# Patient Record
Sex: FEMALE | Race: WHITE
[De-identification: names, ages, dates, MRNs, and addresses within clinical notes are randomized per-mention and may not be internally consistent; named-entity substitution may affect disease eponyms.]

---

## 2019-11-28 ENCOUNTER — HOSPITAL ENCOUNTER (EMERGENCY)
Dept: HOSPITAL 97 - ER | Age: 69
Discharge: ANOTHER HOSPITAL | End: 2019-11-28
Payer: COMMERCIAL

## 2019-11-28 DIAGNOSIS — R47.01: ICD-10-CM

## 2019-11-28 DIAGNOSIS — Z79.82: ICD-10-CM

## 2019-11-28 DIAGNOSIS — R29.702: ICD-10-CM

## 2019-11-28 DIAGNOSIS — E11.9: ICD-10-CM

## 2019-11-28 DIAGNOSIS — I10: ICD-10-CM

## 2019-11-28 DIAGNOSIS — Z88.5: ICD-10-CM

## 2019-11-28 DIAGNOSIS — I63.9: Primary | ICD-10-CM

## 2019-11-28 LAB
ALBUMIN SERPL BCP-MCNC: 3.1 G/DL (ref 3.4–5)
ALP SERPL-CCNC: 118 U/L (ref 45–117)
ALT SERPL W P-5'-P-CCNC: 23 U/L (ref 12–78)
AST SERPL W P-5'-P-CCNC: 14 U/L (ref 15–37)
BUN BLD-MCNC: 11 MG/DL (ref 7–18)
GLUCOSE SERPLBLD-MCNC: 356 MG/DL (ref 74–106)
HCT VFR BLD CALC: 46.9 % (ref 36–45)
INR BLD: 1.05
LYMPHOCYTES # SPEC AUTO: 1.7 K/UL (ref 0.7–4.9)
MAGNESIUM SERPL-MCNC: 2.1 MG/DL (ref 1.8–2.4)
NT-PROBNP SERPL-MCNC: 701 PG/ML (ref ?–125)
PMV BLD: 11.4 FL (ref 7.6–11.3)
POTASSIUM SERPL-SCNC: 4 MMOL/L (ref 3.5–5.1)
RBC # BLD: 5.12 M/UL (ref 3.86–4.86)
TROPONIN (EMERG DEPT USE ONLY): < 0.02 NG/ML (ref 0–0.04)

## 2019-11-28 PROCEDURE — 84484 ASSAY OF TROPONIN QUANT: CPT

## 2019-11-28 PROCEDURE — 85730 THROMBOPLASTIN TIME PARTIAL: CPT

## 2019-11-28 PROCEDURE — 71045 X-RAY EXAM CHEST 1 VIEW: CPT

## 2019-11-28 PROCEDURE — 96372 THER/PROPH/DIAG INJ SC/IM: CPT

## 2019-11-28 PROCEDURE — 70496 CT ANGIOGRAPHY HEAD: CPT

## 2019-11-28 PROCEDURE — 70450 CT HEAD/BRAIN W/O DYE: CPT

## 2019-11-28 PROCEDURE — 83735 ASSAY OF MAGNESIUM: CPT

## 2019-11-28 PROCEDURE — 85610 PROTHROMBIN TIME: CPT

## 2019-11-28 PROCEDURE — 96366 THER/PROPH/DIAG IV INF ADDON: CPT

## 2019-11-28 PROCEDURE — 80076 HEPATIC FUNCTION PANEL: CPT

## 2019-11-28 PROCEDURE — 83880 ASSAY OF NATRIURETIC PEPTIDE: CPT

## 2019-11-28 PROCEDURE — 36415 COLL VENOUS BLD VENIPUNCTURE: CPT

## 2019-11-28 PROCEDURE — 96365 THER/PROPH/DIAG IV INF INIT: CPT

## 2019-11-28 PROCEDURE — 93005 ELECTROCARDIOGRAM TRACING: CPT

## 2019-11-28 PROCEDURE — 99285 EMERGENCY DEPT VISIT HI MDM: CPT

## 2019-11-28 PROCEDURE — 85025 COMPLETE CBC W/AUTO DIFF WBC: CPT

## 2019-11-28 PROCEDURE — 82947 ASSAY GLUCOSE BLOOD QUANT: CPT

## 2019-11-28 PROCEDURE — 96375 TX/PRO/DX INJ NEW DRUG ADDON: CPT

## 2019-11-28 PROCEDURE — 80048 BASIC METABOLIC PNL TOTAL CA: CPT

## 2019-11-28 NOTE — EDPHYS
Physician Documentation                                                                           

 [facility]                                                                 

Name: Sue Desai                                                                             

Age: 69 yrs                                                                                       

Sex: Female                                                                                       

: 1950                                                                                   

MRN: [id number]                                                                                   

Arrival Date: 2019                                                                          

Time: 18:40                                                                                       

Account#: [id number]                                                                            

Bed 24                                                                                            

Private MD:                                                                                       

ED Physician Pepe Guzmán                                                                      

HPI:                                                                                              

                                                                                             

18:55 This 69 yrs old  Female presents to ER via Ambulatory with complaints of       laquita 

      Trouble Talking.                                                                            

18:55 The patient presents to the emergency department with a speech or higher order brain    laquita 

      function problem, aphasia. Onset: The symptoms/episode began/occurred 4 day(s) ago.         

      Context: occurred at an unknown location. Associated signs and symptoms: The patient        

      has no apparent associated signs or symptoms. Severity of symptoms: At their worst the      

      symptoms were moderate in the emergency department the symptoms are unchanged.              

      Patient's baseline: Neuro: alert and fully oriented. The patient has not experienced        

      similar symptoms in the past.                                                               

                                                                                                  

Historical:                                                                                       

- Allergies:                                                                                      

18:45 Codeine;                                                                                tw2 

- Home Meds:                                                                                      

18:45 [place] Thyroid 60 mg Oral tab daily [Active];                                           tw2 

18:58 furosemide 40 mg Oral tab 1 tab 2 times per day [Active]; clonidine HCl 0.2 mg Oral tab tw2 

      1 tab 3 times per day [Active]; Prevacid 15 mg Oral cpDR 1 cap once daily [Active];         

      aspirin 81 mg Oral chew 1 tab once daily [Active]; glimepiride 4 mg Oral tab 1 tab once     

      daily [Active]; gabapentin 300 mg oral cap 1 cap 3 times per day [Active]; lisinopril       

      40 mg Oral tab 1 tab once daily [Active]; atenolol 100 mg Oral tab 1 tab once daily         

      [Active]; potassium chloride 10 mEq Oral cpER 1 cap once daily [Active]; diltiazem HCl      

      120 mg Oral CDER 1 cap once daily [Active];                                                 

- PMHx:                                                                                           

18:45 Back pain; Diabetes - NIDDM; Hypertension;                                              tw2 

                                                                                                  

- Immunization history:: Adult Immunizations.                                                     

- Social history:: Smoking status: .                                                              

- Ebola Screening: : Patient denies travel to an Ebola-affected area in the 21 days               

  before illness onset.                                                                           

- Family history:: not pertinent.                                                                 

                                                                                                  

                                                                                                  

ROS:                                                                                              

18:55 Constitutional: Negative for fever, chills, and weight loss, Eyes: Negative for injury, laquita 

      pain, redness, and discharge, ENT: Negative for injury, pain, and discharge, Neck:          

      Negative for injury, pain, and swelling, Cardiovascular: Negative for chest pain,           

      palpitations, and edema, Respiratory: Negative for shortness of breath, cough,              

      wheezing, and pleuritic chest pain, Abdomen/GI: Negative for abdominal pain, nausea,        

      vomiting, diarrhea, and constipation, Back: Negative for injury and pain, : Negative      

      for injury, bleeding, discharge, and swelling, MS/Extremity: Negative for injury and        

      deformity, Skin: Negative for injury, rash, and discoloration, Psych: Negative for          

      depression, anxiety, suicide ideation, homicidal ideation, and hallucinations,              

      Allergy/Immunology: Negative for hives, rash, and allergies, Endocrine: Negative for        

      neck swelling, polydipsia, polyuria, polyphagia, and marked weight changes,                 

      Hematologic/Lymphatic: Negative for swollen nodes, abnormal bleeding, and unusual           

      bruising.                                                                                   

18:55 Neuro: Positive for speech changes.                                                         

                                                                                                  

Exam:                                                                                             

18:55 Constitutional:  This is a well developed, well nourished patient who is awake, alert,  laquita 

      and in no acute distress. Head/Face:  Normocephalic, atraumatic. Eyes:  Pupils equal        

      round and reactive to light, extra-ocular motions intact.  Lids and lashes normal.          

      Conjunctiva and sclera are non-icteric and not injected.  Cornea within normal limits.      

      Periorbital areas with no swelling, redness, or edema. ENT:  Nares patent. No nasal         

      discharge, no septal abnormalities noted.  Tympanic membranes are normal and external       

      auditory canals are clear.  Oropharynx with no redness, swelling, or masses, exudates,      

      or evidence of obstruction, uvula midline.  Mucous membranes moist. Neck:  Trachea          

      midline, no thyromegaly or masses palpated, and no cervical lymphadenopathy.  Supple,       

      full range of motion without nuchal rigidity, or vertebral point tenderness.  No            

      Meningismus. Chest/axilla:  Normal chest wall appearance and motion.  Nontender with no     

      deformity.  No lesions are appreciated. Cardiovascular:  Regular rate and rhythm with a     

      normal S1 and S2.  No gallops, murmurs, or rubs.  Normal PMI, no JVD.  No pulse             

      deficits. Respiratory:  Lungs have equal breath sounds bilaterally, clear to                

      auscultation and percussion.  No rales, rhonchi or wheezes noted.  No increased work of     

      breathing, no retractions or nasal flaring. Abdomen/GI:  Soft, non-tender, with normal      

      bowel sounds.  No distension or tympany.  No guarding or rebound.  No evidence of           

      tenderness throughout. Back:  No spinal tenderness.  No costovertebral tenderness.          

      Full range of motion. Female :  Normal external genitalia. Skin:  Warm, dry with          

      normal turgor.  Normal color with no rashes, no lesions, and no evidence of cellulitis.     

      MS/ Extremity:  Pulses equal, no cyanosis.  Neurovascular intact.  Full, normal range       

      of motion. Psych:  Awake, alert, with orientation to person, place and time.  Behavior,     

      mood, and affect are within normal limits.                                                  

18:55 Neuro: Orientation: is normal, appropriate for stated age, no acute changes, Mentation:     

      is normal, appropriate for stated age, no acute changes, Memory: is normal, appropriate     

      for stated age, no acute changes, Cranial nerves: grossly normal, is grossly normal         

      based on the patient's age, no acute changes, Cerebellar function: is grossly normal,       

      is grossly normal based on the patient's age, Motor: is normal, is grossly normal based     

      on the patient's age, no acute changes, moves all fours, Sensation: is normal, Gait:        

      not applicable seizure activity, is not displayed by the patient.                           

                                                                                                  

Vital Signs:                                                                                      

18:43  / 83; Pulse 68; Resp 17; Temp 98.1(O); Pulse Ox 100% on R/A; Weight 77.11 kg;    tw2 

      Pain 0/10;                                                                                  

19:30  / 81; Pulse 68; Resp 18; Pulse Ox 100% on R/A;                                   mg2 

20:47  / 61; Pulse 67; Resp 18; Pulse Ox 100% on R/A;                                   mg2 

21:46  / 65; Pulse 63; Resp 18; Temp 98(O); Pulse Ox 99% on R/A; Pain 0/10;             mg2 

                                                                                                  

NIH Stroke Scale Scores:                                                                          

19:07 NIHSS Score: 2                                                                          laquita 

19:15 NIHSS Score: 3                                                                          mg2 

19:43 NIHSS Score: 3                                                                          laquita 

                                                                                                  

MDM:                                                                                              

18:53 Patient medically screened.                                                             laquita 

18:58 Data reviewed: vital signs, nurses notes, lab test result(s), EKG, radiologic studies,  laquita 

      CT scan, strok symptoms begn , worse today.                                           

19:44 ED course: neuro md at [facility], no tpa,window greater than 2 days.                          [facility] 

                                                                                                  

                                                                                             

18:52 Order name: Basic Metabolic Panel                                                       [facility] 

                                                                                             

18:52 Order name: CBC with Diff                                                               [facility] 

                                                                                             

18:52 Order name: LFT's                                                                       [facility] 

                                                                                             

18:52 Order name: Magnesium                                                                   [facility] 

                                                                                             

18:52 Order name: NT PRO-BNP                                                                  [facility] 

                                                                                             

18:52 Order name: PT-INR                                                                      [facility] 

                                                                                             

18:52 Order name: Troponin (emerg Dept Use Only)                                              [facility] 

                                                                                             

18:52 Order name: Ptt, Activated                                                              [facility] 

                                                                                             

19:12 Order name: CBC with Automated Diff; Complete Time: 19:16                               [facility]

                                                                                             

19:16 Order name: Glucose, Ancillary Testing; Complete Time: 19:16                            [facility]

                                                                                             

19:22 Order name: Protime (+INR); Complete Time: 19:23                                        [facility]

                                                                                             

19:22 Order name: PTT, Activated Partial Thromb; Complete Time: 19:23                         [facility]

                                                                                             

19:30 Order name: Basic Metabolic Panel; Complete Time: 19:34                                 [facility]

                                                                                             

19:30 Order name: Liver (Hepatic) Function; Complete Time: 19:34                              [facility]

                                                                                             

18:52 Order name: XRAY Chest (1 view)                                                         [facility] 

                                                                                             

18:52 Order name: CT Stroke Brain w/o Contrast                                                [facility] 

                                                                                             

19:19 Order name: CT; Complete Time: 19:21                                                    [facility]

                                                                                             

19:30 Order name: Troponin (Emerg Dept Use Only); Complete Time: 19:34                        [facility]

                                                                                             

19:30 Order name: NT PRO-BNP; Complete Time: 19:34                                            [facility]

                                                                                             

19:30 Order name: Magnesium; Complete Time: 19:34                                             [facility]

                                                                                             

19:36 Order name: CT Head Angio                                                               [facility] 

                                                                                             

19:43 Order name: RAD; Complete Time: 19:57                                                   [facility]

                                                                                             

20:06 Order name: Urine Culture                                                               [facility] 

                                                                                             

20:42 Order name: CT                                                                          [facility]

                                                                                             

21:01 Order name: Glucose, Ancillary Testing                                                  [facility]

                                                                                             

18:52 Order name: EKG; Complete Time: 18:54                                                   [facility] 

                                                                                             

18:52 Order name: Cardiac monitoring; Complete Time: 19:05                                    [facility] 

                                                                                             

18:52 Order name: EKG - Nurse/Tech; Complete Time: 19:04                                      [facility] 

                                                                                             

18:52 Order name: IV Saline Lock; Complete Time: 19:05                                        [facility] 

                                                                                             

18:52 Order name: Labs collected and sent; Complete Time: 19:05                               [facility] 

                                                                                             

18:52 Order name: O2 Per Protocol; Complete Time: 19:05                                       [facility] 

                                                                                             

18:52 Order name: O2 Sat Monitoring; Complete Time: 19:05                                     [facility] 

                                                                                             

18:52 Order name: Accucheck; Complete Time: 19:04                                             [facility] 

                                                                                             

18:52 Order name: NPO; Complete Time: 19:04                                                   [facility] 

                                                                                             

18:52 Order name: Stroke Swallow Screen; Complete Time: 19:15                                 [facility] 

                                                                                                  

Administered Medications:                                                                         

19:14 Drug: NS 0.9% 1000 ml Route: IV; Rate: 1 bolus; Site: right antecubital;                tr5 

21:11 Follow up: Response: No adverse reaction; IV Status: Completed infusion; IV Intake:     mg2 

      1000ml                                                                                      

19:15 Drug: foLIC Acid 1 mg Route: IVPB; Site: right antecubital;                             tr5 

21:12 Follow up: Response: No adverse reaction; IV Status: Completed infusion                 mg2 

19:27 Drug: Aspirin Chewable Tablet 162 mg Route: PO;                                         tr5 

21:11 Follow up: Response: No adverse reaction                                                mg2 

19:27 Drug: PlaVIX 75 mg Route: PO;                                                           tr5 

21:11 Follow up: Response: No adverse reaction                                                mg2 

19:39 Drug: Insulin Regular Human 6 units {Co-Signature: mg2 (Juan Meraz RN).} Route:    tr5 

      Sub-Q; Site: right upper arm;                                                               

21:09 Follow up: Response: No adverse reaction; Blood sugar is lowered                        mg2 

19:40 Drug: Insulin Regular Human 5 units {Co-Signature: mg2 (Juan Meraz RN).} Route:    tr5 

      IVP; Site: right antecubital;                                                               

21:10 Follow up: Response: No adverse reaction; Blood sugar is lowered                        mg2 

21:10 Drug: NS 0.9% 1000 ml Route: IV; Rate: 125 ml/hr; Site: right forearm;                  mg2 

21:45 Drug: Rocephin 1 grams Route: IV; Rate: per protocol; Site: right forearm;              mg2 

                                                                                                  

                                                                                                  

Disposition:                                                                                      

19 19:01 Transfer ordered to [facility]. Diagnosis are Cerebral         

  infarction, Aphasia following cerebral infarction, Type 2 diabetes mellitus,                    

  Essential (primary) hypertension.                                                               

- Reason for transfer: Higher level of care.                                                      

- Accepting physician is to [facility], stroke team.                                                     

- Condition is Stable.                                                                            

- Problem is new.                                                                                 

- Symptoms have improved.                                                                         

                                                                                                  

                                                                                                  

                                                                                                  

                                                                                                  

NIH Stroke Scale - NIH Stroke Score                                                               

Date: 2019                                                                                  

Time: 19:07                                                                                       

Total Score = 2                                                                                   

  1a. Level of Consciousness (LOC) - 0(Alert)                                                     

  1b. Level of Consciousness (LOC) (Year \T\ Age) - 0(Both)                                       

  1c. LOC Commands (Open \T\ Closes Eyes/) - 0(Both)                                          

   2. Best Gaze (Lateral Gaze Paresis) - 0(Normal)                                                

   3. Visual Field Loss - 0(No visual loss)                                                       

   4. Facial Palsy - 0(Normal)                                                                    

  5a. Left Arm: Motor (10-second hold) - 0(No drift)                                              

  5b. Right Arm: Motor (10-second hold) - 0(No drift)                                             

  6a. Left Leg: Motor (5-second hold - always test supine) - 0(No drift)                          

  6b. Right Leg: Motor (5-second hold - always test supine) - 0(No drift)                         

   7. Limb Ataxia (finger/nose \T\ heel/shin - test with eyes open) - 0(Absent)                   

   8. Sensory Loss (pinprick arms/legs/face) - 0(Normal)                                          

   9. Best Language: Aphasia (description/naming/reading) - 1(Mild to moderate                    

      aphasia)                                                                                    

  10. Dysarthria (speech clarity - read or repeat words) - 1(Mild to Moderate)                    

  11. Extinction and Inattention (visual/tactile/auditory/spatial/personal) - 0(No                

      abnormality)                                                                                

Initials: [facility]                                                                                     

                                                                                                  

                                                                                                  

NIH Stroke Scale - NIH Stroke Score                                                               

Date: 2019                                                                                  

Time: 19:15                                                                                       

Total Score = 3                                                                                   

  1a. Level of Consciousness (LOC) - 0(Alert)                                                     

  1b. Level of Consciousness (LOC) (Year \T\ Age) - 0(Both)                                       

  1c. LOC Commands (Open \T\ Closes Eyes/) - 0(Both)                                          

   2. Best Gaze (Lateral Gaze Paresis) - 0(Normal)                                                

   3. Visual Field Loss - 0(No visual loss)                                                       

   4. Facial Palsy - 1(Minor Paralysis)                                                           

  5a. Left Arm: Motor (10-second hold) - 0(No drift)                                              

  5b. Right Arm: Motor (10-second hold) - 0(No drift)                                             

  6a. Left Leg: Motor (5-second hold - always test supine) - 0(No drift)                          

  6b. Right Leg: Motor (5-second hold - always test supine) - 0(No drift)                         

   7. Limb Ataxia (finger/nose \T\ heel/shin - test with eyes open) - 0(Absent)                   

   8. Sensory Loss (pinprick arms/legs/face) - 0(Normal)                                          

   9. Best Language: Aphasia (description/naming/reading) - 1(Mild to moderate                    

      aphasia)                                                                                    

  10. Dysarthria (speech clarity - read or repeat words) - 1(Mild to Moderate)                    

  11. Extinction and Inattention (visual/tactile/auditory/spatial/personal) - 0(No                

      abnormality)                                                                                

Initials: mg2                                                                                     

                                                                                                  

                                                                                                  

NIH Stroke Scale - NIH Stroke Score                                                               

Date: 2019                                                                                  

Time: 19:43                                                                                       

Total Score = 3                                                                                   

  1a. Level of Consciousness (LOC) - 0(Alert)                                                     

  1b. Level of Consciousness (LOC) (Year \T\ Age) - 0(Both)                                       

  1c. LOC Commands (Open \T\ Closes Eyes/) - 0(Both)                                          

   2. Best Gaze (Lateral Gaze Paresis) - 0(Normal)                                                

   3. Visual Field Loss - 0(No visual loss)                                                       

   4. Facial Palsy - 1(Minor Paralysis)                                                           

  5a. Left Arm: Motor (10-second hold) - 0(No drift)                                              

  5b. Right Arm: Motor (10-second hold) - 0(No drift)                                             

  6a. Left Leg: Motor (5-second hold - always test supine) - 0(No drift)                          

  6b. Right Leg: Motor (5-second hold - always test supine) - 0(No drift)                         

   7. Limb Ataxia (finger/nose \T\ heel/shin - test with eyes open) - 0(Absent)                   

   8. Sensory Loss (pinprick arms/legs/face) - 0(Normal)                                          

   9. Best Language: Aphasia (description/naming/reading) - 1(Mild to moderate                    

      aphasia)                                                                                    

  10. Dysarthria (speech clarity - read or repeat words) - 1(Mild to Moderate)                    

  11. Extinction and Inattention (visual/tactile/auditory/spatial/personal) - 0(No                

      abnormality)                                                                                

Initials: [facility]                                                                                     

                                                                                                  

Signatures:                                                                                       

Dispatcher [employer]                           EDPepe Nobles MD MD cha Wise, Tara RN                          RN   tw2                                                  

Juan Meraz RN RN   mg2                                                  

Ilir Gonzalez RN                   RN   tr5                                                  

Juan Meraz RN                           mg2                                                  

                                                                                                  

Corrections: (The following items were deleted from the chart)                                    

19:18 19:01 2019 19:01 Transfer ordered to [facility].      laquita         

      Diagnosis is Cerebral infarction; Aphasia following cerebral infarction. Reason             

      for transfer: Higher level of care. Accepting physician is to [facility], stroke team.             

      Condition is Stable. Problem is new. Symptoms have improved. laquita                            

19:18 19:18 2019 19:01 Transfer ordered to [facility].      laquita         

      Diagnosis is Cerebral infarction; Aphasia following cerebral infarction; Type 2             

      diabetes mellitus. Reason for transfer: Higher level of care. Accepting                     

      physician is to [facility], stroke team. Condition is Stable. Problem is new. Symptoms             

      have improved. laquita                                                                          

22:14 19:18 2019 19:01 Transfer ordered to [facility].      tr5         

      Diagnosis is Cerebral infarction; Aphasia following cerebral infarction; Type 2             

      diabetes mellitus; Essential (primary) hypertension. Reason for transfer:                   

      Higher level of care. Accepting physician is to [facility], stroke team. Condition is              

      Stable. Problem is new. Symptoms have improved. laquita                                         

                                                                                                  

**************************************************************************************************

## 2019-11-28 NOTE — ER
Nurse's Notes                                                                                     

 [facility]                                                                 

Name: Sue Desai                                                                             

Age: 69 yrs                                                                                       

Sex: Female                                                                                       

: 1950                                                                                   

MRN: [id number]                                                                                   

Arrival Date: 2019                                                                          

Time: 18:40                                                                                       

Account#: [id number]                                                                            

Bed 24                                                                                            

Private MD:                                                                                       

Diagnosis: Cerebral infarction;Aphasia following cerebral infarction;Type 2 diabetes              

  mellitus;Essential (primary) hypertension                                                       

                                                                                                  

Presentation:                                                                                     

                                                                                             

18:41 Presenting complaint: Patient states: i cant remember Child states: on Tuesday evening  tw2 

      when she got off work at 10pm she seemed off, today she called me about 1pm and she was     

      saying that she wasn't going to work, my daughter said she was down yesterday not           

      perky, and then today at 5pm i went by there and she cant get her thought out, left         

      cerebellar stroke 3 years ago. Transition of care: patient was not received from            

      another setting of care. Onset of symptoms was 2019. Risk Assessment: Do       

      you want to hurt yourself or someone else? Patient reports no desire to harm self or        

      others. Initial Sepsis Screen: Does the patient meet any 2 criteria? No. Patient's          

      initial sepsis screen is negative. Does the patient have a suspected source of              

      infection? No. Patient's initial sepsis screen is negative. Care prior to arrival: None.    

18:41 Method Of Arrival: Ambulatory                                                           tw2 

18:41 Acuity: GERI 2                                                                           tw2 

18:53 Presenting complaint: Child states: to me her face looks different like her right side  tw2 

      is swollen, i am going to call my daughter and see if she can give me more specifics on     

      how she was today and when the difficult getting her words out started. and again her       

      stroke 3 years ago did not present as your typical stroke.                                  

                                                                                                  

Triage Assessment:                                                                                

18:44 General: Appears in no apparent distress. well groomed, Behavior is cooperative. Pain:  tw2 

      Denies pain. Neuro: Reports "trouble getting my words out".                                 

                                                                                                  

Historical:                                                                                       

- Allergies:                                                                                      

18:45 Codeine;                                                                                tw2 

- Home Meds:                                                                                      

18:45 Persia Thyroid 60 mg Oral tab daily [Active];                                           tw2 

18:58 furosemide 40 mg Oral tab 1 tab 2 times per day [Active]; clonidine HCl 0.2 mg Oral tab tw2 

      1 tab 3 times per day [Active]; Prevacid 15 mg Oral cpDR 1 cap once daily [Active];         

      aspirin 81 mg Oral chew 1 tab once daily [Active]; glimepiride 4 mg Oral tab 1 tab once     

      daily [Active]; gabapentin 300 mg oral cap 1 cap 3 times per day [Active]; lisinopril       

      40 mg Oral tab 1 tab once daily [Active]; atenolol 100 mg Oral tab 1 tab once daily         

      [Active]; potassium chloride 10 mEq Oral cpER 1 cap once daily [Active]; diltiazem HCl      

      120 mg Oral CDER 1 cap once daily [Active];                                                 

- PMHx:                                                                                           

18:45 Back pain; Diabetes - NIDDM; Hypertension;                                              tw2 

                                                                                                  

- Immunization history:: Adult Immunizations.                                                     

- Social history:: Smoking status: .                                                              

- Ebola Screening: : Patient denies travel to an Ebola-affected area in the 21 days               

  before illness onset.                                                                           

- Family history:: not pertinent.                                                                 

                                                                                                  

                                                                                                  

Screenin:55 Abuse screen: Denies threats or abuse. Nutritional screening: No deficits noted.        tw2 

      Tuberculosis screening: No symptoms or risk factors identified. Fall Risk Secondary         

      diagnosis (15 points) impaired mobility.                                                    

19:25 Patient has been NPO before screening. The patient is alert, able to follow commands.   mg2 

      The patient exhibits slurred or garbled speech. The patient is exhibiting difficulty        

      speaking. The patient does not exhibit difficulty understanding words. The patient is       

      able to swallow own secretions with no drooling or need for suction. Patient tolerated      

      one teaspoon of water. No drooling, immediate coughing, gurgling, or clearing of the        

      throat was noted. The patient tolerated 90mL of water. No drooling, immediate coughing,     

      gurgling, or clearing of the throat was noted. The patient passed the bedside swallow       

      screening. Oral medications may be given as ordered. Contact Physician for further diet     

      orders. Provider notified of bedside swallow screening results: Pepe Guzmán MD.          

                                                                                                  

Assessment:                                                                                       

18:49 Reassessment: pt transported to CT via stretcher with JENIFFER Phillips at this time.         tw2 

19:42 General: Appears in no apparent distress. comfortable, Behavior is calm, cooperative.   mg2 

      Pain: Denies pain. Neuro: Level of Consciousness is awake, alert, obeys commands,           

      Oriented to person, place, time, situation. Neuro:  are equal bilaterally Speech       

      is slurred, with expressive aphasia noted, Facial droop on right, Intact.                   

      Cardiovascular: Capillary refill < 3 seconds Patient's skin is warm and dry.                

      Respiratory: Airway is patent Respiratory effort is even, unlabored, Respiratory            

      pattern is regular, symmetrical. GI: Reports diarrhea. : No signs and/or symptoms         

      were reported regarding the genitourinary system. EENT: No signs and/or symptoms were       

      reported regarding the EENT system. Derm: Skin is intact, is healthy with good turgor,      

      Skin is pink, warm \T\ dry. normal. Musculoskeletal: Circulation, motion, and sensation     

      intact. Capillary refill < 3 seconds.                                                       

19:52 Reassessment: patient sent to ct scan for ct angio via stretcher.                       mg2 

20:15 Reassessment: Patient appears in no apparent distress at this time. Patient and/or      mg2 

      family updated on plan of care and expected duration. Pain level reassessed. Patient is     

      alert, oriented x 3, equal unlabored respirations, skin warm/dry/pink.                      

21:00 Reassessment: Patient appears in no apparent distress at this time. Patient and/or      mg2 

      family updated on plan of care and expected duration. Pain level reassessed. Patient is     

      alert, oriented x 3, equal unlabored respirations, skin warm/dry/pink. report given to      

      JENIFFER Garcia of [facility] [id number]. transfer form signed by the daughter and informed      

      about the waiting time of at least 3 hours for the ambulance to come and pick her up.       

      Patient denies pain at this time.                                                           

21:52 Reassessment: Patient appears in no apparent distress at this time. Patient and/or      mg2 

      family updated on plan of care and expected duration. Pain level reassessed. Patient is     

      alert, oriented x 3, equal unlabored respirations, skin warm/dry/pink.                      

                                                                                                  

Vital Signs:                                                                                      

18:43  / 83; Pulse 68; Resp 17; Temp 98.1(O); Pulse Ox 100% on R/A; Weight 77.11 kg;    tw2 

      Pain 0/10;                                                                                  

19:30  / 81; Pulse 68; Resp 18; Pulse Ox 100% on R/A;                                   mg2 

20:47  / 61; Pulse 67; Resp 18; Pulse Ox 100% on R/A;                                   mg2 

21:46  / 65; Pulse 63; Resp 18; Temp 98(O); Pulse Ox 99% on R/A; Pain 0/10;             mg2 

                                                                                                  

NIH Stroke Scale Scores:                                                                          

19:07 NIHSS Score: 2                                                                          laquita 

19:15 NIHSS Score: 3                                                                          mg2 

19:43 NIHSS Score: 3                                                                          laquita 

                                                                                                  

ED Course:                                                                                        

18:40 Patient arrived in ED.                                                                  mr  

18:43 Triage completed.                                                                       tw2 

18:44 Arm band placed on.                                                                     tw2 

18:45 Placed in gown. Bed in low position. Call light in reach. Adult w/ patient.             tw2 

18:51 Pepe Guzmán MD is Attending Physician.                                             laquita 

18:57 CT Stroke Brain w/o Contrast Sent.                                                      tr5 

19:00 Inserted saline lock: 20 gauge in right forearm, using aseptic technique. Blood         mg2 

      collected.                                                                                  

19:03 EKG done, by ED staff, reviewed by Pepe Guzmán MD. Patient maintains SpO2 saturation jp3 

      greater than 95% on room air.                                                               

19:05 Juan Meraz, RN is Primary Nurse.                                                  mg2 

19:05 XRAY Chest (1 view) Sent.                                                               tr5 

19:12 Side rails up X 1. Side rails up X2. Verbal reassurance given. Cardiac monitor on.      jp3 

      Pulse ox on. NIBP on.                                                                       

19:43 No provider procedures requiring assistance completed.                                  mg2 

20:08 CT completed. Patient tolerated procedure well. Patient moved to CT via stretcher.      eh  

      Patient moved back from CT.                                                                 

21:50 Report given to JENIFFER Hazel.                                                             mg2 

21:50 Patient transferred, IV remains in place.                                               mg2 

                                                                                                  

Administered Medications:                                                                         

19:14 Drug: NS 0.9% 1000 ml Route: IV; Rate: 1 bolus; Site: right antecubital;                tr5 

21:11 Follow up: Response: No adverse reaction; IV Status: Completed infusion; IV Intake:     mg2 

      1000ml                                                                                      

19:15 Drug: foLIC Acid 1 mg Route: IVPB; Site: right antecubital;                             tr5 

21:12 Follow up: Response: No adverse reaction; IV Status: Completed infusion                 mg2 

19:27 Drug: Aspirin Chewable Tablet 162 mg Route: PO;                                         tr5 

21:11 Follow up: Response: No adverse reaction                                                mg2 

19:27 Drug: PlaVIX 75 mg Route: PO;                                                           tr5 

21:11 Follow up: Response: No adverse reaction                                                mg2 

19:39 Drug: Insulin Regular Human 6 units {Co-Signature: mg2 (Juan Meraz RN).} Route:    tr5 

      Sub-Q; Site: right upper arm;                                                               

21:09 Follow up: Response: No adverse reaction; Blood sugar is lowered                        mg2 

19:40 Drug: Insulin Regular Human 5 units {Co-Signature: mg2 (Juan Meraz RN).} Route:    tr5 

      IVP; Site: right antecubital;                                                               

21:10 Follow up: Response: No adverse reaction; Blood sugar is lowered                        mg2 

21:10 Drug: NS 0.9% 1000 ml Route: IV; Rate: 125 ml/hr; Site: right forearm;                  mg2 

21:45 Drug: Rocephin 1 grams Route: IV; Rate: per protocol; Site: right forearm;              mg2 

                                                                                                  

                                                                                                  

Intake:                                                                                           

21:11 IV: 1000ml; Total: 1000ml.                                                              mg2 

                                                                                                  

Outcome:                                                                                          

19:01 ER care complete, transfer ordered by MD.                                               [facility] 

22:12 Transferred by [facility] to [facility], Transfer form completed.    tr5 

22:12 Condition: stable                                                                           

22:12 Instructed on the need for transfer.                                                        

22:14 Patient left the ED.                                                                    tr5 

                                                                                                  

                                                                                                  

NIH Stroke Scale - NIH Stroke Score                                                               

Date: 2019                                                                                  

Time: 19:07                                                                                       

Total Score = 2                                                                                   

  1a. Level of Consciousness (LOC) - 0(Alert)                                                     

  1b. Level of Consciousness (LOC) (Year \T\ Age) - 0(Both)                                       

  1c. LOC Commands (Open \T\ Closes Eyes/) - 0(Both)                                          

   2. Best Gaze (Lateral Gaze Paresis) - 0(Normal)                                                

   3. Visual Field Loss - 0(No visual loss)                                                       

   4. Facial Palsy - 0(Normal)                                                                    

  5a. Left Arm: Motor (10-second hold) - 0(No drift)                                              

  5b. Right Arm: Motor (10-second hold) - 0(No drift)                                             

  6a. Left Leg: Motor (5-second hold - always test supine) - 0(No drift)                          

  6b. Right Leg: Motor (5-second hold - always test supine) - 0(No drift)                         

   7. Limb Ataxia (finger/nose \T\ heel/shin - test with eyes open) - 0(Absent)                   

   8. Sensory Loss (pinprick arms/legs/face) - 0(Normal)                                          

   9. Best Language: Aphasia (description/naming/reading) - 1(Mild to moderate                    

      aphasia)                                                                                    

  10. Dysarthria (speech clarity - read or repeat words) - 1(Mild to Moderate)                    

  11. Extinction and Inattention (visual/tactile/auditory/spatial/personal) - 0(No                

      abnormality)                                                                                

Initials: [facility]                                                                                     

                                                                                                  

                                                                                                  

NIH Stroke Scale - NIH Stroke Score                                                               

Date: 2019                                                                                  

Time: 19:15                                                                                       

Total Score = 3                                                                                   

  1a. Level of Consciousness (LOC) - 0(Alert)                                                     

  1b. Level of Consciousness (LOC) (Year \T\ Age) - 0(Both)                                       

  1c. LOC Commands (Open \T\ Closes Eyes/) - 0(Both)                                          

   2. Best Gaze (Lateral Gaze Paresis) - 0(Normal)                                                

   3. Visual Field Loss - 0(No visual loss)                                                       

   4. Facial Palsy - 1(Minor Paralysis)                                                           

  5a. Left Arm: Motor (10-second hold) - 0(No drift)                                              

  5b. Right Arm: Motor (10-second hold) - 0(No drift)                                             

  6a. Left Leg: Motor (5-second hold - always test supine) - 0(No drift)                          

  6b. Right Leg: Motor (5-second hold - always test supine) - 0(No drift)                         

   7. Limb Ataxia (finger/nose \T\ heel/shin - test with eyes open) - 0(Absent)                   

   8. Sensory Loss (pinprick arms/legs/face) - 0(Normal)                                          

   9. Best Language: Aphasia (description/naming/reading) - 1(Mild to moderate                    

      aphasia)                                                                                    

  10. Dysarthria (speech clarity - read or repeat words) - 1(Mild to Moderate)                    

  11. Extinction and Inattention (visual/tactile/auditory/spatial/personal) - 0(No                

      abnormality)                                                                                

Initials: [facility]                                                                                     

                                                                                                  

                                                                                                  

NIH Stroke Scale - NIH Stroke Score                                                               

Date: 2019                                                                                  

Time: 19:43                                                                                       

Total Score = 3                                                                                   

  1a. Level of Consciousness (LOC) - 0(Alert)                                                     

  1b. Level of Consciousness (LOC) (Year \T\ Age) - 0(Both)                                       

  1c. LOC Commands (Open \T\ Closes Eyes/) - 0(Both)                                          

   2. Best Gaze (Lateral Gaze Paresis) - 0(Normal)                                                

   3. Visual Field Loss - 0(No visual loss)                                                       

   4. Facial Palsy - 1(Minor Paralysis)                                                           

  5a. Left Arm: Motor (10-second hold) - 0(No drift)                                              

  5b. Right Arm: Motor (10-second hold) - 0(No drift)                                             

  6a. Left Leg: Motor (5-second hold - always test supine) - 0(No drift)                          

  6b. Right Leg: Motor (5-second hold - always test supine) - 0(No drift)                         

   7. Limb Ataxia (finger/nose \T\ heel/shin - test with eyes open) - 0(Absent)                   

   8. Sensory Loss (pinprick arms/legs/face) - 0(Normal)                                          

   9. Best Language: Aphasia (description/naming/reading) - 1(Mild to moderate                    

      aphasia)                                                                                    

  10. Dysarthria (speech clarity - read or repeat words) - 1(Mild to Moderate)                    

  11. Extinction and Inattention (visual/tactile/auditory/spatial/personal) - 0(No                

      abnormality)                                                                                

Initials: [facility]                                                                                     

                                                                                                  

Signatures:                                                                                       

Pepe Guzmán MD MD cha Rivera, Lanie                                 mr Upton, Alana Garcia RN                          RN   tw2                                                  

Juan Meraz RN                    RN   mg2                                                  

Mando Rey jp3                                                  

Ilir Gonzalez RN                   RN   tr5                                                  

Juan Meraz RN                           mg2                                                  

                                                                                                  

Corrections: (The following items were deleted from the chart)                                    

19:50 19:49 Patient has been NPO before screening. The patient is alert, able to      mg2         

      follow commands. The patient exhibits slurred or garbled speech. The patient is             

      exhibiting difficulty speaking. The patient does not exhibit difficulty                     

      understanding words. The patient is able to swallow own secretions with no                  

      drooling or need for suction. Patient tolerated one teaspoon of water. No                   

      drooling, immediate coughing, gurgling, or clearing of the throat was noted.                

      The patient tolerated 90mL of water. No drooling, immediate coughing, gurgling,             

      or clearing of the throat was noted. The patient passed the bedside swallow                 

      screening. Oral medications may be given as ordered. Contact Physician for                  

      further diet orders. Provider notified of bedside swallow screening results:                

      Pepe Guzmán MD mg2                                                                       

                                                                                                  

**************************************************************************************************

## 2019-11-28 NOTE — RAD REPORT
EXAM DESCRIPTION:  Alond[facility] Single View11/28/2019 7:16 pm

 

CLINICAL HISTORY:  cough

 

COMPARISON:  2016

 

FINDINGS:   The lungs appear clear of acute infiltrate. The heart is normal size

 

IMPRESSION:   No acute abnormalities displayed

## 2019-11-28 NOTE — RAD REPORT
EXAM DESCRIPTION:  CTHead angio11/28/2019 8:11 pm

 

CLINICAL HISTORY:  Slurred speech

 

COMPARISON:  None

 

TECHNIQUE:  CT angiogram of the head was obtained. 3D MIPS reconstruction performed.

 

All CT scans are performed using dose optimization technique as appropriate and may include automated
 exposure control or mA/KV adjustment according to patient size.

 

FINDINGS:  Moderate narrowing involves the P1 segment of the left posterior cerebral artery which is 
chronic

 

The distal aspect of the left vertebral artery is small.

 

Calcified plaque results in moderate narrowing of the cavernous portion of the right carotid artery. 
Proximal to this within the cavernous portion of the right internal carotid artery is a another area 
marked narrowing. Moderate narrowing involves the distal right internal carotid artery

 

The basilar, left internal carotid, right posterior cerebral, bilateral anterior cerebral and bilater
al middle cerebral arteries do not demonstrate a significant stenosis.

 

An aneurysm is not noted

 

IMPRESSION:  Marked narrowing involves the cavernous portion of the right internal carotid artery

## 2019-11-28 NOTE — RAD REPORT
EXAM DESCRIPTION:  CT - Ct Stroke Brain Wo Cont - 11/28/2019 6:58 pm

 

CLINICAL HISTORY:  Confusion/ dizziness

 

COMPARISON:  2016

 

TECHNIQUE:  Computed axial tomography of the head was obtained.

 

All CT scans are performed using dose optimization technique as appropriate and may include automated
 exposure control or mA/KV adjustment according to patient size.

 

FINDINGS:  An intracranial  bleed is not seen .

 

The ventricles are normal in caliber.

 

No extra-axial fluid collection is noted.

 

A 4.2 centimeter low-density area within the left cerebellum compatible with old infarction.

 

2.5 centimeter low-density areas present within the left frontal lobe/anterior left basal ganglia

 

Fluid within the sinuses/ mastoids is not seen.

 

IMPRESSION:  2.5 centimeter low-density area is present within the left frontal lobe/anterior left ba
sal ganglia compatible with infarction. It could be subacute or chronic.

 

Old left cerebellar infarct

 

Doctor Guzmán of the emergency room was notified at 658 p.m. November 28, 2019

## 2019-11-29 VITALS — TEMPERATURE: 98 F | OXYGEN SATURATION: 99 % | SYSTOLIC BLOOD PRESSURE: 197 MMHG | DIASTOLIC BLOOD PRESSURE: 65 MMHG

## 2019-11-29 NOTE — EKG
Test Date:    2019-11-28               Test Time:    19:01:55

Technician:   DL                                    

                                                     

MEASUREMENT RESULTS:                                       

Intervals:                                           

Rate:         64                                     

MN:           158                                    

QRSD:         136                                    

QT:           472                                    

QTc:          486                                    

Axis:                                                

P:            54                                     

MN:           158                                    

QRS:          40                                     

T:            50                                     

                                                     

INTERPRETIVE STATEMENTS:                                       

                                                     

Normal sinus rhythm

Right bundle branch block

Abnormal ECG

Compared to ECG 12/30/2016 20:14:27

No significant changes



Electronically Signed On 11-29-19 09:39:37 CST by Kyle Lopes

## 2020-06-09 ENCOUNTER — HOSPITAL ENCOUNTER (EMERGENCY)
Dept: HOSPITAL 97 - ER | Age: 70
Discharge: HOME | End: 2020-06-09
Payer: COMMERCIAL

## 2020-06-09 VITALS — OXYGEN SATURATION: 99 % | DIASTOLIC BLOOD PRESSURE: 105 MMHG | SYSTOLIC BLOOD PRESSURE: 169 MMHG

## 2020-06-09 VITALS — TEMPERATURE: 99.1 F

## 2020-06-09 DIAGNOSIS — E11.9: ICD-10-CM

## 2020-06-09 DIAGNOSIS — Z79.82: ICD-10-CM

## 2020-06-09 DIAGNOSIS — Z88.5: ICD-10-CM

## 2020-06-09 DIAGNOSIS — N39.0: Primary | ICD-10-CM

## 2020-06-09 DIAGNOSIS — Z86.73: ICD-10-CM

## 2020-06-09 DIAGNOSIS — I10: ICD-10-CM

## 2020-06-09 LAB
ALBUMIN SERPL BCP-MCNC: 3.2 G/DL (ref 3.4–5)
ALP SERPL-CCNC: 102 U/L (ref 45–117)
ALT SERPL W P-5'-P-CCNC: 21 U/L (ref 12–78)
AST SERPL W P-5'-P-CCNC: 12 U/L (ref 15–37)
BUN BLD-MCNC: 19 MG/DL (ref 7–18)
GLUCOSE SERPLBLD-MCNC: 151 MG/DL (ref 74–106)
HCT VFR BLD CALC: 45.1 % (ref 36–45)
LIPASE SERPL-CCNC: 124 U/L (ref 73–393)
LYMPHOCYTES # SPEC AUTO: 2.3 K/UL (ref 0.7–4.9)
PMV BLD: 11.4 FL (ref 7.6–11.3)
POTASSIUM SERPL-SCNC: 4.2 MMOL/L (ref 3.5–5.1)
RBC # BLD: 5 M/UL (ref 3.86–4.86)
UA COMPLETE W REFLEX CULTURE PNL UR: (no result)

## 2020-06-09 PROCEDURE — 81003 URINALYSIS AUTO W/O SCOPE: CPT

## 2020-06-09 PROCEDURE — 96372 THER/PROPH/DIAG INJ SC/IM: CPT

## 2020-06-09 PROCEDURE — 87086 URINE CULTURE/COLONY COUNT: CPT

## 2020-06-09 PROCEDURE — 87088 URINE BACTERIA CULTURE: CPT

## 2020-06-09 PROCEDURE — 83690 ASSAY OF LIPASE: CPT

## 2020-06-09 PROCEDURE — 74177 CT ABD & PELVIS W/CONTRAST: CPT

## 2020-06-09 PROCEDURE — 80048 BASIC METABOLIC PNL TOTAL CA: CPT

## 2020-06-09 PROCEDURE — 36415 COLL VENOUS BLD VENIPUNCTURE: CPT

## 2020-06-09 PROCEDURE — 85025 COMPLETE CBC W/AUTO DIFF WBC: CPT

## 2020-06-09 PROCEDURE — 81015 MICROSCOPIC EXAM OF URINE: CPT

## 2020-06-09 PROCEDURE — 99284 EMERGENCY DEPT VISIT MOD MDM: CPT

## 2020-06-09 PROCEDURE — 80076 HEPATIC FUNCTION PANEL: CPT

## 2020-06-09 NOTE — XMS REPORT
Clinical Summary

                             Created on:2020



Patient:Jose Oro

Sex:Female

:1950

External Reference #:[id number]





Demographics







                          Address                   [address] [place] [address]

 

                          Home Phone                [phone]

 

                          Mobile Phone              [phone]

 

                          Preferred Language        English

 

                          Marital Status            Unknown

 

                          Denominational Affiliation     Unknown

 

                          Race                      White

 

                          Ethnic Group              Not  or 









Author







                          Organization              [facility]

 

                          Address                   [address] [place] [address]









Support







                Name            Relationship    Address         Phone

 

                Cristal Mcintosh Unavailable     Unavailable     [phone]

[phone]









Care Team Providers







                    Name                Role                Phone

 

                    Kaiser Arguello      Primary Care Provider Unavailable









Allergies







             Active Allergy Reactions    Severity     Noted Date   Comments

 

             Codeine      Itching, Rash Medium       2016   Rash and itchi

ng







Medications







          Medication Sig       Dispensed Refills   Start Date End Date  Status

 

          thyroid, pork, 30 Take 1 tablet 30 tablet 2         2017        

   Active



          mg Tab    (30 mg total)                                         



                    by mouth Every                                         



                    morning on an                                         



                    empty stomach.                                         

 

          pantoprazole Take 1 tablet 30 tablet 0         2017           Ac

tive



          (PROTONIX) 40 MG (40 mg total)                                        

 



          tablet    by mouth                                          



                    daily.                                            

 

          cloNIDine HCl Take 0.2 mg by           0                             A

ctive



          (CATAPRES) 0.2 MG mouth 3                                           



          tablet    (three) times                                         



                    daily.                                            

 

          glimepiride Take 4 mg by           0                             Activ

e



          (AMARYL) 4 MG mouth 2 (two)                                         



          tablet    times daily.                                         

 

          lisinopril Take 40 mg by           0                             Activ

e



          (PRINIVIL,ZESTRIL) mouth 2 (two)                                      

   



          40 MG tablet times daily.                                         

 

          dilTIAZem Take 120 mg by           0                             Activ

e



          (CARDIZEM) 120 MG mouth 2 (two)                                       

  



          tablet    times daily.                                         

 

          furosemide (LASIX) Take 40 mg by           0                          

   Active



          40 MG tablet mouth 2 (two)                                         



                    times daily.                                         

 

          potassium chloride Take 10 mEq by           0                         

    Active



          (KLOR-CON) 10 MEQ mouth daily.                                        

 



          CR tablet                                                   

 

          atenolol  Take 100 mg by           0                             Activ

e



          (TENORMIN) 100 MG mouth 2 (two)                                       

  



          tablet    times daily.                                         

 

          lansoprazole Take 15 mg by           0                             Act

lucinda



          (PREVACID) 15 MG mouth daily.                                         



          capsule                                                     

 

          aspirin 81 MG EC Take 81 mg by           0                            

 Active



          tablet    mouth daily.                                         

 

          senna-docusate Take 1 tablet 30 tablet 0         2019

 Active



          (SENOKOT S) 8.6-50 by mouth 2                                         



          mg per tablet (two) times                                         



                    daily as                                          



                    needed for                                         



                    Constipation.                                         

 

          gabapentin Take 3    30 capsule 0         2019           Active



          (NEURONTIN) 100 MG capsules (300                                      

   



          capsule   mg total) by                                         



                    mouth 3                                           



                    (three) times                                         



                    daily.                                            

 

          atorvastatin Take 1 tablet 30 tablet 0         2019           Ac

tive



          (LIPITOR) 80 MG (80 mg total)                                         



          tablet    by mouth                                          



                    nightly For                                         



                    cholesterol,                                         



                    to prevent                                         



                    future stroke.                                         

 

          gabapentin Take 300 mg by           0                   2019 Dis

continued



          (NEURONTIN) 100 MG mouth nightly.                                     

    



          capsule                                                     

 

          dilTIAZem (DILACOR Take 120 mg by           0                   2019 Discontinued



          XR) 120 MG 24 hr mouth daily.                                         



          capsule                                                     







Active Problems







                          Problem                   Noted Date

 

                          Stroke                    2019

 

                          Expressive aphasia        2019

 

                          Hypertension              2017

 

                          Leukocytosis              2017

 

                          Erythrocytosis            2017

 

                          Elevated troponin         2017

 

                          Acidemia                  2017

 

                          Cerebellar stroke, acute  2017

 

                          Vertigo                   2016

 

                          UTI (urinary tract infection) 2016

 

                          Gastroesophageal reflux disease without esophagitis 

 

                          Diabetes type 2, controlled 2016

 

                          Hypothyroid               2016

 

                          Dehydration               2016







Encounters







             Date         Type         Specialty    Care Team    Description

 

             12/10/2019   Hospital     Cardiology   [facility]



                          Encounter                 Margo Alexandra MD 

 

             2019   Outside [facility]      [facility]

s (Primary



                                       Scheduling   Margo Alexandra MD Dx)

 

             2019   Travel                                 

 

             2019 - Hospital     General Internal [facility] Edmundo Amos langston type 2 

diabetes mellitus without complication, without long-term current use of insulin
(HCC) (Primary Dx);



                2019      Encounter       Medicine        MD Kyle



                                        Expressive aphasia;



                                                    Eric Hsieh, Essential

 hypertension;



                                                                MD



                                        Cerebrovascular accident (CVA), unspecif

ied mechanism (HCC)



                                                    David Sahu MD 



after 2019



Social History







             Tobacco Use  Types        Packs/Day    Years Used   Date

 

             Never Smoker                                        









                Alcohol Use     Drinks/Week     oz/Week         Comments

 

                No                                              









                          Sex Assigned at Birth     Date Recorded

 

                          Not on file               









                    Job Start Date      Occupation          Industry

 

                    Not on file         Not on file         Not on file









                    Travel History      Travel Start        Travel End









                                        No recent travel history available.







Last Filed Vital Signs







                    Vital Sign          Reading             Time Taken

 

                    Blood Pressure      144/67              2019  4:00 PM 

CST

 

                    Pulse               59                  2019  4:00 PM 

CST

 

                    Temperature         35.8 C (96.5 F) 2019  4:00 PM 

CST

 

                    Respiratory Rate    18                  2019  4:00 PM 

CST

 

                    Oxygen Saturation   98%                 2019  4:00 PM 

CST

 

                    Inhaled Oxygen Concentration -                   -

 

                    Weight              78 kg (172 lb 1 oz) 2019 12:13 AM 

CST

 

                    Height              170.2 cm (5' 7")    2019 12:13 AM 

CST

 

                    Body Mass Index     26.95               2019 12:13 AM 

CST







Plan of Treatment

Not on file



Procedures







             Procedure Name Priority     Date/Time    Associated   Comments



                                                    Diagnosis    

 

             RHYTHM STRIP - SCAN              2019  7:52              



                                       AM CST                    

 

             POCT-GLUCOSE METER Routine      2019  3:57              Resul

ts for this



                                       PM CST                    procedure are i

n



                                                                 the results



                                                                 section.

 

             POCT-GLUCOSE METER Routine      2019 12:33              Resul

ts for this



                                       PM CST                    procedure are i

n



                                                                 the results



                                                                 section.

 

             POCT-GLUCOSE METER Routine      2019  8:26              Resul

ts for this



                                       AM CST                    procedure are i

n



                                                                 the results



                                                                 section.

 

             CBC W/PLT COUNT & AUTO Routine      2019  4:57              R

esults for this



             DIFFERENTIAL              AM CST                    procedure are i

n



                                                                 the results



                                                                 section.

 

             MAGNESIUM    Routine      2019  4:57              Results for

 this



                                       AM CST                    procedure are i

n



                                                                 the results



                                                                 section.

 

             BASIC METABOLIC PANEL Routine      2019  4:57              Re

sults for this



             (7)                       AM CST                    procedure are i

n



                                                                 the results



                                                                 section.

 

             CBC W/PLT COUNT & AUTO Routine      2019  4:57              R

esults for this



             DIFFERENTIAL              AM CST                    procedure are i

n



                                                                 the results



                                                                 section.

 

             POCT-GLUCOSE METER Routine      2019 11:12              Resul

ts for this



                                       PM CST                    procedure are i

n



                                                                 the results



                                                                 section.

 

             ECHOCARDIOGRAM REPORT -              2019  9:10              



             SCAN                      PM CST                    

 

             POCT-GLUCOSE METER Routine      2019  8:31              Resul

ts for this



                                       PM CST                    procedure are i

n



                                                                 the results



                                                                 section.

 

             POCT-GLUCOSE METER Routine      2019  2:59              Resul

ts for this



                                       PM CST                    procedure are i

n



                                                                 the results



                                                                 section.

 

             CT/CTA CAROTID Routine      2019 12:15              Results f

or this



                                       PM CST                    procedure are i

n



                                                                 the results



                                                                 section.

 

             CTA BRAIN    Routine      2019 12:15              Results for

 this



                                       PM CST                    procedure are i

n



                                                                 the results



                                                                 section.

 

             ECG 12-LEAD  Routine      2019  9:49              Results for

 this



                                       AM CST                    procedure are i

n



                                                                 the results



                                                                 section.

 

             MR BRAIN WITH & WITHOUT Routine      2019  8:49              

Results for this



             IV CONTRAST               AM CST                    procedure are i

n



                                                                 the results



                                                                 section.

 

             POCT-GLUCOSE METER Routine      2019  7:57              Resul

ts for this



                                       AM CST                    procedure are i

n



                                                                 the results



                                                                 section.

 

             2D ECHO MODE W/O Routine      2019  7:37              Results

 for this



             DOPPLER                   AM CST                    procedure are i

n



                                                                 the results



                                                                 section.

 

             POCT-GLUCOSE METER Routine      2019  7:11              Resul

ts for this



                                       AM CST                    procedure are i

n



                                                                 the results



                                                                 section.

 

             C-REACTIVE PROTEIN Routine      2019  4:38              Resul

ts for this



                                       AM CST                    procedure are i

n



                                                                 the results



                                                                 section.

 

             TROPONIN I   Routine      2019  4:38              Results for

 this



                                       AM CST                    procedure are i

n



                                                                 the results



                                                                 section.

 

             LIPID PANEL  Routine      2019  4:38              Results for

 this



                                       AM CST                    procedure are i

n



                                                                 the results



                                                                 section.

 

             BASIC METABOLIC PANEL Routine      2019  4:38              Re

sults for this



             (7)                       AM CST                    procedure are i

n



                                                                 the results



                                                                 section.

 

             CBC W/PLT COUNT & AUTO Routine      2019  4:07              R

esults for this



             DIFFERENTIAL              AM CST                    procedure are i

n



                                                                 the results



                                                                 section.

 

             CBC W/PLT COUNT & AUTO Routine      2019  4:07              R

esults for this



             DIFFERENTIAL              AM CST                    procedure are i

n



                                                                 the results



                                                                 section.

 

             HEMOGLOBIN A1C Routine      2019  4:06              Results f

or this



                                       AM CST                    procedure are i

n



                                                                 the results



                                                                 section.

 

             XR CHEST 1 VIEW Routine      2019  3:45              Results 

for this



             PORTABLE/BEDSIDE              AM CST                    procedure a

re in



                                                                 the results



                                                                 section.

 

             VITAMIN B12 AND FOLATE Routine      2019  3:44              R

esults for this



                                       AM CST                    procedure are i

n



                                                                 the results



                                                                 section.

 

             TSH/FREE T4 IF Routine      2019  3:44              Results f

or this



             INDICATED                 AM CST                    procedure are i

n



                                                                 the results



                                                                 section.

 

             PROTHROMBIN TIME/INR Routine      2019  3:44              Res

ults for this



                                       AM CST                    procedure are i

n



                                                                 the results



                                                                 section.

 

             RPR          Routine      2019  3:43              Results for

 this



                                       AM CST                    procedure are i

n



                                                                 the results



                                                                 section.



after 2019



Results

RHYTHM STRIP - SCAN (2019  7:52 AM CST)





                          Narrative                 Performed At

 

                                        This result has an attachment that is no

t available.



                                        



POC-Glucose meter (2019  3:57 PM CST)Only the most recent of8 results
within the time period is included.





                Component       Value           Ref Range       Performed At

 

                POC-Glucose Meter 278 (H)Comment: : TESTED 70 - 110 mg/dL  [facility]



                                AT [facility] [address] [place] [address]

NTER



                                [facility], [address]:                 



                                /Technician ID =                 



                                [id number] for TONY CATARINO                 









                                        Specimen

 

                                        Blood









                Performing Organization Address         City/State/Zipcode Phone

 Number

 

                [address]

 [address] [phone]



                CENTER                                          



CBC with platelet count + automated diff (2019  4:57 AM CST)Only the most 
recent of2 resultswithin the time period is included.





                Component       Value           Ref Range       Performed At

 

                WBC             8.1             3.5 - 10.5 K/L [facility]

 

                RBC             4.83            3.93 - 5.22 M/L [facility]

 

                Hemoglobin      14.6            11.2 - 15.7 GM/DL [facility]

 

                Hematocrit      42.3            34.1 - 44.9 %   [facility]

 

                MCV             87.6            79.4 - 94.8 fL  [facility]

 

                MCH             30.2            25.6 - 32.2 pg  [facility]

 

                MCHC            34.5            32.2 - 35.5 GM/DL [facility]

 

                RDW             12.0            11.7 - 14.4 %   [facility]

 

                Platelets       195             150 - 450 K/CU MM [facility]

 

                MPV             12.6 (H)        9.4 - 12.3 fL   [facility]

 

                nRBC            0               0 - 0 /100 WBC  [facility]

 

                % Neutros       53              %               [facility]

 

                % Lymphs        34              %               [facility]

 

                % Monos         10              %               [facility]

 

                % Eos           2               %               [facility]

 

                % Baso          1               %               [facility]

 

                # Neutros       4.25            1.56 - 6.13 K/L [facility]

 

                # Lymphs        2.71            1.18 - 3.74 K/L [facility]

 

                # Monos         0.78 (H)        0.24 - 0.36 K/L [facility]

 

                # Eos           0.19            0.04 - 0.36 K/L [facility]

 

                # Baso          0.10 (H)        0.01 - 0.08 K/L [facility]

 

                Immature Granulocytes-Relative 1               0 - 1 %         C

[facility]









                                        Specimen

 

                                        Blood









                Performing Organization Address         City/[facility]/Zipcode Phone

 Number

 

                [address]

 [address] 

[phone]



                [place]                                          



Magnesium (2019  4:57 AM CST)





                Component       Value           Ref Range       Performed At

 

                Magnesium       1.9Comment: Specimen 1.6 - 2.6 mg/dL [facility]



                                moderately hemolyzed                 [facility]

TER









                                        Specimen

 

                                        Blood









                Performing Organization Address         City/[facility]

 Number

 

                [address]

 [address] [phone]



                [place]                                          



Basic Metabolic Panel (2019  4:57 AM CST)Only the most recent of2 results
within the time period is included.





                Component       Value           Ref Range       Performed At

 

                Sodium          138             136 - 145 meq/L [facility]

 

                Potassium       3.5Comment: Specimen 3.5 - 5.1 meq/L [facility]



                                moderately hemolyzed                 [facility]

TER

 

                Chloride        108 (H)         98 - 107 meq/L  [facility]

 

                CO2             23              22 - 29 meq/L   [facility]

 

                BUN             7               7 - 21 mg/dL    [facility]

 

                Creatinine      0.81Comment: Specimen 0.57 - 1.25 mg/dL [facility]



                                moderately hemolyzed                 MEDICAL [facility]

TER

 

                Glucose         291 (H)         70 - 105 mg/dL  [facility]

 

                Calcium         8.4             8.4 - 10.2 mg/dL [facility] H

[facility]

 

                EGFR            70Comment: ESTIMATED GFR IS mL/min/1.73 sq m [facility]



                                NOT AS ACCURATE AS CREATININE                 ME

DICAL CENTER



                                CLEARANCE IN PREDICTING                 



                                GLOMERULAR FILTRATION RATE.                 



                                ESTIMATED GFR IS NOT                 



                                APPLICABLE FOR DIALYSIS                 



                                PATIENTS.                       









                                        Specimen

 

                                        Blood









                Performing Organization Address         City/State/Zipcode Phone

 Number

 

                [facility] [address] [place]

 [address] [phone]



                CENTER                                          



ECHOCARDIOGRAM REPORT - SCAN (2019  9:10 PM CST)





                          Narrative                 Performed At

 

                                        This result has an attachment that is no

t available.



                                        



CTA carotid (2019 12:15 PM CST)





                                        Specimen

 

                                        









                          Narrative                 Performed At

 

                                        FINAL REPORT



                                        [place]



                                         



                                        



                                        PATIENT ID: [id number]



                                        



                                         



                                        



                                        CLINICAL HISTORY: Stroke, follow up



                                        



                                         



                                        



                                        TECHNIQUE: Initially, noncontrast head C

T images were performed. 



                                        



                                        Contiguous contrast-enhanced axial image

s through the neck followed 



                                        



                                        by axial images through the head with co

martita and sagittal 



                                        



                                        reformations to assess the arterial circ

ulation. 3-D reconstructions 



                                        



                                        were performed using a volume rendered t

echnique separately on a 



                                        



                                        workstation. 



                                        



                                         



                                        



                                        This exam was performed according to the

 departmental dose 



                                        



                                        optimization program which includes auto

mated exposure control, 



                                        



                                        adjustment of the mA and/or kV according

 to the patient size, and/or 



                                        



                                        use of an iterative reconstruction techn

ique.



                                        



                                         



                                        



                                        Stenosis evaluation reported in complian

ce with NASCET criteria.



                                        



                                         



                                        



                                        COMPARISON: MRI brain 2019, CTA he

ad and neck 2017



                                        



                                         



                                        



                                        FINDINGS:



                                        



                                         



                                        



                                        CTA head:



                                        



                                        Multifocal left border zone acute infarc

ts. Remote left cerebellar 



                                        



                                        hemisphere infarct. No acute intracrania

l hemorrhage. There is no 



                                        



                                        hydrocephalus or midline shift. The skul

l is intact. 



                                        



                                         



                                        



                                        There is atherosclerosis in the cavernou

s ICAs with a short segment 



                                        



                                        severe focal stenosis of the right ICA a

t the petrous/cavernous 



                                        



                                        junction. Moderate focal stenosis of the

 left communicating ICA. 



                                        



                                        Bilateral MCA and LYLA branches are paten

t. Posterior circulation is 



                                        



                                        unremarkable.



                                        



                                         



                                        



                                        The major intradural venous sinuses are 

patent.



                                        



                                         



                                        



                                         



                                        



                                        CTA neck:



                                        



                                        Great vessel origins: No occlusion or hi

gh-grade stenosis.



                                        



                                         



                                        



                                        Carotid arteries: No occlusion or high-g

rade stenosis. 



                                        



                                         



                                        



                                        Vertebral arteries: No occlusion or high

-grade stenosis.



                                        



                                         



                                        



                                        Mild degenerative changes of the cervica

l spine. Multinodular 



                                        



                                        thyroid; largest discrete nodule measure

s up to 2 cm and should be 



                                        



                                        further evaluated by ultrasound. Mild sc

arring of the visualized lung 



                                        



                                        apices.



                                        



                                         



                                        



                                         



                                        



                                        IMPRESSION:



                                        



                                        1.Multifocal infarcts in the left cerebr

al hemisphere, without 



                                        



                                        hemorrhage.



                                        



                                        2.Short segment severe focal stenosis of

 the right ICA at the 



                                        



                                        petrous/cavernous junction. Moderate foc

al stenosis of the left 



                                        



                                        communicating ICA. No intracranial arter

ial occlusion.



                                        



                                        3.No significant cervical arterial steno

sis.



                                        



                                         



                                        



                                        Signed: Lalo Gray MD



                                        



                                        Report Verified Date/Time:2019

 15:43:37



                                        



                                         



                                        



                          Electronically signed by: LALO GRAY MD on 2019 



                                        03:43 PM



                                        



                                                    









                                        Procedure Note

 

                                        Interface, External Ris In - 2019 

 3:45 PM CST



FINAL REPORT



                                        



                                        PATIENT ID:   [id number]



                                        



                                        CLINICAL HISTORY: Stroke, follow up



                                        



                                        TECHNIQUE: Initially, noncontrast head C

T images were performed.



                                        Contiguous contrast-enhanced axial image

s through the neck followed



                                        by axial images through the head with co

martita and sagittal



                                        reformations to assess the arterial circ

ulation. 3-D reconstructions



                                        were performed using a volume rendered t

echnique separately on a



                                        workstation.



                                        



                                        This exam was performed according to the

 departmental dose



                                        optimization program which includes auto

mated exposure control,



                                        adjustment of the mA and/or kV according

 to the patient size, and/or



                                        use of an iterative reconstruction techn

ique.



                                        



                                        Stenosis evaluation reported in complian

ce with NASCET criteria.



                                        



                                        COMPARISON: MRI brain 2019, CTA he

ad and neck 2017



                                        



                                        FINDINGS:



                                        



                                        CTA head:



                                        Multifocal left border zone acute infarc

ts. Remote left cerebellar



                                        hemisphere infarct. No acute intracrania

l hemorrhage. There is no



                                        hydrocephalus or midline shift. The skul

l is intact.



                                        



                                        There is atherosclerosis in the cavernou

s ICAs with a short segment



                                        severe focal stenosis of the right ICA a

t the petrous/cavernous



                                        junction. Moderate focal stenosis of the

 left communicating ICA.



                                        Bilateral MCA and LYLA branches are paten

t. Posterior circulation is



                                        unremarkable.



                                        



                                        The major intradural venous sinuses are 

patent.



                                        



                                        



                                        CTA neck:



                                        Great vessel origins: No occlusion or hi

gh-grade stenosis.



                                        



                                        Carotid arteries: No occlusion or high-g

rade stenosis.



                                        



                                        Vertebral arteries: No occlusion or high

-grade stenosis.



                                        



                                        Mild degenerative changes of the cervica

l spine. Multinodular



                                        thyroid; largest discrete nodule measure

s up to 2 cm and should be



                                        further evaluated by ultrasound. Mild sc

arring of the visualized lung



                                        apices.



                                        



                                        



                                        IMPRESSION:



                                        1.Multifocal infarcts in the left cerebr

al hemisphere, without



                                        hemorrhage.



                                        2.Short segment severe focal stenosis of

 the right ICA at the



                                        petrous/cavernous junction. Moderate foc

al stenosis of the left



                                        communicating ICA. No intracranial arter

ial occlusion.



                                        3.No significant cervical arterial steno

sis.



                                        



                                        Signed: Lalo Gray MD



                                        Report Verified Date/Time:  2019 1

5:43:37



                                        



                                          Electronically signed by: LALO GRAY MD on 2019 03:43 PM



                                        









                Performing Organization Address         City/State/Zipcode Phone

 Number

 

                [place]                                          



CTA brain (2019 12:15 PM CST)





                                        Specimen

 

                                        









                          Narrative                 Performed At

 

                                        FINAL REPORT



                                        [place]



                                         



                                        



                                        PATIENT ID: [id number]



                                        



                                         



                                        



                                        CLINICAL HISTORY: Stroke, follow up



                                        



                                         



                                        



                                        TECHNIQUE: Initially, noncontrast head C

T images were performed. 



                                        



                                        Contiguous contrast-enhanced axial image

s through the neck followed 



                                        



                                        by axial images through the head with co

martita and sagittal 



                                        



                                        reformations to assess the arterial circ

ulation. 3-D reconstructions 



                                        



                                        were performed using a volume rendered t

echnique separately on a 



                                        



                                        workstation. 



                                        



                                         



                                        



                                        This exam was performed according to the

 departmental dose 



                                        



                                        optimization program which includes auto

mated exposure control, 



                                        



                                        adjustment of the mA and/or kV according

 to the patient size, and/or 



                                        



                                        use of an iterative reconstruction techn

ique.



                                        



                                         



                                        



                                        Stenosis evaluation reported in complian

ce with NASCET criteria.



                                        



                                         



                                        



                                        COMPARISON: MRI brain 2019, CTA he

ad and neck 2017



                                        



                                         



                                        



                                        FINDINGS:



                                        



                                         



                                        



                                        CTA head:



                                        



                                        Multifocal left border zone acute infarc

ts. Remote left cerebellar 



                                        



                                        hemisphere infarct. No acute intracrania

l hemorrhage. There is no 



                                        



                                        hydrocephalus or midline shift. The skul

l is intact. 



                                        



                                         



                                        



                                        There is atherosclerosis in the cavernou

s ICAs with a short segment 



                                        



                                        severe focal stenosis of the right ICA a

t the petrous/cavernous 



                                        



                                        junction. Moderate focal stenosis of the

 left communicating ICA. 



                                        



                                        Bilateral MCA and LYLA branches are paten

t. Posterior circulation is 



                                        



                                        unremarkable.



                                        



                                         



                                        



                                        The major intradural venous sinuses are 

patent.



                                        



                                         



                                        



                                         



                                        



                                        CTA neck:



                                        



                                        Great vessel origins: No occlusion or hi

gh-grade stenosis.



                                        



                                         



                                        



                                        Carotid arteries: No occlusion or high-g

rade stenosis. 



                                        



                                         



                                        



                                        Vertebral arteries: No occlusion or high

-grade stenosis.



                                        



                                         



                                        



                                        Mild degenerative changes of the cervica

l spine. Multinodular 



                                        



                                        thyroid; largest discrete nodule measure

s up to 2 cm and should be 



                                        



                                        further evaluated by ultrasound. Mild sc

arring of the visualized lung 



                                        



                                        apices.



                                        



                                         



                                        



                                         



                                        



                                        IMPRESSION:



                                        



                                        1.Multifocal infarcts in the left cerebr

al hemisphere, without 



                                        



                                        hemorrhage.



                                        



                                        2.Short segment severe focal stenosis of

 the right ICA at the 



                                        



                                        petrous/cavernous junction. Moderate foc

al stenosis of the left 



                                        



                                        communicating ICA. No intracranial arter

ial occlusion.



                                        



                                        3.No significant cervical arterial steno

sis.



                                        



                                         



                                        



                                        Signed: Lalo Gray MD



                                        



                                        Report Verified Date/Time:2019

 15:43:37



                                        



                                         



                                        



                          Electronically signed by: LALO GRAY MD on 2019 



                                        03:43 PM



                                        



                                                    









                                        Procedure Note

 

                                        Interface, External Ris In - 2019 

 3:45 PM CST



FINAL REPORT



                                        



                                        PATIENT ID:   [id number]



                                        



                                        CLINICAL HISTORY: Stroke, follow up



                                        



                                        TECHNIQUE: Initially, noncontrast head C

T images were performed.



                                        Contiguous contrast-enhanced axial image

s through the neck followed



                                        by axial images through the head with co

martita and sagittal



                                        reformations to assess the arterial circ

ulation. 3-D reconstructions



                                        were performed using a volume rendered t

echnique separately on a



                                        workstation.



                                        



                                        This exam was performed according to the

 departmental dose



                                        optimization program which includes auto

mated exposure control,



                                        adjustment of the mA and/or kV according

 to the patient size, and/or



                                        use of an iterative reconstruction techn

ique.



                                        



                                        Stenosis evaluation reported in complian

ce with NASCET criteria.



                                        



                                        COMPARISON: MRI brain 2019, CTA he

ad and neck 2017



                                        



                                        FINDINGS:



                                        



                                        CTA head:



                                        Multifocal left border zone acute infarc

ts. Remote left cerebellar



                                        hemisphere infarct. No acute intracrania

l hemorrhage. There is no



                                        hydrocephalus or midline shift. The skul

l is intact.



                                        



                                        There is atherosclerosis in the cavernou

s ICAs with a short segment



                                        severe focal stenosis of the right ICA a

t the petrous/cavernous



                                        junction. Moderate focal stenosis of the

 left communicating ICA.



                                        Bilateral MCA and LYLA branches are paten

t. Posterior circulation is



                                        unremarkable.



                                        



                                        The major intradural venous sinuses are 

patent.



                                        



                                        



                                        CTA neck:



                                        Great vessel origins: No occlusion or hi

gh-grade stenosis.



                                        



                                        Carotid arteries: No occlusion or high-g

rade stenosis.



                                        



                                        Vertebral arteries: No occlusion or high

-grade stenosis.



                                        



                                        Mild degenerative changes of the cervica

l spine. Multinodular



                                        thyroid; largest discrete nodule measure

s up to 2 cm and should be



                                        further evaluated by ultrasound. Mild sc

arring of the visualized lung



                                        apices.



                                        



                                        



                                        IMPRESSION:



                                        1.Multifocal infarcts in the left cerebr

al hemisphere, without



                                        hemorrhage.



                                        2.Short segment severe focal stenosis of

 the right ICA at the



                                        petrous/cavernous junction. Moderate foc

al stenosis of the left



                                        communicating ICA. No intracranial arter

ial occlusion.



                                        3.No significant cervical arterial steno

sis.



                                        



                                        Signed: Lalo Gray MD



                                        Report Verified Date/Time:  2019 1

5:43:37



                                        



                                          Electronically signed by: LALO GRAY MD on 2019 03:43 PM



                                        









                Performing Organization Address         City/[employer]

 Number

 

                [facility]                                          



ECG 12 lead (2019  9:49 AM CST)





                                        Specimen

 

                                        









                          Narrative                 Performed At

 

                                        Ventricular Rate 68 BPM



                                        [facility]



                                        Atrial Rate 68 BPM



                                        



                                        P-R Interval 168 ms



                                        



                                        QRS Duration 138 ms



                                        



                                        Q-T Interval 476 ms



                                        



                                        QTC Calculation(Bazett) 506 ms



                                        



                                        P Axis 48 degrees



                                        



                                        R Axis 9 degrees



                                        



                                        T Axis -4 degrees



                                        



                                         



                                        



                                        Normal sinus rhythm



                                        



                                        Right bundle branch block



                                        



                                        Nonspecific ST and T wave abnormality



                                        



                                        Prolonged QT



                                        



                                        Abnormal ECG



                                        



                                        When compared with ECG of 2017 19

:07,



                                        



                                        ST less depressed now inferolateral lead

s



                                        



                                        ST depression andT wave inversion le

ss evident in Anterior leads



                                        



                          Confirmed by MD PICHARDO YOCHAI () on 2019 

4:58:09 PM 









                                        Procedure Note

 

                                        Interface, External Ris In - 2019 

 4:58 PM CST



Ventricular Rate 68 BPM



                                        Atrial Rate 68 BPM



                                        P-R Interval 168 ms



                                        QRS Duration 138 ms



                                        Q-T Interval 476 ms



                                        QTC Calculation(Bazett) 506 ms



                                        P Axis 48 degrees



                                        R Axis 9 degrees



                                        T Axis -4 degrees



                                        



                                        Normal sinus rhythm



                                        Right bundle branch block



                                        Nonspecific ST and T wave abnormality



                                        Prolonged QT



                                        Abnormal ECG



                                        When compared with ECG of 2017 19

:07,



                                        ST less depressed now inferolateral lead

s



                                        ST depression and  T wave inversion less

 evident in Anterior leads



                                        Confirmed by MD PICHARDO YOCHAI ()

 on 2019 4:58:09 PM









                Performing Organization Address         City/[facility]/[employer]

 Number

 

                [facility]                                         



MR brain without &amp; with IV contrast (2019  8:49 AM CST)





                                        Specimen

 

                                        









                          Narrative                 Performed At

 

                                        FINAL REPORT



                                        [facility]



                                         



                                        



                                        PATIENT ID: [id number]



                                        



                                         



                                        



                                        MRI Brain with and without contrast



                                        



                                         



                                        



                                        Clinical History: Stroke



                                        



                                         



                                        



                                        Technique: MRI of the brain utilizing ax

ial T1, T2, FLAIR, GRE, DWI, 



                                        



                                        sagittal T1; and postgadolinium axial, s

agittal, and coronal 



                                        



                                        T1-weighted images.



                                        



                                         



                                        



                                        Comparisons: MRI 2016



                                        



                                         



                                        



                                        Findings:



                                        



                                         



                                        



                                        There is acute infarction in the left ce

rebral deep white matter. 



                                        



                                        There are small acute cortical infarcts 

in the left parietal lobe. 



                                        



                                        There is no hemorrhage. There is a chron

ic encephalomalacic 



                                        



                                        inferomedial left cerebellar infarct wit

h hemosiderin staining. 



                                        



                                         



                                        



                                        There is no abnormal intracranial enhanc

ement or mass. There is mild 



                                        



                                        periventricular and subcortical white ma

tter T2 hyperintensity, which 



                                        



                                        is nonspecific but compatible with chron

ic microvascular ischemic 



                                        



                                        change. There is mild generalized sulcal

 prominence without 



                                        



                                        hydrocephalus, midline shift, or apparen

t mass effect. There are no 



                                        



                                        extra-axial fluid collections. The crani

ocervical junction is 



                                        



                                        preserved. The major intracranial flow-v

oids appear patent. 



                                        



                                         



                                        



                                        IMPRESSION:



                                        



                                         



                                        



                                        Acute infarction in the deep left cerebr

al white matter and involving 



                                        



                                        the left parietal cortex.



                                        



                                         



                                        



                                        No acute hemorrhage.



                                        



                                         



                                        



                                        Chronic left cerebellar infarct with rem

ote hemorrhage.



                                        



                                         



                                        



                                        No masslike intracranial enhancement.



                                        



                                         



                                        



                                        Signed: Tashia Marie MD



                                        



                                        Report Verified Date/Time:2019

 10:38:33



                                        



                                         



                                        



                                        Reading Location: [address]



                                        



                          Electronically signed by: TASHIA MARIE M.D. on 2019 



                                        10:38 AM



                                        



                                                    









                                        Procedure Note

 

                                        Interface, External Ris In - 2019 

10:40 AM CST



FINAL REPORT



                                        



                                        PATIENT ID:   [id number]



                                        



                                        MRI Brain with and without contrast



                                        



                                        Clinical History: Stroke



                                        



                                        Technique: MRI of the brain utilizing ax

ial T1, T2, FLAIR, GRE, DWI,



                                        sagittal T1; and postgadolinium axial, s

agittal, and coronal



                                        T1-weighted images.



                                        



                                        Comparisons: MRI 2016



                                        



                                        Findings:



                                        



                                        There is acute infarction in the left ce

rebral deep white matter.



                                        There are small acute cortical infarcts 

in the left parietal lobe.



                                        There is no hemorrhage. There is a chron

ic encephalomalacic



                                        inferomedial left cerebellar infarct wit

h hemosiderin staining.



                                        



                                        There is no abnormal intracranial enhanc

ement or mass. There is mild



                                        periventricular and subcortical white ma

tter T2 hyperintensity, which



                                        is nonspecific but compatible with chron

ic microvascular ischemic



                                        change. There is mild generalized sulcal

 prominence without



                                        hydrocephalus, midline shift, or apparen

t mass effect. There are no



                                        extra-axial fluid collections. The crani

ocervical junction is



                                        preserved. The major intracranial flow-v

oids appear patent.



                                        



                                        IMPRESSION:



                                        



                                        Acute infarction in the deep left cerebr

al white matter and involving



                                        the left parietal cortex.



                                        



                                        No acute hemorrhage.



                                        



                                        Chronic left cerebellar infarct with rem

ote hemorrhage.



                                        



                                        No masslike intracranial enhancement.



                                        



                                        Signed: Tashia Marie MD



                                        Report Verified Date/Time:  2019 1

0:38:33



                                        



                                        Reading Location: [facility]



                                              Electronically signed by: TASHIA MARIE M.D. on 2019 10:38 AM



                                        









                Performing Organization Address         City/State/Zipcode Phone

 Number

 

                [facility]                                          



2D Echo W/O Doppler(No Doppler) (2019  7:37 AM CST)





                Component       Value           Ref Range       Performed At

 

                Ejection Fraction                                 [facility] ECHO HEAR

TLAB MKCKESSON CPA









                                        Specimen

 

                                        









                          Narrative                 Performed At

 

                                        Transthoracic Echocardiography Report (T

TE)



                                        [facility] ECHO HEARTLAB MKCKESSON [facility]



                                         



                                        



                                         Demographics



                                        



                                         



                                        



                           Patient Name JOSE ORO Date of 



                                        Study 2019



                                        



                                        A



                                        



                                         



                                        



                           [id number] 



                                         GenderFem

darryn



                                        



                                         



                                        



                           Visit Number [id number] 



                                        [employer]

own



                                        



                                         



                                        



                           [id number] 



                                        m Number [id number]



                                        



                                         Number



                                        



                                         



                                        



                           Date of Birth1950 Referring 



                                        Physician Riley Holloway MD



                                        



                                         



                                        



                           Age68 year(s) 



                                        Sonographer Emiliano Mendoza 



                                        InterpretingJoseph CHAD del rosario MD



                                        



                           



                                         Physician



                                        



                                         



                                        



                                        Procedure



                                        



                                         



                                        



                                         Type of



                                        



                           Study TTE procedure:ECHO2D MODE W/O 



                                        DOPPLER



                                        



                                         



                                        



                                        Indications:Stroke .



                                        



                                         



                                        



                                        Clinical History



                                        



                                        BACK PAIN,DM,HTN



                                        



                                         



                                        



                          Height: 67 inches Weight: 78.02 kg (172 lbs) BSA: 



                                        1.9 m^2 BMI: 26.94 kg/m^2



                                        



                                         



                                        



                                        HR: 81 bpm BP: 187/80 mmHg



                                        



                                         



                                        



                                         Summary



                                        



                           Limited 2D exam (no Doppler) to address study 



                                        indication.



                                        



                           IV saline contrast injection was negative for a 



                                        PFO (patent foramen ovale)



                                        



                                         at rest and post Valsalva .



                                        



                           The left ventricle is chamber size (by PSLAX 



                                        dimension) is normal (female



                                        



                           - LVIDd 3.8-5.2cm) . All of the LV segments 



                                        contract normally . Global LV



                                        



                           systolic function normal . Estimated LVEF by 



                                        qualitative assessment is



                                        



                                         normal (>60%) .



                                        



                                         



                                        



                                         Previous Study



                                        



                           Compared to the previous study 1-5-17 there was 



                                        no significant change.



                                        



                                         



                                        



                                         Signature



                                        



                                         



                                        



                           ------------------------------------------------- 



                                        ---------------



                                        



                           Electronically signed by Joseph Iqbal MD(Interpreting



                                        



                                         physician) on 2019 09:12 AM



                                        



                           ------------------------------------------------- 



                                        ---------------



                                        



                                         



                                        



                                         Findings



                                        



                                         



                                        



                           Left Ventricle Limited 2D exam 



                                        (no Doppler) to address study



                                        



                          in 



                                        dication.



                                        



                                         



                                        



                          Th 



                                        e left ventricle is chamber size (by PSL

AX



                                        



                          di 



                                        mension) is normal (female - LVIDd 3.8-5

.2cm) .



                                        



                          Sadi 



                                        rderline concentric LV hypertrophy.



                                        



                          Al 



                                        l of the LV segments contract normally .



                                        



                          Gl 



                                        obal LV systolic function normal .



                                        



                          Es 



                                        timated LVEF by qualitative assessment i

s normal



                                        



                          (> 



                                        60%) .



                                        



                                         



                                        



                           Left AtriumLA size is 



                                        mildly enlarged (35-41 ml/m2) .



                                        



                                         



                                        



                           Right VentricleThe right 



                                        ventricular chamber size and systolic



                                        



                          fu 



                                        nction are within normal limits.



                                        



                                         



                                        



                           Right Atrium RA cavity size 



                                        is normal .



                                        



                                         



                                        



                           Atrial SeptumIV saline 



                                        contrast injection was negative for a PF

O



                                        



                          (p 



                                        atent foramen ovale) at rest and post Va

lsalva .



                                        



                                         



                                        



                           Aortic Valve Mild AoV cusp 



                                        thickening.



                                        



                          Ao 



                                        V cusp mobility is normal .



                                        



                                         



                                        



                           Mitral Valve Mild MV leaflet 



                                        thickening.



                                        



                          Mi 



                                        ld mitral annular calcification.



                                        



                                         



                                        



                           Tricuspid ValveTV structure is 



                                        normal.



                                        



                                         



                                        



                           Pulmonic Valve PV is not well 



                                        visualized.



                                        



                                         



                                        



                           AortaAortic 



                                        root size (SInus of Valsalva diameter) i

s



                                        



                          no 



                                        rmal .



                                        



                                         



                                        



                           PericardiumNo significant 



                                        pericardial effusion is visualized.



                                        



                                         



                                        



                           IVC/SVC/PA/PV/PleuralThe estimated RA 



                                        pressure by IVC dynamics 5-10mmHg



                                        



                                        

.



                                        



                                         



                                        



                                        Chambers/Structures



                                        



                                         



                                        



                                         Left Atrium



                                        



                                         



                                        



                           LA Volume: 69.33         



                          ml LA 



                                        Area: 21.56 cm^2



                                        



                                         LA Vol. Index: 36 ml/m^2



                                        



                                         



                                        



                                         Left Ventricle



                                        



                                         



                                        



                                         LVIDd: 5.16 cm



                                        



                                         LV Septum Diastolic: 1.11 cm



                                        



                                         LV PW Diastolic: 1.15 cm



                                        



                                         



                                        



                                         LVOT Diameter: 2.04 cm



                                        



                                         



                                        



                                        Aorta



                                        



                                         



                                        



                                         Ao Root S of Laxmi.: 3.3 cm



                                        



                                         



                                        



                                        Doppler/Quantitative Measurements



                                        



                                         



                                        



                                         LVOT



                                        



                                         



                                        



                                         LVOT Diameter: 2.04 cm



                                        



                                         LVOT Area: 3.27 cm^2



                                        



                                                    









                                        Procedure Note

 

                                        Interface, External Ris In - 2019 

 9:12 AM CST



Transthoracic Echocardiography Report (TTE)



                                        



                                         Demographics



                                        



                                         Patient Name   JOSE ORO Date of

 Study       2019



                                                        A



                                        



                                         MRN            [id number]         Gender 

             Female



                                        



                                         Visit Number   [id number]       Race   

             Unknown



                                        



                                         Accession      [id number]        Room [place] [id number]



                                         Number



                                        



                                         Date of Birth  1950       [facility] Riley Holloway [facility]



                                        



                                         Age            69 year(s)       Sonogra

gwen [facility]



                                        



                                         Analyst        Cristina Mendoza Interpr

eting        Joseph Iqbal MD



                                                                         Physici

an



                                        



                                        Procedure



                                        



                                         Type of



                                         Study     TTE procedure:ECHO2D MODE W/O

 DOPPLER



                                        



                                        Indications:Stroke .



                                        



                                        Clinical History



                                        BACK PAIN,DM,HTN



                                        



                                        Height: 67 inches Weight: 78.02 kg (172 

lbs) BSA: 1.9 m^2 BMI: 26.94 kg/m^2



                                        



                                        HR: 81 bpm BP: 187/80 mmHg



                                        



                                         Summary



                                         Limited 2D exam (no Doppler) to address

 study indication.



                                         IV saline contrast injection was negati

ve for a PFO (patent foramen ovale)



                                         at rest and post Valsalva .



                                         The left ventricle is chamber size (by 

PSLAX dimension) is normal (female



                                         - LVIDd 3.8-5.2cm) . All of the LV segm

ents contract normally . Global LV



                                         systolic function normal . Estimated LV

EF by qualitative assessment is



                                         normal (>60%) .



                                        



                                         Previous Study



                                         Compared to the previous study 1-5-17 t

here was no significant change.



                                        



                                         Signature



                                        



                                         ---------------------------------------

-------------------------



                                         Electronically signed by Joseph boyd MD(Interpreting



                                         physician) on 2019 09:12 AM



                                         ---------------------------------------

-------------------------



                                        



                                         Findings



                                        



                                         Left Ventricle         Limited 2D exam 

(no Doppler) to address study



                                                                indication.



                                        



                                                                The left ventric

le is chamber size (by PSLAX



                                                                dimension) is no

rmal (female - LVIDd 3.8-5.2cm) .



                                                                Borderline echo

ntric LV hypertrophy.



                                                                All of the LV se

gments contract normally .



                                                                Global LV systol

ic function normal .



                                                                Estimated LVEF b

y qualitative assessment is normal



                                                                (>60%) .



                                        



                                         Left Atrium            LA size is mildl

y enlarged (35-41 ml/m2) .



                                        



                                         Right Ventricle        The right ventri

cular chamber size and systolic



                                                                function are wit

hin normal limits.



                                        



                                         Right Atrium           RA cavity size i

s normal .



                                        



                                         Atrial Septum          IV saline contra

st injection was negative for a PFO



                                                                (patent foramen 

ovale) at rest and post Valsalva .



                                        



                                         Aortic Valve           Mild AoV cusp th

ickening.



                                                                AoV cusp mobilit

y is normal .



                                        



                                         Mitral Valve           Mild MV leaflet 

thickening.



                                                                Mild mitral dary

lar calcification.



                                        



                                         Tricuspid Valve        TV structure is 

normal.



                                        



                                         Pulmonic Valve         PV is not well v

isualized.



                                        



                                         Aorta                  Aortic root size

 (SInus of Valsalva diameter) is



                                                                normal .



                                        



                                         Pericardium            No significant p

ericardial effusion is visualized.



                                        



                                         IVC/SVC/PA/PV/Pleural  The estimated RA

 pressure by IVC dynamics 5-10mmHg



                                                                .



                                        



                                        Chambers/Structures



                                        



                                         Left Atrium



                                        



                                         LA Volume: 69.33 ml                    

 LA Area: 21.56 cm^2



                                         LA Vol. Index: 36 ml/m^2



                                        



                                         Left Ventricle



                                        



                                         LVIDd: 5.16 cm



                                         LV Septum Diastolic: 1.11 cm



                                         LV PW Diastolic: 1.15 cm



                                        



                                         LVOT Diameter: 2.04 cm



                                        



                                        Aorta



                                        



                                         Ao Root S of Laxmi.: 3.3 cm



                                        



                                        Doppler/Quantitative Measurements



                                        



                                         LVOT



                                        



                                         LVOT Diameter: 2.04 cm



                                         LVOT Area: 3.27 cm^2



                                        









                Performing Organization Address         City/[facility]/Zipcode Phone

 Number

 

                [facility]                              

   



C-Reactive Protein (2019  4:38 AM CST)





                Component       Value           Ref Range       Performed At

 

                CRP             1.16 (H)        0.00 - 0.50 mg/dL [facility] CENTER









                                        Specimen

 

                                        Blood









                Performing Organization Address         City/[facility]/Zipcode Phone

 Number

 

                [facility] [address] [place]

 [address] [phone]



                CENTER                                          



Troponin I (2019  4:38 AM CST)





                Component       Value           Ref Range       Performed At

 

                Troponin I      <0.01           0.00 - 0.03 ng/mL [facility]









                                        Specimen

 

                                        Blood









                          Narrative                 Performed At

 

                          Troponin I (TnI) levels must be interpreted [facility]



                          in the context of the presenting symptoms and 



                          the clinical findings. Elevated TnI levels 



                          indicate myocardial damage, but are not 



                          specific for ischemic heart disease. Elevated 



                          TnI levels are seen in patients with other 



                          cardiac conditions (including myocarditis and 



                          congestive heart failure), and slight TnI 



                          elevations occur in patients with other 



                          conditions, including sepsis, renal failure, 



                          acidosis, acute neurological disease, and 



                          persistent tachyarrhythmia. 









                Performing Organization Address         City/[facility]

 Number

 

                [address]

 [address] [phone]



                [place]                                          



Lipid panel (2019  4:38 AM CST)





                Component       Value           Ref Range       Performed At

 

                Triglycerides   189Comment: Specimen slightly mg/dL           [facility]



                                [facility]

 

                Cholesterol     177Comment: Specimen slightly mg/dL           [facility]

 

                HDL             42              mg/dL           [facility]

 

                LDL Calculated  97              mg/dL           [facility]









                                        Specimen

 

                                        Blood









                          Narrative                 Performed At

 

                                        Triglyceride Reference Range:



                                        [facility]



                                         Low Risk <150



                                        



                                         [id number]



                                        



                                         High Risk 200-499



                                        



                                         Very High Risk>=500



                                        



                                         



                                        



                                        Cholesterol Reference Range:



                                        



                                         Low Risk <200



                                        



                                         [id number] 



                                        



                                         High Risk>240



                                        



                                         



                                        



                                        HDL Cholesterol Reference Range:



                                        



                                         Low Risk >=60



                                        



                                         High Risk <40



                                        



                                         



                                        



                                        LDL Cholesterol Reference Range:



                                        



                                         Optimal<100



                                        



                                         Near [id number]



                                        



                                         [id number]



                                        



                                         [id number]



                                        



                           Very High >=190 









                Performing Organization Address         City/[facility] Phone

 Number

 

                [address]

 [address] [phone]



                [place]                                          



Hemoglobin A1c (2019  4:06 AM CST)





                Component       Value           Ref Range       Performed At

 

                Hemoglobin A1C  11.1 (H)        4.3 - 6.1 %     [facility]









                                        Specimen

 

                                        Blood









                Performing Organization Address         City/[facility]/Zipcode Phone

 Number

 

                [address]

 [id number] [phone]



                [place]                                          



XR chest 1 view portable / bedside (2019  3:45 AM CST)





                                        Specimen

 

                                        









                          Narrative                 Performed At

 

                                        FINAL REPORT



                                        [facility]



                                         



                                        



                                        PATIENT ID: [id number]



                                        



                                         



                                        



                                         



                                        



                                        History: CVA.



                                        



                                         



                                        



                                        Comparison: 2016



                                        



                                         



                                        



                                        Findings:



                                        



                                         



                                        



                                        A single view of the chest is submitted.



                                        



                                         



                                        



                                        The cardiac silhouette is within normal 

limits for size. There is 



                                        



                                        atherosclerotic calcification of the aor

ta. 



                                        



                                         



                                        



                                        The lung volumes are slightly low. There

 is no focal consolidation, 



                                        



                                        pneumothorax, large pleural effusion or 

evidence of overt pulmonary 



                                        



                                        edema.



                                        



                                         



                                        



                                        There is no acute bony abnormality.



                                        



                                         



                                        



                                        Signed: Charles Beaulieu MD



                                        



                                        Report Verified Date/Time:2019

 05:59:01



                                        



                                         



                                        



                          Electronically signed by: CHARLES BEAULIEU M.D. on 2019 



                                        05:59 AM



                                        



                                                    









                                        Procedure Note

 

                                        Interface, External Ris In - 2019 

 6:01 AM CST



FINAL REPORT



                                        



                                        PATIENT ID:   [id number]



                                        



                                        



                                        History: CVA.



                                        



                                        Comparison: 2016



                                        



                                        Findings:



                                        



                                        A single view of the chest is submitted.



                                        



                                        The cardiac silhouette is within normal 

limits for size. There is



                                        atherosclerotic calcification of the aor

ta.



                                        



                                        The lung volumes are slightly low. There

 is no focal consolidation,



                                        pneumothorax, large pleural effusion or 

evidence of overt pulmonary



                                        edema.



                                        



                                        There is no acute bony abnormality.



                                        



                                        Signed: Charles Beaulieu MD



                                        Report Verified Date/Time:  2019 0

5:59:01



                                        



                                              Electronically signed by: CHARLES SOMMER M.D. on 2019 05:59 AM



                                        









                Performing Organization Address         City/[facility]

 Number

 

                [facility]                                          



Vitamin B12 and Folate (2019  3:44 AM CST)





                Component       Value           Ref Range       Performed At

 

                Vitamin B12     839 (H)         213 - 816 pg/mL [facility]

 

                Folate          36.1            >=7.0 ng/mL     [facility]









                                        Specimen

 

                                        Blood









                Performing Organization Address         City/[facility]/Zipcode Phone

 Number

 

                [facility] [address] [place], [place]

 [id number] [phone]



                CENTER                                          



TSH/Free T4 If Indicated (2019  3:44 AM CST)





                Component       Value           Ref Range       Performed At

 

                TSH             1.57            0.35 - 4.94 uIU/mL [facility]









                                        Specimen

 

                                        Blood









                Performing Organization Address         City/State/Zipcode Phone

 Number

 

                [facility] [address] [place]

 [address] 

[phone]



                CENTER                                          



Prothrombin time/INR (2019  3:44 AM CST)





                Component       Value           Ref Range       Performed At

 

                Protime         14.1            11.9 - 14.2 seconds [facility]

 

                INR             1.2             <=5.9           [facility]









                                        Specimen

 

                                        Blood









                          Narrative                 Performed At

 

                          Effective 2019: PT Reference Range [facility]



                                        Change



                                        



                                        New: 11.9-14.2Previous: 11.7-14.7



                                        



                                         



                                        



                          RECOMMENDED COUMADIN/WARFARIN INR THERAPY 



                                        RANGES



                                        



                          STANDARD DOSE: 2.0-3.0Includes: 



                          PROPHYLAXIS for venous thrombosis, systemic 



                          embolization; TREATMENT for venous thrombosis 



                                        and/or pulmonary embolus.



                                        



                          HIGH RISK: Target INR is 2.5-3.5 for patients 



                          wiht mechanical heart valves. 









                Performing Organization Address         City/[facility]/Zipcode Phone

 Number

 

                [address]

 [address] 

[phone]



                CENTER                                          



RPR (2019  3:43 AM CST)





                Component       Value           Ref Range       Performed At

 

                RPR             Nonreactive     Nonreactive     [facility]









                                        Specimen

 

                                        Blood









                Performing Organization Address         City/[facility]/Zipcode Phone

 Number

 

                [address]

 [address] 

[phone]



                CENTER                                          



after 2019



Insurance







           Payer      Benefit Plan / Subscriber ID Type       Phone      Address



                      Group                                       

 

           MEDICARE   MEDICARE A B xxxxxxxxxxx Medicare              

 

           BLUE [place]UE [place]MN[place] xxxxxxxxxxxx [facility]        [phone]

[address]







           [place]     [place]                                          [place]



                                                                  [address]









           Guarantor Name Account Type Relation to Date of    Phone      Billing



                                 Patient    Birth                 Address

 

           Jose Oro Personal/Family Self       1950 [phone]                                       (Home)     [place] [address]







Advance Directives

For more information, please contact:[address] [phone]





                Code Status     Date Activated  Date Inactivated Comments

 

                Full Code       2019  4:28 AM 2019  7:23 PM 









                    This code status was determined by: Patient             









                                                                

 

                Full Code       2017  3:39 PM 2017  5:04 PM 









                    This code status was determined by: Patient             









                                                                

 

                Full Code       2016  4:42 AM 2017  3:39 PM 









                    This code status was determined by: Patient

## 2020-06-09 NOTE — XMS REPORT
Continuity of Care Document

                             Created on:2020



Patient:JOSE ORO

Sex:Female

:1950

External Reference #:[id number]





Demographics







                          Address                   [address] [place] [address]

 

                          Home Phone                [phone]

 

                          Mobile Phone              [phone]

 

                          Email Address             [email or web address]

 

                          Preferred Language        English

 

                          Marital Status            Unknown

 

                          Worship Affiliation     Unknown

 

                          Race                      Unknown

 

                          Additional Race(s)        Unavailable



                                                    White

 

                          Ethnic Group              Not  or 









Author







                          Organization              [facility]

t

 

                          Address                   [address] [place] [address] [address]



                                                    [place] [address]

 

                          Phone                     [phone]









Support







                Name            Relationship    Address         Phone

 

                Dayna       Daughter        Unavailable     [phone]









Care Team Providers







                    Name                Role                Phone

 

                    [facility] Arguello      Primary Care Physician Unavailable

 

                    Nasrin Couch MD Attending Clinician [phone]

[phone]

 

                    Nasrin Couch MD Attending Clinician [phone]

[phone]

 

                    KYLE MINAYA       Attending Clinician Unavailable

 

                    Kyle Minaya MD    Attending Clinician [phone]

 

                    Мария PLAZA         Attending Clinician [phone]

 

                    Ashley PLAZA              Attending Clinician [phone]

 

                    ASHLEY                 Admitting Clinician Unavailable









Payers







           Payer Name Policy     Policy Number Effective  Expiration Source



                      Type                  Date       Date       

 

           MEDICAREMEDICARE A            xxxxxxxxxxx                       [facility]

t



           [facility]

 

           BLUE CROSS/BLUE            xxxxxxxxxxxx                       [facility]



           [facility] [place]                                             L

[facility]



           [id number]                                             

[address]



           [id number]                                             



           [address]                                             







Problems







       Condition Condition Condition Status Onset  Resolution Last   Treating Co

mments 

Source



       Name   Details Category        Date   Date   Treatment Clinician        



                                                 Date                 

 

       Stroke Stroke Disease Active 2019                             [facility]



                                    -



                                   00:00:                             Medical



                                   00                                 Center

 

       Expressive Expressive Disease Active 2019                             [facility]



       aphasia aphasia                -



                                   00:00:                             Medical



                                   00                                 [facility]

 

       Hypertensi Hypertensi Disease Active                              [facility]



       on     on                    -



                                   00:00:                             Medical



                                   00                                 [facility]

 

       Leukocytos Leukocytos Disease Active                              [facility]



       is     is                    -



                                   00:00:                             Medical



                                   00                                 [facility]

 

       Erythrocyt Erythrocyt Disease Active                              [facility]



       osis   osis                                                [facility]



                                   [facility]                             [facility]



                                                                    [facility]

 

       Elevated Elevated Disease Active                              [facility]

t



       troponin troponin                                              [facility]



                                   [facility]:                             [facility]



                                                                    [facility]

 

       Acidemia Acidemia Disease Active                              [facility]

t



                                                                  [facility]



                                   00[facility]                             [facility]



                                                                    [facility]

 

       Cerebellar Cerebellar Disease Active                              [facility]



       stroke, stroke,                                              [facility]



       acute  acute                00:00:                             Medical



                                                                    [facility]

 

       Vertigo Vertigo Disease Active 2016                             [facility]



                                                                  [facility]



                                   [facility]                             [facility]



                                                                    [facility]

 

       UTI    UTI    Disease Active 2016                             [facility]



       (urinary (urinary                                              [facility] 

-



       tract  tract                00:00:                             Medical



       infection) infection)               00                                 Ce

nter

 

       Gastroesop Gastroesop Disease Active 2016                             [facility]



       hageal hageal                                              [facility]



       reflux reflux               00:00:                             Medical



       disease disease               00                                 [facility]



       without without                                                  



       esophagiti esophagiti                                                  



       s      s                                                       

 

       Diabetes Diabetes Disease Active 2016                             [facility]

t



       type 2, type 2,                                              [facility]



       controlled controlled               00:00:                             Me

dical



                                   00                                 [facility]

 

       Hypothyroi Hypothyroi Disease Active 2016                             [facility]



       d      d                                                   [facility]



                                   00[facility]:                             [facility]



                                                                    [facility]

 

       Dehydratio Dehydratio Disease Active 2016                             [facility]



       n      n                                                   [facility]



                                   00:00:                             [facility]



                                   00                                 [facility]







Allergies, Adverse Reactions, Alerts







       Allergy Allergy Status Severity Reaction(s) Onset  Inactive Treating Comm

ents 

Source



       Name   Type                        Date   Date   Clinician        

 

       Codeine Drug   Active        Itching, 2016               [facility]

t



              Intolera               Rash                    [facility]



              [facility]                         00:00:                      Medical



                                          00                          [facility]







Social History







           Social Habit Start Date Stop Date  Quantity   Comments   Source

 

           Sex Assigned At Birth                                             [facility]









                Smoking Status  Start Date      Stop Date       Source

 

                Never smoker                                    [facility]

edical [place]







Medications







       Ordered Filled Start  Stop   Current Ordering Indication Dosage Frequency

 Signature

                    Comments            Components          Source



     Medication Medication Date Date Medication? Clinician                (SIG) 

          



     Name Name                                                   

 

     dilTIAZem      2019- No             120mg QD   Take 120           [facility]



     (DILACOR      1-30 11-30                          mg by           [facility]



     XR) 120 MG      15:33: 00:00                          mouth           Medic

al



     24 hr      01   :00                           daily.           [facility]



     capsule                                                        

 

     gabapentin      2019- No             300mg QD   Take 300           [facility]

[facility]



     (NEURONTIN)      1-30 11-30                          mg by           [facility] 

-



     100 MG      15:28: 00:00                          mouth           Medical



     capsule      29   :00                           nightly.           Center

 

     gabapentin      2019      Yes            300mg [id number] Take 3         

  [facility]



     (NEURONTIN)      1-30                          [id number] capsules         

  [facility]



     100 MG      00:00:                          3D   (300 mg           Medical



     capsule      00                                 total) by           Center



                                                  mouth 3           



                                                  (three)           



                                                  times           



                                                  daily.           

 

     atorvastati      2019      Yes            80mg QD   Take 1           [facility]



     n (LIPITOR)      1-30                               tablet (80           [facility]

[facility] -



     80 MG      00:00:                               mg total)           Medical



     tablet      00                                 by mouth           Center



                                                  nightly           



                                                  For            



                                                  cholestero           



                                                  l, to           



                                                  prevent           



                                                  future           



                                                  stroke.           

 

     senna-docus      2019 2020- No             1{tbl}      Take 1           [facility]

[facility]



     ate       1-30 11-29                          tablet by           [facility]



     (SENOKOT S)      00:00: 23:59                          mouth 2           Me

dical



     8.6-50 mg      00   :00                           (two)           Center



     per tablet                                         times           



                                                  daily as           



                                                  needed for           



                                                  Constipati           



                                                  on.            

 

     dilTIAZem      2019      Yes            120mg Q.5D Take 120           [facility]



     (CARDIZEM)      1-29                               mg by           [facility]



     120 MG      00:22:                               mouth 2           Medical



     tablet      09                                 (two)           Center



                                                  times           



                                                  daily.           

 

     furosemide      2019      Yes            40mg Q.5D Take 40 mg           [facility]

[facility]



     (LASIX) 40      1-29                               by mouth 2           [facility]

es -



     MG tablet      00:22:                               (two)           Medical



               09                                 times           Center



                                                  daily.           

 

     potassium      2019      Yes            10meq QD   Take 10           [facility]



     chloride      1-29                               mEq by           [facility]



     (KLOR-CON)      00:22:                               mouth           Medica

l



     10 MEQ CR      09                                 daily.           Center



     tablet                                                        

 

     atenolol      2019      Yes            100mg Q.5D Take 100           [facility]



     (TENORMIN)      1-29                               mg by           [facility]



     100 MG      00:22:                               mouth 2           Medical



     tablet      09                                 (two)           Center



                                                  times           



                                                  daily.           

 

     lansoprazol      2019      Yes            15mg QD   Take 15 mg           

[facility]



     e         1-29                               by mouth           [facility]



     (PREVACID)      00:22:                               daily.           Medic

al



     15 MG      09                                                Center



     capsule                                                        

 

     aspirin 81      2019      Yes            81mg QD   Take 81 mg           [facility]

[facility]



     MG EC      1-29                               by mouth           [facility]



     tablet      00:22:                               daily.           Medical



               09                                                Center

 

     lisinopril      2019      Yes            40mg Q.5D Take 40 mg           [facility]

[facility]



     (PRINIVIL,Z      -                               by mouth 2           [facility]

[facility] -



     ESTRIL) 40      00:22:                               (two)           Medica

l



     MG tablet      08                                 times           Center



                                                  daily.           

 

     cloNIDine      2019      Yes            .2mg Q.[id number] Take 0.2         

  [facility]



     HCl                                 [id number] mg by           [facility]



     (CATAPRES)      00:18:                          3D   mouth 3           Medi

gerardo



     0.2 MG      06                                 (three)           Center



     tablet                                         times           



                                                  daily.           

 

     glimepiride      2019      Yes            4mg  Q.5D Take 4 mg           [facility]

[facility]



     (AMARYL) 4                                     by mouth 2           [facility]

es -



     MG tablet      00:18:                               (two)           Medical



               06                                 times           Center



                                                  daily.           

 

     thyroid,            Yes            30mg      Take 1           [facility]



     pork, 30 mg      1-23                               tablet (30           [facility]

[facility] -



     Tab       00:00:                               mg total)           Medical



               00                                 by mouth           Center



                                                  Every           



                                                  morning on           



                                                  an empty           



                                                  stomach.           

 

     pantoprazol            Yes            40mg QD   Take 1           [facility]



     e         1-23                               tablet (40           [facility]



     (PROTONIX)      00:00:                               mg total)           Me

dical



     40 MG      00                                 by mouth           Center



     tablet                                         daily.           







Vital Signs







             Vital Name   Observation Time Observation Value Comments     Source

 

             Systolic blood 2019 16:00:00 144 mm[Hg]                [facility]



             pressure                                            [facility]

 

             Diastolic blood 2019 16:00:00 67 mm[Hg]                 [facility]

 

             Heart rate   2019 16:00:00 59 /min                   [facility]

 

             Body temperature 2019 16:00:00 35.83 Zabrina                 [facility]

 

             Respiratory rate 2019 16:00:00 18 /min                   [facility]

 

             Oxygen saturation in 2019 16:00:00 98 /min                   

[facility]



             Arterial blood by                                        Medical Ce

nter



             Pulse oximetry                                        

 

             Body height  2019 00:13:00 170.2 cm                  [facility]

 

             Body weight Measured 2019 00:13:00 78.047 kg                 

[facility]

 

             BMI          2019 00:13:00 26.95 kg/m2               [facility]







Procedures







                Procedure       Date / Time     Performing Clinician Source



                                Performed                       

 

                RHYTHM STRIP - SCAN 2019 07:52:10 Provider, Nikolay [facility]

 

                POCT-GLUCOSE METER 2019 15:57:00 Ashley NatohiwotDerrick  [facility]

 

                POCT-GLUCOSE METER 2019 12:33:00 AshleyDavid  [facility]

 

                POCT-GLUCOSE METER 2019 08:26:00 Ashley MadonnaRameshIsabel  [facility]

 

                BASIC METABOLIC PANEL (7) 2019 04:57:00 [facility]

 

                MAGNESIUM       2019 04:57:00 [facility]

 

                CBC W/PLT COUNT & AUTO 2019 04:57:00 [facility]

 

                POCT-GLUCOSE METER 2019 23:12:00 Ashley NatoChristieg  [facility]

 

                ECHOCARDIOGRAM REPORT - 2019 21:10:25 Provider, Nikolay 

[facility]

 

                POCT-GLUCOSE METER 2019 20:31:00 AshleyDavid  [facility]

 

                POCT-GLUCOSE METER 2019 14:59:00 Madonna SahuDerrick  [facility]

 

                CTA BRAIN       2019 12:15:00 Redd Carpio [facility]

 

                CT/CTA CAROTID  2019 12:15:00 Redd Carpio [facility]

 

                ECG 12-LEAD     2019 09:49:18 Redd Carpio [facility]

 

                MR BRAIN WITH & WITHOUT IV 2019 08:49:00 Redd Carpio [facility]

 

                POCT-GLUCOSE METER 2019 07:57:00 David Sahu  [facility]

 

                2D ECHO MODE W/O DOPPLER 2019 07:37:16 Redd Carpio [facility]

 

                POCT-GLUCOSE METER 2019 07:11:00 Eric Hsieh [facility]

 

                BASIC METABOLIC PANEL (7) 2019 04:38:00 Redd Carpio [facility]

 

                LIPID PANEL     2019 04:38:00 Redd Carpio [facility]

 

                TROPONIN I      2019 04:38:00 Redd Carpio [facility]

 

                C-REACTIVE PROTEIN 2019 04:38:00 Redd Carpio [facility]

 

                CBC W/PLT COUNT & AUTO 2019 04:07:00 Redd Carpio 

[facility]

 

                HEMOGLOBIN A1C  2019 04:06:00 Redd Carpio [facility]

 

                XR CHEST 1 VIEW 2019 03:45:00 Redd Carpio [facility]                                 [facility]

 

                PROTHROMBIN TIME/INR 2019 03:44:00 Redd Carpio 

I [facility]

 

                TSH/FREE T4 IF INDICATED 2019 03:44:00 Redd Carpio [facility]

 

                VITAMIN B12 AND FOLATE 2019 03:44:00 Redd Carpio 

[facility]

 

                RPR             2019 03:43:00 Redd Carpio [facility]







Encounters







        Start   End     Encounter Admission Attending Care    Care    Encounter 

Source



        Date/Time Date/Time Type    Type    Clinicians Facility Department ID   

   

 

        2020 Office          Job SHEPPARD     [id number]

[id number] 



        14:23:16 15:20:30 Visit           Armando, AMBULATOR [id number]         



                                        Margo COHEN       [id number]         



                                        Nasrin          [phone]         



                                                        300             







Results







           Test Description Test Time  Test Comments Results    Result     [facility]

e



                                                       Comments   

 

           ECG 12 lead              Interface, External Ris            

[facility] St



                      1                     In - 2019  4:58 PM            

[facility] -



                      16:58:15              CSTVentricular Rate 68            Me

dical



                                            BPMAtrial Rate 68 BPMP-R            

Center



                                            Interval 168 msQRS            



                                            Duration 138 msQ-T            



                                            Interval 476 msQTC            



                                            Calculation(Bazett) 506            



                                            msP Axis 48 degreesR            



                                            Axis 9 degreesT Axis -4            



                                            degreesNormal sinus            



                                            rhythmRight bundle            



                                            branch blockNonspecific            



                                            ST and T wave            



                                            abnormalityProlonged            



                                            QTAbnormal ECGWhen            



                                            compared with ECG of            



                                            2017 19:07,ST            



                                            less depressed now            



                                            inferolateral leadsST            



                                            depression and  T wave            



                                            inversion less evident            



                                            in Anterior            



                                            leadsConfirmed by            



                                            MD KYLEE, USAMA            



                                            ([id number]) on 2019            



                                            4:58:09 PM            









                    RPR                 2019 04:27:00 









                      Test Item  Value      Reference Range Interpretation Comme

nts









             RPR (test code = 27341-7) Nonreactive  Nonreactive               

 

             Lab Interpretation (test code = 82004-6) Normal                    

             



[facility]





             Test Item    Value        Reference Range Interpretation Comments

 

             RPR SCREEN (BEAKER) (test code = Nonreactive  Nonreactive          

     



             420)                                                



POC-Glucose [id number] 16:12:00





             Test Item    Value        Reference Range Interpretation Comments

 

             POC-Glucose Meter (test 278 mg/dL           H            : TE

STED AT [facility]



             code = 1538)                                        [address] [facility], [address]

30:



                                                                 /Techni

robbie



                                                                 ID = [id number] for



                                                                 AKINSONU, CATARINO

 

             Lab Interpretation (test Abnormal                               



             code = 66242-5)                                        



[facility]SE [id number] 16:12:00





             Test Item    Value        Reference Range Interpretation Comments

 

             POC-GLUCOSE METER 278 mg/dL           H            : TESTED A

[facility]



             [facility] ([facility] code =                                        [facility],



             1538)                                               [address]:



                                                                 /Techni

robbie ID



                                                                 = [id number] for [place]

INSONU,



                                                                 CATARINO



POCT-GLUCOSE [id number] 13:34:00





             Test Item    Value        Reference Range Interpretation Comments

 

             POC-GLUCOSE METER 349 mg/dL           H            : TESTED A

[facility]



             (BEAKER) (test code =                                        [facility],



             1538)                                               [address]:



                                                                 /Techni

robbie ID



                                                                 = [id number] for CATARINO GIBSON



POCT-GLUCOSE [id number] 08:38:00





             Test Item    Value        Reference Range Interpretation Comments

 

             POC-GLUCOSE METER 247 mg/dL           H            : TESTED A

T [facility] [address]



             (BEAKER) (test code =                                        [place],



             1538)                                               [address]:



                                                                 /Techni

robbie ID



                                                                 = [id number] for CATARINO GIBSON



Basic Metabolic [id number] 05:38:00





             Test Item    Value        Reference Range Interpretation Comments

 

             Sodium (test code = 138 meq/L    136-145                   



             2951-2)                                             

 

             Potassium (test code = 3.5 meq/L    3.5-5.1                   Speci

men moderately



             2823-3)                                             hemolyzed

 

             Chloride (test code = 108 meq/L           H            



             2075-0)                                             

 

             CO2 (test code = 23 meq/L     22-29                     



             8-9)                                             

 

             BUN (test code = 7 mg/dL      7-21                      



             3094-0)                                             

 

             Creatinine (test code = 0.81 mg/dL   0.57-1.25                 Spec

imen moderately



             2160-0)                                             hemolyzed

 

             Glucose (test code = 291 mg/dL           H            



             2345-7)                                             

 

             Calcium (test code = 8.4 mg/dL    8.4-10.2                  



             38977-8)                                            

 

             EGFR (test code = 70           mL/min/1.73 sq m              ESTIMA

EMILY GFR IS



             33914-3)                                            NOT AS ACCURATE

 AS



                                                                 CREATININE



                                                                 CLEARANCE IN



                                                                 PREDICTING



                                                                 GLOMERULAR



                                                                 FILTRATION RATE

.



                                                                 ESTIMATED GFR I

S



                                                                 NOT APPLICABLE 

FOR



                                                                 DIALYSIS PATIEN

TS.

 

             Lab Interpretation Abnormal                               



             (test code = 79716-3)                                        



[facility] 05:38:00





             Test Item    Value        Reference Range Interpretation Comments

 

             Magnesium (test code = 1.9 mg/dL    1.6-2.6                   Speci

men moderately



             22540-7)                                            hemolyzed

 

             Lab Interpretation (test Normal                                 



             code = 37914-6)                                        



[facility]IUM2019-11-30 05:38:00





             Test Item    Value        Reference Range Interpretation Comments

 

             MAGNESIUM (BEAKER) 1.9 mg/dL    1.6-2.6                   Specimen 

moderately



             (test code = 627)                                        hemolyzed



BASIC METABOLIC [id number] 05:38:00





             Test Item    Value        Reference Range Interpretation Comments

 

             SODIUM (BEAKER) 138 meq/L    136-145                   



             (test code = 381)                                        

 

             POTASSIUM (BEAKER) 3.5 meq/L    3.5-5.1                   Specimen 

moderately



             (test code = 379)                                        hemolyzed

 

             CHLORIDE (BEAKER) 108 meq/L           H            



             (test code = 382)                                        

 

             CO2 (BEAKER) (test 23 meq/L     22-29                     



             code = 355)                                         

 

             BLOOD UREA NITROGEN 7 mg/dL      7-21                      



             (BEAKER) (test code                                        



             = 354)                                              

 

             CREATININE (BEAKER) 0.81 mg/dL   0.57-1.25                 Specimen

 moderately



             (test code = 358)                                        hemolyzed

 

             GLUCOSE RANDOM 291 mg/dL           H            



             (BEAKER) (test code                                        



             = 652)                                              

 

             CALCIUM (BEAKER) 8.4 mg/dL    8.4-10.2                  



             (test code = 697)                                        

 

             EGFR (BEAKER) (test 70 mL/min/1.73                           ESTIMA

EMILY GFR IS



             code = 1092) sq m                                   NOT AS ACCURATE

 AS



                                                                 CREATININE



                                                                 CLEARANCE IN



                                                                 PREDICTING



                                                                 GLOMERULAR



                                                                 FILTRATION RATE

.



                                                                 ESTIMATED GFR I

S



                                                                 NOT APPLICABLE 

FOR



                                                                 DIALYSIS PATIEN

TS.



CBC with platelet count + automated [id number] 05:14:00





             Test Item    Value        Reference Range Interpretation Comments

 

             WBC (test code = 6690-2) 8.1          3.5- 10.5 K/L              

 

             RBC (test code = 789-8) 4.83         3.93- 5.22 M/L              

 

             MCHC (test code = 786-4) 34.5         32.2- 35.5 GM/DL             

 

 

             Hematocrit (test code = 4544-3) 42.3 %       34.1-44.9             

    

 

             MCV (test code = 787-2) 87.6 fL      79.4-94.8                 

 

             MCH (test code = 785-6) 30.2 pg      25.6-32.2                 

 

             RDW (test code = 788-0) 12.0 %       11.7-14.4                 

 

             Platelets (test code = 777-3) 195          150- 450 K/CU MM        

      

 

             MPV (test code = 32655-3) 12.6 fL      9.4-12.3     H            

 

             nRBC (test code = 413) 0            0- 0 /100 WBC              

 

             % Neutros (test code = 429) 53 %                                   

 

             % Lymphs (test code = 430) 34 %                                   

 

             % Monos (test code = 431) 10 %                                   

 

             % Eos (test code = 432) 2 %                                    

 

             % Baso (test code = 437) 1 %                                    

 

             # Neutros (test code = 670) 4.25         1.56- 6.13 K/L          

    

 

             # Lymphs (test code = 414) 2.71         1.18- 3.74 K/L           

   

 

             # Monos (test code = 415) 0.78         0.24- 0.36 K/L H          

  

 

             # Eos (test code = 416) 0.19         0.04- 0.36 K/L              

 

             # Baso (test code = 417) 0.10         0.01- 0.08 K/L H           

 

 

             Immature Granulocytes-Relative 1 %          0-1                    

   



             (test code = 2801)                                        

 

             Lab Interpretation (test code = Abnormal                           

    



             99099-0)                                            



[facility] W/PLT COUNT &amp; AUTO [id number] 
05:14:00





             Test Item    Value        Reference Range Interpretation Comments

 

             WHITE BLOOD CELL COUNT (BEAKER) 8.1 K/ L     3.5-10.5              

    



             (test code = 775)                                        

 

             RED BLOOD CELL COUNT (BEAKER) 4.83 M/ L    3.93-5.22               

  



             (test code = 761)                                        

 

             HEMOGLOBIN (BEAKER) (test code = 14.6 GM/DL   11.2-15.7            

     



             410)                                                

 

             HEMATOCRIT (BEAKER) (test code = 42.3 %       34.1-44.9            

     



             411)                                                

 

             MEAN CORPUSCULAR VOLUME (BEAKER) 87.6 fL      79.4-94.8            

     



             (test code = 753)                                        

 

             MEAN CORPUSCULAR HEMOGLOBIN 30.2 pg      25.6-32.2                 



             (BEAKER) (test code = 751)                                        

 

             MEAN CORPUSCULAR HEMOGLOBIN CONC 34.5 GM/DL   32.2-35.5            

     



             (BEAKER) (test code = 752)                                        

 

             RED CELL DISTRIBUTION WIDTH 12.0 %       11.7-14.4                 



             (BEAKER) (test code = 412)                                        

 

             PLATELET COUNT (BEAKER) (test 195 K/CU MM  150-450                 

  



             code = 756)                                         

 

             MEAN PLATELET VOLUME (BEAKER) 12.6 fL      9.4-12.3     H          

  



             (test code = 754)                                        

 

             NUCLEATED RED BLOOD CELLS 0 /100 WBC   0-0                       



             (BEAKER) (test code = 413)                                        

 

             NEUTROPHILS RELATIVE PERCENT 53 %                                  

 



             (BEAKER) (test code = 429)                                        

 

             LYMPHOCYTES RELATIVE PERCENT 34 %                                  

 



             (BEAKER) (test code = 430)                                        

 

             MONOCYTES RELATIVE PERCENT 10 %                                   



             (BEAKER) (test code = 431)                                        

 

             EOSINOPHILS RELATIVE PERCENT 2 %                                   

 



             (BEAKER) (test code = 432)                                        

 

             BASOPHILS RELATIVE PERCENT 1 %                                    



             (BEAKER) (test code = 437)                                        

 

             NEUTROPHILS ABSOLUTE COUNT 4.25 K/ L    1.56-6.13                 



             (BEAKER) (test code = 670)                                        

 

             LYMPHOCYTES ABSOLUTE COUNT 2.71 K/ L    1.18-3.74                 



             (BEAKER) (test code = 414)                                        

 

             MONOCYTES ABSOLUTE COUNT (BEAKER) 0.78 K/ L    0.24-0.36    H      

      



             (test code = 415)                                        

 

             EOSINOPHILS ABSOLUTE COUNT 0.19 K/ L    0.04-0.36                 



             (BEAKER) (test code = 416)                                        

 

             BASOPHILS ABSOLUTE COUNT (BEAKER) 0.10 K/ L    0.01-0.08    H      

      



             (test code = 417)                                        

 

             IMMATURE GRANULOCYTES-RELATIVE 1 %          0-1                    

   



             PERCENT (BEAKER) (test code =                                      

  



             2801)                                               



POCT-GLUCOSE [id number] 23:24:00





             Test Item    Value        Reference Range Interpretation Comments

 

             POC-GLUCOSE METER 296 mg/dL           H            : Notified

 RN/MD:



             (MARY) (test code =                                        TESTED

 AT [place] [address]



             153)                                               [facility],



                                                                 [address]:



                                                                 /Techni

robbie ID



                                                                 = [id number] for Ub

Jose wick



POCT-GLUCOSE [id number] 20:42:00





             Test Item    Value        Reference Range Interpretation Comments

 

             POC-GLUCOSE METER 327 mg/dL           H            : Notified

 RN/MD:



             (MARY) (test code =                                        TESTED

 AT [facility] [address]



             153)                                               [facility],



                                                                 [address]:



                                                                 /Techni

robbie ID



                                                                 = [id number] for Melinda Quintero



CT, CTANGIO  [id number] 15:43:00FINAL REPORT PATIENT ID:   [id number] 
CLINICAL HISTORY: Stroke, follow up TECHNIQUE: Initially, noncontrast head CT 
images were performed. Contiguous contrast-enhanced axial images through the 
neck followed by axial images through the head with coronal and sagittal 
reformations to assess the arterial circulation. 3-D reconstructions were 
performed using a volume rendered technique separately on a workstation.  This 
exam was performed according to the departmental dose optimization program which
 includes automated exposure control, adjustment of the mA and/or kV according 
to the patient size, and/or use of an iterative reconstruction technique. 
Stenosis evaluation reported in compliance with NASCET criteria. COMPARISON: MRI
 brain 2019, CTA head and neck 2017 FINDINGS: CTA head:Multifocal left
 border zone acute infarcts. Remote left cerebellar hemisphere infarct. No acute
 intracranial hemorrhage. There is no hydrocephalus or midline shift. The skull 
is intact.  There is atherosclerosis inthe cavernous ICAs with a short segment 
severe focal stenosis of the right ICA at the petrous/cavernous junction. 
Moderate focal stenosis of the left communicating ICA. Bilateral MCA and LYLA 
branches are patent. Posterior circulation is unremarkable. The major intradural
 venous sinuses are patent.  CTA neck:Great vessel origins: No occlusion or 
high-grade stenosis. Carotid arteries: No occlusion or high-grade stenosis.  
Vertebral arteries: No occlusion or high-grade stenosis. Mild degenerative 
changes of the cervical spine. Multinodular thyroid; largest discrete nodule 
measures up to 2 cm and should be further evaluated by ultrasound. Mild scarring
 of the visualized lung apices.    IMPRESSION:1.Multifocal infarcts in the left 
cerebral hemisphere, without hemorrhage.2.Short segment severe focal stenosis of
 the right ICA at the petrous/cavernous junction. Moderate focal stenosis of the
 left communicating ICA. No intracranial arterial occlusion.3.No significant 
cervical arterial stenosis. Signed: Lalo Gray Verified Date/Time: 
 2019 15:43:37   Electronically signed by: LALO GRAY MD on 
2019 03:43 PMCT, CAROTID, [id number] 15:43:00FINAL REPORT PATIENT ID:
   [id number] CLINICAL HISTORY: Stroke, follow up TECHNIQUE: Initially, noncont
rast head CT images were performed. Contiguous contrast-enhanced axial images 
through the neck followed by axial images through the head with coronal and 
sagittal reformations to assess the arterial circulation. 3-D reconstructions 
were performed using a volume rendered technique separately on a workstation.  
This exam was performed according to the departmental dose optimization program 
which includes automated exposure control, adjustment of the mA and/or kV 
according to the patient size, and/or use of an iterative reconstruction 
technique. Stenosis evaluation reported in compliance with NASCET criteria. 
COMPARISON: MRI brain 2019, CTA head and neck 2017 FINDINGS: CTA 
head:Multifocal left border zone acute infarcts. Remote left cerebellar 
hemisphere infarct. No acute intracranial hemorrhage. There is no hydrocephalus 
or midline shift. The skull is intact.  There is atherosclerosis inthe cavernous
 ICAs with a short segment severe focal stenosis of the right ICA at the 
petrous/cavernous junction. Moderate focal stenosis of the left communicating 
ICA. Bilateral MCA and LYLA branches are patent. Posterior circulation is 
unremarkable. The major intradural venous sinuses are patent.  CTA neck:Great 
vessel origins: No occlusion or high-grade stenosis. Carotid arteries: No 
occlusion or high-grade stenosis.  Vertebral arteries: No occlusion or high-
grade stenosis. Mild degenerative changes of the cervical spine. Multinodular 
thyroid; largest discrete nodule measures up to 2 cm and should be further 
evaluated by ultrasound. Mild scarring of the visualized lung apices.    
IMPRESSION:1.Multifocal infarcts in the left cerebral hemisphere, without 
hemorrhage.2.Short segment severe focal stenosis of the right ICA at the 
petrous/cavernous junction. Moderate focal stenosis of the left communicating 
ICA. No intracranial arterial occlusion.3.No significant cervical arterial 
stenosis. Signed: Lalo Gray Verified Date/Time:  2019 
15:43:37   Electronically signed by: LALO GRAY MD on 2019 
03:43 [id number] 15:43:00Interface, External Ris In - 2019  
3:45 PM CSTFINAL REPORT PATIENT ID:   [id number] CLINICAL HISTORY: Stroke, follow 
up TECHNIQUE: Initially, noncontrast head CT images were performed. Contiguous 
contrast-enhanced axial images through the neck followed by axial images through
 the head with coronaland sagittal reformations to assess the arterial 
circulation. 3-D reconstructions were performed using a volume rendered 
technique separately on a workstation.  This exam was performed according to the
departmental dose optimization program which includes automated exposure 
control, adjustment of the mA and/or kV according to the patient size, and/or 
use of an iterative reconstruction technique. Stenosis evaluation reported in 
compliance with NASCET criteria. COMPARISON: MRI brain 2019, CTA head and 
neck 2017 FINDINGS: CTA head:Multifocal left border zone acute infarcts. 
Remote left cerebellar hemisphere infarct. No acute intracranial hemorrhage. 
There is no hydrocephalus or midline shift. The skull is intact.  There is 
atherosclerosis in the cavernous ICAs with a short segment severe focal stenosis
 of the right ICA at the petrous/cavernous junction. Moderate focal stenosis of 
the leftcommunicating ICA. Bilateral MCA and LYLA branches are patent. Posterior 
circulation is unremarkable.The major intradural venous sinuses are patent.  CTA
 neck:Great vessel origins: No occlusion or high-grade stenosis. Carotid 
arteries: No occlusion or high-grade stenosis.  Vertebral arteries: No occlusion
 or high-grade stenosis. Mild degenerative changes of the cervical spine. 
Multinodular thyroid; largest discrete nodule measures up to 2 cm and should be 
further evaluated by ultrasound. Mild scarring of the visualized lung apices.   
 IMPRESSION:1.Multifocal infarcts in the left cerebral hemisphere, without 
hemorrhage.2.Short segment severe focal stenosis of the right ICA at the 
petrous/cavernous junction. Moderate focal stenosis of the left communicating 
ICA. No intracranial arterial occlusion.3.No significant cervical arterial 
stenosis. Signed: Lalo Gray Verified Date/Time:  2019 
15:43:37   Electronically signed by: ALLO GRAY MD on 2019 
03:43 [facility] [id number] 15:43:00Interface, 
External Ris In - 2019  3:45 PM CSTFINAL REPORT PATIENT ID:   [id number] 
CLINICAL HISTORY: Stroke, follow up TECHNIQUE: Initially, noncontrast head CT 
images were performed. Contiguous contrast-enhanced axial images through the 
neck followed by axial images through the head with coronaland sagittal 
reformations to assess the arterial circulation. 3-D reconstructions were 
performed using a volume rendered technique separately on a workstation.  This 
exam was performed according to thedepartmental dose optimization program which 
includes automated exposure control, adjustment of the mA and/or kV according to
 the patient size, and/or use of an iterative reconstruction technique. Stenosis
 evaluation reported in compliance with NASCET criteria. COMPARISON: MRI brain 
2019, CTA head and neck 2017 FINDINGS: CTA head:Multifocal left border
 zone acute infarcts. Remote left cerebellar hemisphere infarct. No acute 
intracranial hemorrhage. There is no hydrocephalus or midline shift. The skull 
is intact.  There is atherosclerosis in the cavernous ICAs with a short segment 
severe focal stenosis of the right ICA at the petrous/cavernous junction. 
Moderate focal stenosis of the leftcommunicating ICA. Bilateral MCA and LYLA 
branches are patent. Posterior circulation is unremarkable.The major intradural 
venous sinuses are patent.  CTA neck:Great vessel origins: No occlusion or high-
grade stenosis. Carotid arteries: No occlusion or high-grade stenosis.  
Vertebral arteries: No occlusion or high-grade stenosis. Mild degenerative 
changes of the cervical spine. Multinodular thyroid; largest discrete nodule 
measures up to 2 cm and should be further evaluated by ultrasound. Mild scarring
 of the visualized lung apices.    IMPRESSION:1.Multifocal infarcts in the left 
cerebral hemisphere, without hemorrhage.2.Short segment severe focal stenosis of
 the right ICA at the petrous/cavernous junction. Moderate focal stenosis of the
 left communicating ICA. No intracranial arterial occlusion.3.No significant 
cervical arterial stenosis. Signed: Lalo Gray MDReport Verified Date/Time: 
 2019 15:43:37   Electronically signed by: LALO GRAY MD on 
2019 03:43 [id number] 
15:11:00





             Test Item    Value        Reference Range Interpretation Comments

 

             POC-GLUCOSE METER 223 mg/dL           H            : TESTED A

T [facility] 6720



             (InsideMaps) (test code =                                        SEBAS MACKENZIE [facility],



             1538)                                               [id number]:



                                                                 /Techni

robbie ID



                                                                 = [id number] for Kitty Clifton



MR, BRAIN, [id number] 10:38:00FINAL REPORT PATIENT ID:   [id number] MRI Brain 
with and without contrast Clinical History: Stroke Technique: MRI of the brain 
utilizing axial T1, T2, FLAIR, GRE, DWI, sagittal T1; and postgadolinium axial, 
sagittal, and coronal T1-weighted images. Comparisons: MRI 2016 Findings: 
There is acute infarction in the left cerebral deep white matter. There are 
small acute cortical infarcts in the left parietal lobe. There is no hemorrhage.
 There is a chronic encephalomalacic inferomedial left cerebellar infarct with 
hemosiderin staining.  There is no abnormal intracranial enhancement or mass. 
There is mild periventricular and subcortical white matter T2 hyperintensity, 
which is nonspecific but compatible with chronic microvascular ischemic change. 
There is mild generalized sulcal prominence withouthydrocephalus, midline shift,
 or apparent mass effect. There are no extra-axial fluid collections. The 
craniocervical junction is preserved. The major intracranial flow-voids appear 
patent.  IMPRESSION: Acute infarction in the deep left cerebral white matter and
 involving the left parietal cortex. No acute hemorrhage. Chronic left 
cerebellar infarct with remote hemorrhage. No masslike intracranial enhancement.
 Signed: Tashia Marie MDReport Verified Date/Time:  2019 10:38:33 Reading
 Location: [address] Neuro Reading Room      Electronically signed by: TASHIA MARIE M.D. on 2019 10:38 AMMR brain without &amp; with IV contrast
2019 10:38:00Interface, External Ris In - 2019 10:40 AM CSTFINAL 
REPORT PATIENT ID:   [id number] MRI Brain with and without contrast Clinical 
History: Stroke Technique: MRI of the brain utilizing axial T1, T2, FLAIR, GRE, 
DWI, sagittal T1; and postgadolinium axial, sagittal, and coronal T1-weighted 
images. Comparisons: MRI 2016 Findings: There is acute infarction in the 
left cerebral deep white matter. There are small acute cortical infarcts in the 
left parietal lobe. There is no hemorrhage. There is achronic encephalomalacic 
inferomedial left cerebellar infarct with hemosiderin staining.  There is no 
abnormal intracranial enhancement or mass. There is mild periventricular and 
subcortical white matter T2 hyperintensity, which is nonspecific but compatible 
with chronic microvascular ischemic change.There is mild generalized sulcal 
prominence without hydrocephalus, midline shift, or apparent mass effect. There 
are no extra-axial fluid collections. The craniocervical junction is preserved. 
The major intracranial flow-voids appear patent.  IMPRESSION: Acute infarction 
in the deep left cerebral white matter and involving the left parietal cortex. 
No acute hemorrhage. Chronic left cerebellar infarctwith remote hemorrhage. No 
masslike intracranial enhancement. Signed: Tashia Marie MDRsatnamort Verified 
Date/Time:  2019 10:38:33 Reading Location: [address] Neuro Reading 
Room      Electronically signed by: TASHIA MARIE M.D. on 2019 10:38 AM
[facility] HeartHemoglobin [id number] 09:35:00





             Test Item    Value        Reference Range Interpretation Comments

 

             Hemoglobin A1C (test code = 4548-4) 11.1 %       4.3-6.1      H    

        

 

             Lab Interpretation (test code = Abnormal                           

    



             11610-0)                                            



[facility] HeartHEMOGLOBIN [id number] 09:35:00





             Test Item    Value        Reference Range Interpretation Comments

 

             HEMOGLOBIN A1C (BEAKER) (test code = 11.1 %       4.3-6.1      H   

         



             368)                                                



2D Echo W/O Doppler(No Doppler)2019 09:12:19Ejection FractionSLEH ECHO 
HEARTLAB MKCKSCOTTON CPACSInterface, External Ris In - 2019  9:12 AM C
STTransthoracic Echocardiography Report (TTE) Demographics  Patient Name   
JOSE ORO Date of Study       2019                A  MRN           
 [id number]         Gender              Female  Visit Number   [id number]       
Race                Unknown  Accession      [id number]        Room Number        
 2231 Number  Date of Birth  1950       Referring Physician Riley Minaya MD  Age            69 
year(s)       Sonographer       Emiliano Nguyen  Analyst        Cristina Mendoza Interpreting        Joseph Iqbal MD                               
  Physician Procedure Type of Study     TTE procedure:ECHO2D MODE W/O DOPPLER 
Indications:Stroke .Clinical HistoryBACK PAIN,DM,HTNHeight: 67 inches Weight: 
78.02 kg (172lbs) BSA: 1.9 m^2 BMI: 26.94 kg/m^2HR: 81 bpm BP: 187/80 mmHg 
Summary Limited 2D exam (no Doppler) to address study indication. IV saline 
contrast injection was negative for a PFO (patent foramen ovale) at rest and 
post Valsalva . The left ventricle is chamber size (by PSLAX dimension) is 
normal (female - LVIDd 3.8-5.2cm) . All of the LV segments contract normally . 
Global LV systolic function normal. Estimated LVEF by qualitative assessment is 
normal (&gt;60%) .  Previous Study Compared to the previous study 17 there 
was no significant change.  Signature  --------------------------------------
-------------------------- Electronically signed by Joseph Iqbal MD(Interpreting physician) on 2019 09:12 AM 
----------------------------------------------------------------  Findings  Left
 Ventricle         Limited 2D exam (no Doppler) to address study                
        indication.                       The left ventricle is chamber size (by
 PSLAX                        dimension) is normal (female - LVIDd 3.8-5.2cm) . 
                       Borderline concentric LV hypertrophy.                    
   All of the LV segments contract normally .                        Global LV 
systolic function normal .                        Estimated LVEF by qualitative 
assessment is normal                     (&gt;60%) .  Left Atrium            LA 
size is mildly enlarged (35-41 ml/m2) .Right Ventricle        The right 
ventricular chamber size and systolic                        function are within
 normal limits.  Right Atrium           RA cavity size is normal .  Atrial 
Septum  IV saline contrast injection was negative for a PFO                     
   (patent foramen ovale) at rest and post Valsalva .  Aortic Valve           
Mild AoV cusp thickening.                        AoV cusp mobility is normal .  
Mitral Valve           Mild MV leaflet thickening.   Mild mitral annular 
calcification.  Tricuspid Valve        TV structure is normal.  Pulmonic Valve  
       PV is not well visualized.  Aorta                  Aortic root size 
(SInus of Valsalva diameter) is                        normal .  Pericardium    
        No significant pericardial effusion is visualized.  
IVC/SVC/PA/PV/Pleural  The estimated RA pressure by IVC dynamics 5-10mmHg       
    . Chambers/Structures Left Atrium  LA Volume: 69.33 ml                     
LA Area: 21.56cm^2 LA Vol. Index: 36 ml/m^2  Left Ventricle  LVIDd: 5.16 cm LV 
Septum Diastolic: 1.11 cm LV PW Diastolic: 1.15 cm  LVOT Diameter: 2.04 cm Aorta
  Ao Root S of Laxmi.: 3.3 cm Doppler/Quantitative Measurements LVOT  LVOT 
Diameter: 2.04 cm LVOT Area: 3.27 cm^2CH[facility] CenterPO[facility] 
[id number] 08:08:00





             Test Item    Value        Reference Range Interpretation Comments

 

             POC-GLUCOSE METER 196 mg/dL           H            : TESTED A

T [facility]



             (InsideMaps) (test code =                                        [employer] [facility],



             1538)                                               [address]:



                                                                 /Techni

robbie ID



                                                                 = [id number] for Eric Hernandez



POCT-GLUCOSE [id number] 07:22:00





             Test Item    Value        Reference Range Interpretation Comments

 

             POC-GLUCOSE METER 174 mg/dL           H            : TESTED A

T [facility] 6720



             (InsideMaps) (test code =                                        [employer] [facility],



             1538)                                               [address]:



                                                                 /Techni

robbie ID



                                                                 = [id number] for ALYSSA TAVERA



Vitamin B12 and [id number] 06:37:00





             Test Item    Value        Reference Range Interpretation Comments

 

             Vitamin B12 (test code = 2132-9) 839 pg/mL    213-816      H       

     

 

             Folate (test code = 2284-8) 36.1 ng/mL   >=7.0                     

 

             Lab Interpretation (test code = Abnormal                           

    



             62630-4)                                            



[facility] HeartVITAMIN B12 AND [id number] 06:37:00





             Test Item    Value        Reference Range Interpretation Comments

 

             VITAMIN B12 (BEAKER) (test code = 839 pg/mL    213-816      H      

      



             774)                                                

 

             FOLATE (BEAKER) (test code = 362) 36.1 ng/mL   >=7.0               

      



TSH/Free T4 If [id number] 06:36:00





             Test Item    Value        Reference Range Interpretation Comments

 

             TSH (test code = 38018-0) 1.57         0.35- 4.94 uIU/mL           

   

 

             Lab Interpretation (test code = Normal                             

    



             91320-2)                                            



[facility] HeartTSH/FREE T4 IF [id number] 06:36:00





             Test Item    Value        Reference Range Interpretation Comments

 

             THYROID STIMULATING HORMONE 1.57 uIU/mL  0.35-4.94                 



             (BEAKER) (test code = 772)                                        



RAD, CHEST, 1 VIEW, NON [id number] 05:59:00Reason for exam:-&gt;cvaShould 
this be performed at the bedside?-&gt;YesFINAL REPORT PATIENT ID:   [id number]  
History: CVA. Comparison: 2016 Findings: A single view ofthe chest is 
submitted. The cardiac silhouette is within normal limits for size. There is 
atherosclerotic calcification of the aorta.  The lung volumes are slightly low. 
There is no focal consolidation, pneumothorax, large pleural effusion or 
evidence of overt pulmonary edema.   There is no acute bonyabnormality. Signed: 
Charles Beauleiu MDReport Verified Date/Time:  2019 05:59:01       Electronic
ally signed by: CHARLES BEAULIEU M.D. on 2019 05:59 AMXR chest 1 view 
portable / [id number] 05:59:00Interface, External Ris In - 2019  
6:01 AM CSTFINAL REPORT PATIENT ID:   [id number]  History: CVA. Comparison: 
2016 Findings: A single view of the chest is submitted. The cardiac 
silhouette is within normal limits for size. There is atherosclerotic 
calcification of the aorta.  The lung volumes are slightly low. There is no 
focal consolidation, pneumothorax, large pleural effusion or evidence of overt 
pulmonary edema.   There is no acute bony abnormality. Signed: Charles Beaulieu 
MDReport Verified Date/Time:  2019 05:59:01       Electronically signed 
by: CHARLES BEAULIEU M.D. on 2019 05:59 [facility]-
Reactive [id number] 05:23:00





             Test Item    Value        Reference Range Interpretation Comments

 

             CRP (test code = 676) 1.16 mg/dL   0-0.5        H            

 

             Lab Interpretation (test code = Abnormal                           

    



             90901-8)                                            



[facility] HeartC-REACTIVE [id number] 05:23:00





             Test Item    Value        Reference Range Interpretation Comments

 

             C-REACTIVE PROTEIN (BEAKER) (test 1.16 mg/dL   0.00-0.50    H      

      



             code = 676)                                         



Troponin -55-09 05:16:00





             Test Item    Value        Reference Range Interpretation Comments

 

             Troponin I (test code = <0.01        0-0.03                    



             77929-4)                                            

 

             DAVIAN (test code = DAVIAN) Troponin I (TnI)                           



                          levels must be                           



                          interpreted in the                           



                          context of the                           



                          presenting symptoms                           



                          and the clinical                           



                          findings. Elevated                           



                          TnI levels indicate                           



                          myocardial damage,                           



                          but are not specific                           



                          for ischemic heart                           



                          disease. Elevated TnI                           



                          levels are seen in                           



                          patients with other                           



                          cardiac conditions                           



                          (including                             



                          myocarditis and                           



                          congestive heart                           



                          failure), and slight                           



                          TnI elevations occur                           



                          in patients with                           



                          other conditions,                           



                          including sepsis,                           



                          renal failure,                           



                          acidosis, acute                           



                          neurological disease,                           



                          and persistent                           



                          tachyarrhythmia.                           

 

             Lab Interpretation (test Normal                                 



             code = 69887-6)                                        



[facility] HeartTROPONIN -07-97 05:16:00





             Test Item    Value        Reference Range Interpretation Comments

 

             TROPONIN I (BEAKER) (test code = 397) < ng/mL      0.00-0.03       

          








             Test Item    Value        Reference Range Interpretation Comments

 

             Triglycerides (test 189 mg/dL                              Specimen



             code = 2571-8)                                        slightly



                                                                 hemolyzed

 

             Cholesterol (test 177 mg/dL                              Specimen



             code = 2093-3)                                        slightly



                                                                 hemolyzed

 

             HDL (test code = 42 mg/dL                               



             5-9)                                             

 

             LDL Calculated (test 97 mg/dL                               



             code = 67183-2)                                        

 

             DAVIAN (test code = Triglyceride                           



             DAVIAN)         Reference Range:                           



                          Low Risk                               



                          <150   Borderline                           



                           150-199   High                           



                          Risk     200-499                           



                          Very High Risk                           



                          >=500 Cholesterol                           



                          Reference Range:                           



                          Low Risk                               



                          <200   Borderline                           



                           200-239    High                           



                          Risk        >240                           



                          HDL Cholesterol                           



                          Reference Range:                           



                          Low Risk                               



                          >=60   High Risk                           



                               <40 LDL                           



                          Cholesterol                            



                          Reference Range:                           



                          Optimal                                



                          <100   Near Optimal                           



                           100-129                               



                          Borderline                             



                          130-159   High                           



                              160-189   Very                           



                          High       >=190                           



[facility] METABOLIC [id number] 05:14:00





             Test Item    Value        Reference Range Interpretation Comments

 

             SODIUM (BEAKER) 141 meq/L    136-145                   



             (test code = 381)                                        

 

             POTASSIUM (BEAKER) 3.3 meq/L    3.5-5.1      L            Specimen 

slightly



             (test code = 379)                                        hemolyzed

 

             CHLORIDE (BEAKER) 110 meq/L           H            



             (test code = 382)                                        

 

             CO2 (BEAKER) (test 24 meq/L     22-29                     



             code = 355)                                         

 

             BLOOD UREA NITROGEN 7 mg/dL      7-21                      



             (BEAKER) (test code                                        



             = 354)                                              

 

             CREATININE (BEAKER) 0.71 mg/dL   0.57-1.25                 Specimen

 slightly



             (test code = 358)                                        hemolyzed

 

             GLUCOSE RANDOM 176 mg/dL           H            



             (BEAKER) (test code                                        



             = 652)                                              

 

             CALCIUM (BEAKER) 8.4 mg/dL    8.4-10.2                  



             (test code = 697)                                        

 

             EGFR (BEAKER) (test 82 mL/min/1.73                           ESTIMA

EMILY GFR IS



             code = 1092) sq m                                   NOT AS ACCURATE

 AS



                                                                 CREATININE



                                                                 CLEARANCE IN



                                                                 PREDICTING



                                                                 GLOMERULAR



                                                                 FILTRATION RATE

.



                                                                 ESTIMATED GFR I

S



                                                                 NOT APPLICABLE 

FOR



                                                                 DIALYSIS PATIEN

TS.



LIPID [id number] 05:14:00





             Test Item    Value        Reference Range Interpretation Comments

 

             TRIGLYCERIDES (BEAKER) 189 mg/dL                              Speci

men slightly



             (test code = 540)                                        hemolyzed

 

             CHOLESTEROL (BEAKER) 177 mg/dL                              Specime

n slightly



             (test code = 631)                                        hemolyzed

 

             HDL CHOLESTEROL (BEAKER) 42 mg/dL                               



             (test code = 976)                                        

 

             LDL CHOLESTEROL 97 mg/dL                               



             CALCULATED (BEAKER)                                        



             (test code = 633)                                        



Triglyceride Reference Range:   Low Risk         &lt;150   Borderline    150-199
   High Risk     200-499   Very High Risk  &gt;=500Cholesterol Reference Range: 
  Low Risk         &lt;200   Borderline 200-239    High Risk        &gt;240HDL 
Cholesterol Reference Range:   Low Risk         &gt;=60   High Risk         
&lt;40LDL Cholesterol Reference Range:   Optimal          &lt;100   Near Optimal
  100-129   Borderline    130-159   High          160-189   Very High       
&gt;=190CBC W/PLT COUNT &amp; AUTO [id number] 04:54:00





             Test Item    Value        Reference Range Interpretation Comments

 

             WHITE BLOOD CELL COUNT (BEAKER) 9.1 K/ L     3.5-10.5              

    



             (test code = 775)                                        

 

             RED BLOOD CELL COUNT (BEAKER) 4.88 M/ L    3.93-5.22               

  



             (test code = 761)                                        

 

             HEMOGLOBIN (BEAKER) (test code = 15.0 GM/DL   11.2-15.7            

     



             410)                                                

 

             HEMATOCRIT (BEAKER) (test code = 43.3 %       34.1-44.9            

     



             411)                                                

 

             MEAN CORPUSCULAR VOLUME (BEAKER) 88.7 fL      79.4-94.8            

     



             (test code = 753)                                        

 

             MEAN CORPUSCULAR HEMOGLOBIN 30.7 pg      25.6-32.2                 



             (BEAKER) (test code = 751)                                        

 

             MEAN CORPUSCULAR HEMOGLOBIN CONC 34.6 GM/DL   32.2-35.5            

     



             (BEAKER) (test code = 752)                                        

 

             RED CELL DISTRIBUTION WIDTH 12.0 %       11.7-14.4                 



             (BEAKER) (test code = 412)                                        

 

             PLATELET COUNT (BEAKER) (test 198 K/CU MM  150-450                 

  



             code = 756)                                         

 

             MEAN PLATELET VOLUME (BEAKER) 12.7 fL      9.4-12.3     H          

  



             (test code = 754)                                        

 

             NUCLEATED RED BLOOD CELLS 0 /100 WBC   0-0                       



             (BEAKER) (test code = 413)                                        

 

             NEUTROPHILS RELATIVE PERCENT 60 %                                  

 



             (BEAKER) (test code = 429)                                        

 

             LYMPHOCYTES RELATIVE PERCENT 30 %                                  

 



             (BEAKER) (test code = 430)                                        

 

             MONOCYTES RELATIVE PERCENT 7 %                                    



             (BEAKER) (test code = 431)                                        

 

             EOSINOPHILS RELATIVE PERCENT 3 %                                   

 



             (BEAKER) (test code = 432)                                        

 

             BASOPHILS RELATIVE PERCENT 1 %                                    



             (BEAKER) (test code = 437)                                        

 

             NEUTROPHILS ABSOLUTE COUNT 5.39 K/ L    1.56-6.13                 



             (BEAKER) (test code = 670)                                        

 

             LYMPHOCYTES ABSOLUTE COUNT 2.72 K/ L    1.18-3.74                 



             (BEAKER) (test code = 414)                                        

 

             MONOCYTES ABSOLUTE COUNT (BEAKER) 0.63 K/ L    0.24-0.36    H      

      



             (test code = 415)                                        

 

             EOSINOPHILS ABSOLUTE COUNT 0.23 K/ L    0.04-0.36                 



             (BEAKER) (test code = 416)                                        

 

             BASOPHILS ABSOLUTE COUNT (BEAKER) 0.06 K/ L    0.01-0.08           

      



             (test code = 417)                                        

 

             IMMATURE GRANULOCYTES-RELATIVE 0 %          0-1                    

   



             PERCENT (BEAKER) (test code =                                      

  



             2801)                                               



Prothrombin time/[id number] 04:20:00





             Test Item    Value        Reference Range Interpretation Comments

 

             Protime (test code = 14.1         11.9- 14.2                



             5902-2)                   seconds                   

 

             INR (test code = 1.2          <=5.9                     



             6301-6)                                             

 

             DAVIAN (test code = DAVIAN) Effective 2019:                         

  



                          PT Reference Range                           



                          ChangeNew: 11.9-14.2                           



                          Previous: 11.7-14.7                           



                          RECOMMENDED                            



                          COUMADIN/WARFARIN INR                           



                          THERAPY RANGESSTANDARD                           



                          DOSE: 2.0-3.0                           



                          Includes: PROPHYLAXIS                           



                          for venous thrombosis,                           



                          systemic embolization;                           



                          TREATMENT for venous                           



                          thrombosis and/or                           



                          pulmonary embolus.HIGH                           



                          RISK: Target INR is                           



                          2.5-3.5 for patients                           



                          wiht mechanical heart                           



                          valves.                                

 

             Lab Interpretation Normal                                 



             (test code = 68593-9)                                        



[facility] HeartPROTHROM[facility]/[id number] 04:20:00





             Test Item    Value        Reference Range Interpretation Comments

 

             PROTIME (BEAKER) (test code = 14.1 seconds 11.9-14.2               

  



             759)                                                

 

             INR (BEAKER) (test code = 370) 1.2          <=5.9                  

   



Effective 2019: PT Reference Range ChangeNew: 11.9-14.2  Previous: 11.7-
14.7RECOMMENDED COUMADIN/WARFARIN INR THERAPY RANGESSTANDARD DOSE: 2.0-3.0  
Includes: PROPHYLAXIS for venous thrombosis, systemic embolization; TREATMENT 
for venous thrombosis and/or pulmonary embolus.HIGH RISK: Target INR is2.5-3.5 
for patients wiht mechanical heart valves.

## 2020-06-09 NOTE — ER
Nurse's Notes                                                                                     

 [facility]                                                                 

Name: Sue Desai                                                                             

Age: 69 yrs                                                                                       

Sex: Female                                                                                       

: 1950                                                                                   

MRN: [id number]                                                                                   

Arrival Date: 2020                                                                          

Time: 15:38                                                                                       

Account#: [id number]                                                                            

Bed 26                                                                                            

Private MD:                                                                                       

Diagnosis: Unspecified abdominal pain;Urinary tract infection, site not specified                 

                                                                                                  

Presentation:                                                                                     

                                                                                             

15:46 Chief complaint: Patient states: my stomach starting hurting about the , i  tw2 

      had jeans on and thought they were cutting into me but after i took the jeans off it        

      still felt that way, i am bloated, i hurt side to side, the pain was worse  night     

      and my sister said i was going to go somewhere, and my doctor isnt seeing any pts right     

      now, the pain is a constant pressure and it is numb and i need to get checked out since     

      my doctor cant see me right now. Coronavirus screen: Patient denies a cough. Patient        

      denies shortness of breath or difficulty breathing. Patient denies measured and/or          

      subjective temperature greater than 100.4F prior to today's visit. Patient denies           

      travel on a cruise ship or to a country the [facility] currently lists as an affected area.        

      Patient denies contact with known and/or suspected case of COVID-19. Ebola Screen:          

      Patient denies travel to an Ebola-affected area in the 21 days before illness onset.        

      Initial Sepsis Screen: Does the patient meet any 2 criteria? No. Patient's initial          

      sepsis screen is negative. Does the patient have a suspected source of infection? No.       

      Patient's initial sepsis screen is negative. Risk Assessment: Do you want to hurt           

      yourself or someone else? Patient reports no desire to harm self or others. Onset of        

      symptoms was 2020.                                                                 

15:46 Method Of Arrival: Ambulatory                                                           tw2 

15:46 Acuity: GERI 3                                                                           tw2 

                                                                                                  

Triage Assessment:                                                                                

15:49 General: Appears in no apparent distress. obese, well groomed, Behavior is calm,        tw2 

      cooperative, appropriate for age. Pain: Complains of pain in abdomen. GI: Reports lower     

      abdominal pain, upper abdominal pain.                                                       

                                                                                                  

Historical:                                                                                       

- Allergies:                                                                                      

15:50 Codeine;                                                                                tw2 

- Home Meds:                                                                                      

15:50 Orient Thyroid 60 mg Oral tab daily [Active]; aspirin 81 mg Oral chew 1 tab once daily  tw2 

      [Active]; atenolol 100 mg Oral tab 1 tab once daily [Active]; clonidine HCl 0.2 mg Oral     

      tab 1 tab 3 times per day [Active]; diltiazem HCl 120 mg Oral CDER 1 cap once daily         

      [Active]; gabapentin 300 mg Oral cap 1 cap 3 times per day [Active]; Prevacid 15 mg         

      Oral cpDR 1 cap once daily [Active]; lisinopril 40 mg Oral tab 1 tab once daily             

      [Active]; potassium chloride 10 mEq Oral cpER 1 cap once daily [Active]; glimepiride 4      

      mg Oral tab 1 tab once daily [Active]; furosemide 40 mg Oral tab 1 tab 2 times per day      

      [Active];                                                                                   

- PMHx:                                                                                           

15:50 Diabetes - NIDDM; Hypertension; Back pain; CVA; 2019;                               tw2 

- PSHx:                                                                                           

15:50 Hysterectomy;                                                                           tw 

15:51 Cholecystectomy;                                                                        tw 

                                                                                                  

- Immunization history:: Adult Immunizations.                                                     

- Social history:: Smoking status: .                                                              

                                                                                                  

                                                                                                  

Screenin:00 Abuse screen: Denies threats or abuse. Denies injuries from another. Nutritional        wh  

      screening: No deficits noted. Tuberculosis screening: No symptoms or risk factors           

      identified. Fall Risk None identified.                                                      

                                                                                                  

Assessment:                                                                                       

19:00 General: Appears in no apparent distress. Behavior is calm, cooperative, appropriate    wh  

      for age. Pain: Complains of pain in abdomen Pain does not radiate. Quality of pain is       

      described as aching. Neuro: Level of Consciousness is awake, alert, obeys commands,         

      Oriented to person, place, time, situation, Appropriate for age. Cardiovascular: Heart      

      tones S1 S2. Respiratory: Airway is patent Respiratory effort is even, unlabored,           

      Respiratory pattern is regular, symmetrical, Breath sounds are clear bilaterally. GI:       

      Abdomen is flat, non-distended, Bowel sounds present X 4 quads. Abd is soft and non         

      tender X 4 quads. Reports lower abdominal pain. : No signs and/or symptoms were           

      reported regarding the genitourinary system. EENT: No signs and/or symptoms were            

      reported regarding the EENT system. Derm: Skin is intact, is healthy with good turgor,      

      Skin is pink, warm \T\ dry. normal. Musculoskeletal: Circulation, motion, and sensation     

      intact.                                                                                     

                                                                                                  

Vital Signs:                                                                                      

15:46  / 58; Pulse 55; Resp 17; Temp 99.1(TE); Pulse Ox 100% on R/A; Weight 81.65 kg    tw2 

      (R); Height 5 ft. 7 in. (170.18 cm); Pain 3/10;                                             

16:48  / 63; Resp 20; Pulse Ox 100% ;                                                   da2 

19:15  / 105; Pulse 60; Resp 18; Pulse Ox 99% on R/A;                                   wh  

15:46 Body Mass Index 28.19 (81.65 kg, 170.18 cm)                                             tw2 

                                                                                                  

ED Course:                                                                                        

15:38 Patient arrived in ED.                                                                  mr  

15:48 Triage completed.                                                                       tw2 

15:48 Arm band placed on.                                                                     tw2 

16:28 Kory Verduzco NP is PHCP.                                                           pm1 

16:28 Byron Mcguire MD is Attending Physician.                                              pm1 

17:18 Lela Escoto, RN is Primary Nurse.                                                  dm5 

18:06 CT Abd/Pelvis - IV Contrast Only In Process Unspecified.                                EDMS

19:00 Patient has correct armband on for positive identification. Placed in gown. Bed in low  wh  

      position. Call light in reach. Side rails up X 1. Pulse ox on. NIBP on.                     

19:29 No provider procedures requiring assistance completed. IV discontinued, intact,         wh  

      bleeding controlled, No redness/swelling at site.                                           

                                                                                                  

Administered Medications:                                                                         

19:09 Not Given (Physician Discretion): Rocephin 1 grams IV at calculated rate once; Given    wh  

      slow IV push per pharmacy instructions                                                      

19:26 Drug: Rocephin (cefTRIAXone) 1 grams Route: IM; Site: right gluteus;                      

19:36 Follow up: Response: No adverse reaction                                                  

                                                                                                  

                                                                                                  

Outcome:                                                                                          

18:51 Discharge ordered by MD.                                                                pm1 

19:29 Discharged to home ambulatory.                                                            

19:29 Condition: stable                                                                           

19:29 Discharge instructions given to patient, Instructed on discharge instructions, follow       

      up and referral plans. medication usage, POC Demonstrated understanding of                  

      instructions, follow-up care, medications, POC Prescriptions given X 1.                     

19:36 Patient left the ED.                                                                      

                                                                                                  

Signatures:                                                                                       

Dispatcher [employer]                           EDMS                                                 

Lela Escoto, RN                    RN   dm5                                                  

Lanie Yarbrough                                 mr                                                   

DaxKory, QUINTON                    NP   pm1                                                  

Alana Caldwell RN                          RN   tw2                                                  

Heather Arellano                                                                                   

Guillermo Solomon RN RN   da2                                                  

                                                                                                  

**************************************************************************************************

## 2020-06-09 NOTE — EDPHYS
Physician Documentation                                                                           

 [facility]                                                                 

Name: Sue Desai                                                                             

Age: 69 yrs                                                                                       

Sex: Female                                                                                       

: 1950                                                                                   

MRN: [id number]                                                                                   

Arrival Date: 2020                                                                          

Time: 15:38                                                                                       

Account#: [id number]                                                                            

Bed 26                                                                                            

Private MD:                                                                                       

ED Physician Byron Mcguire                                                                       

HPI:                                                                                              

                                                                                             

16:48 This 69 yrs old  Female presents to ER via Ambulatory with complaints of       pm1 

      Abdominal Pain.                                                                             

16:48 The patient presents with abdominal pain in the lower abdomen. Onset: The               pm1 

      symptoms/episode began/occurred 2 month(s) ago. The symptoms do not radiate. Associated     

      signs and symptoms: Pertinent negatives: nausea, vomiting, and diarrhea, chest pain,        

      constipation, dysuria, fever, shortness of breath. The symptoms are described as            

      tightness to lower abdomen, " like pants on to tight". Modifying factors: The symptoms      

      are alleviated by nothing, the symptoms are aggravated by nothing. The patient has not      

      recently seen a physician, due to covid. Patient reports onset of abdominal pain after      

      she was reaching for her cat on . Patient reports history of chronic back         

      pain and has numbness sensation to bilateral anterior hip area due to back pain. After      

      reaching out for her cat she noticed that the pain started to migrate towards her           

      suprapubic area. Patient without any incontinence of bowel or bladder and is able to        

      ambulate.                                                                                   

                                                                                                  

Historical:                                                                                       

- Allergies:                                                                                      

15:50 Codeine;                                                                                tw2 

- Home Meds:                                                                                      

15:50 [place] Thyroid 60 mg Oral tab daily [Active]; aspirin 81 mg Oral chew 1 tab once daily  tw2 

      [Active]; atenolol 100 mg Oral tab 1 tab once daily [Active]; clonidine HCl 0.2 mg Oral     

      tab 1 tab 3 times per day [Active]; diltiazem HCl 120 mg Oral CDER 1 cap once daily         

      [Active]; gabapentin 300 mg Oral cap 1 cap 3 times per day [Active]; Prevacid 15 mg         

      Oral cpDR 1 cap once daily [Active]; lisinopril 40 mg Oral tab 1 tab once daily             

      [Active]; potassium chloride 10 mEq Oral cpER 1 cap once daily [Active]; glimepiride 4      

      mg Oral tab 1 tab once daily [Active]; furosemide 40 mg Oral tab 1 tab 2 times per day      

      [Active];                                                                                   

- PMHx:                                                                                           

15:50 Diabetes - NIDDM; Hypertension; Back pain; CVA; 2019;                               tw2 

- PSHx:                                                                                           

15:50 Hysterectomy;                                                                           tw2 

15:51 Cholecystectomy;                                                                        tw2 

                                                                                                  

- Immunization history:: Adult Immunizations.                                                     

- Social history:: Smoking status: .                                                              

                                                                                                  

                                                                                                  

ROS:                                                                                              

16:48 Constitutional: Negative for fever, chills, and weight loss, Neck: Negative for injury, pm1 

      pain, and swelling, Cardiovascular: Negative for chest pain, palpitations, and edema,       

      Respiratory: Negative for shortness of breath, cough, wheezing, and pleuritic chest         

      pain.                                                                                       

16:48 : Negative for injury, bleeding, discharge, and swelling, MS/Extremity: Negative for      

      injury and deformity, Skin: Negative for injury, rash, and discoloration, Neuro:            

      Negative for headache, weakness, numbness, tingling, and seizure.                           

16:48 Abdomen/GI: Positive for abdominal pain, of the suprapubic area, Negative for nausea,       

      vomiting, and diarrhea.                                                                     

16:48 Back: Positive for of the lumbar area, chronic pain.                                        

                                                                                                  

Exam:                                                                                             

16:48 Constitutional:  This is a well developed, well nourished patient who is awake, alert,  pm1 

      and in no acute distress. Head/Face:  Normocephalic, atraumatic. Neck:  Trachea             

      midline, no thyromegaly or masses palpated, and no cervical lymphadenopathy.  Supple,       

      full range of motion without nuchal rigidity, or vertebral point tenderness.  No            

      Meningismus. Chest/axilla:  Normal chest wall appearance and motion.  Nontender with no     

      deformity.  No lesions are appreciated.                                                     

16:48 Back:  No spinal tenderness.  No costovertebral tenderness.  Full range of motion.          

      Skin:  Warm, dry with normal turgor.  Normal color with no rashes, no lesions, and no       

      evidence of cellulitis. MS/ Extremity:  Pulses equal, no cyanosis.  Neurovascular           

      intact.  Full, normal range of motion.                                                      

16:48 Cardiovascular: Exam negative for  acute changes, Rate: normal, Rhythm: regular,            

      Pulses: no pulse deficits are appreciated.                                                  

16:48 Respiratory: Exam negative for  acute changes, respiratory distress, shortness of           

      breath.                                                                                     

16:48 Abdomen/GI: Inspection: abdomen appears normal, Palpation: soft, in all quadrants, mild     

      abdominal tenderness, in the suprapubic area, mass, is not appreciated, rebound             

      tenderness, is not appreciated.                                                             

16:48 Neuro: Exam negative for acute changes, Orientation: is normal, Motor: is normal, moves     

      all fours, Sensation: is normal, no obvious gross deficits.                                 

                                                                                                  

Vital Signs:                                                                                      

15:46  / 58; Pulse 55; Resp 17; Temp 99.1(TE); Pulse Ox 100% on R/A; Weight 81.65 kg    tw2 

      (R); Height 5 ft. 7 in. (170.18 cm); Pain 3/10;                                             

16:48  / 63; Resp 20; Pulse Ox 100% ;                                                   da2 

19:15  / 105; Pulse 60; Resp 18; Pulse Ox 99% on R/A;                                   wh  

15:46 Body Mass Index 28.19 (81.65 kg, 170.18 cm)                                             tw2 

                                                                                                  

MDM:                                                                                              

16:28 Patient medically screened.                                                             pm1 

18:50 Data reviewed: vital signs. Data interpreted: Pulse oximetry: on room air is 100 %.     pm1 

      Interpretation: normal. Counseling: I had a detailed discussion with the patient and/or     

      guardian regarding: the historical points, exam findings, and any diagnostic results        

      supporting the discharge/admit diagnosis, lab results, radiology results, the need for      

      outpatient follow up, to return to the emergency department if symptoms worsen or           

      persist or if there are any questions or concerns that arise at home.                       

                                                                                                  

                                                                                             

16:36 Order name: Urine Dipstick--Ancillary (enter results)                                   bd  

                                                                                             

16:38 Order name: Basic Metabolic Panel; Complete Time: 18:29                                 pm1 

                                                                                             

16:38 Order name: CBC with Diff; Complete Time: 17:33                                         pm1 

                                                                                             

16:38 Order name: Hepatic Function; Complete Time: 18:29                                      pm1 

                                                                                             

16:38 Order name: Lipase; Complete Time: 18:29                                                pm1 

                                                                                             

16:38 Order name: Urine Microscopic Only; Complete Time: 18:29                                pm1 

                                                                                             

16:38 Order name: IV Saline Lock; Complete Time: 17:18                                        pm1 

                                                                                             

16:38 Order name: Labs collected and sent; Complete Time: 17:18                               pm1 

                                                                                             

16:38 Order name: CT Abd/Pelvis - IV Contrast Only; Complete Time: 18:44                      pm1 

                                                                                             

18:04 Order name: Urine Culture                                                               EDMS

                                                                                                  

Administered Medications:                                                                         

19:09 Not Given (Physician Discretion): Rocephin 1 grams IV at calculated rate once; Given    wh  

      slow IV push per pharmacy instructions                                                      

19:26 Drug: Rocephin (cefTRIAXone) 1 grams Route: IM; Site: right gluteus;                    wh  

19:36 Follow up: Response: No adverse reaction                                                  

                                                                                                  

                                                                                                  

Disposition:                                                                                      

06/10                                                                                             

13:13 Co-signature as Attending Physician, Byron Mcguire MD I agree with the assessment and   kdr 

      plan of care.                                                                               

                                                                                                  

Disposition:                                                                                      

20 18:51 Discharged to Home. Impression: Unspecified abdominal pain, Urinary tract          

  infection, site not specified.                                                                  

- Condition is Stable.                                                                            

- Discharge Instructions: Abdominal Pain, Adult, Urinary Tract Infection, Adult.                  

- Prescriptions for Macrobid 100 mg Oral Capsule - take 1 capsule by ORAL route every             

  12 hours for 10 days; 20 capsule.                                                               

- Medication Reconciliation Form, Thank You Letter, Antibiotic Education, Prescription            

  Opioid Use form.                                                                                

- Follow up: Emergency Department; When: As needed; Reason: Worsening of condition.               

  Follow up: Private Physician; When: 2 - 3 days; Reason: Recheck today's complaints,             

  Continuance of care, Re-evaluation by your physician.                                           

- Problem is new.                                                                                 

- Symptoms have improved.                                                                         

                                                                                                  

                                                                                                  

                                                                                                  

Signatures:                                                                                       

Dispatcher [employer]                           EDMS                                                 

Byron Mcguire MD MD   [facility]                                                  

Kory Verduzco NP                    NP   pm1                                                  

Alana Caldwell RN                          RN   tw2                                                  

Heather Arellano                                                                                   

                                                                                                  

Corrections: (The following items were deleted from the chart)                                    

                                                                                             

19:36 18:51 2020 18:51 Discharged to Home. Impression: Unspecified abdominal pain;      wh  

      Urinary tract infection, site not specified. Condition is Stable. Forms are Medication      

      Reconciliation Form, Thank You Letter, Antibiotic Education, Prescription Opioid Use.       

      Follow up: Emergency Department; When: As needed; Reason: Worsening of condition.           

      Follow up: Private Physician; When: 2 - 3 days; Reason: Recheck today's complaints,         

      Continuance of care, Re-evaluation by your physician. Problem is new. Symptoms have         

      improved. pm1                                                                               

                                                                                                  

**************************************************************************************************

## 2020-06-09 NOTE — RAD REPORT
EXAM DESCRIPTION:  CT - Abdomen   Pelvis W Contrast - 6/9/2020 6:05 pm

 

CLINICAL HISTORY:  ABD PAIN

 

COMPARISON:  No comparisons

 

TECHNIQUE:  Biphasic, helical CT imaging of the abdomen and pelvis was performed following 100 ml non
-ionic IV contrast.

 

No oral contrast given.

 

All CT scans are performed using dose optimization technique as appropriate and may include automated
 exposure control or mA/KV adjustment according to patient size.

 

FINDINGS:  No suspicious findings in the lung bases.

 

The liver, spleen, and pancreas show no suspicious findings. Cholecystectomy clips are present. No bi
liary tree dilatation. Small benign-appearing cysts are present in the liver parenchyma.

 

Symmetric renal function is seen with no hydronephrosis or suspicious renal mass. No pyelonephritis o
r acute parenchymal process. Small renal cysts are present. Right adrenal gland is negative. Left adr
enal gland contains a 16 millimeter mass. This does not meet adrenal adenoma criteria but is still th
e most likely etiology. No urinary bladder abnormality.

 

Uterus is absent. Ovaries are absent or atrophic. No adnexal mass.

 

No dilated bowel loops or bowel wall thickening. Patient has a minimal hiatal hernia. No evidence for
 appendicitis. Appendix is not clearly seen and may be absent. You small mesenteric lymph nodes are p
resent. No free air, free fluid or inflammatory stranding.  No hernia, mass or bulky lymphadenopathy.


 

Disc and bone degenerative changes are present. Posterior decompression changes are present L4-5. Faizan
tral canal detail is inherently limited.

 

 

IMPRESSION:  Contrast-enhanced CT abdomen and pelvis imaging shows no acute or emergent finding.

 

An incidental 16 millimeter left adrenal mass is present. This is most likely an adenoma but does not
 meet the strict diagnostic criteria. In the absence of any malignancy history or significant clinica
l history, this can be re-evaluated with CT imaging in 12 months.

## 2022-04-05 ENCOUNTER — HOSPITAL ENCOUNTER (INPATIENT)
Dept: HOSPITAL 97 - 5TH | Age: 72
LOS: 22 days | Discharge: HOME HEALTH | DRG: 57 | End: 2022-04-27
Attending: PSYCHIATRY & NEUROLOGY | Admitting: PSYCHIATRY & NEUROLOGY
Payer: COMMERCIAL

## 2022-04-05 VITALS — BODY MASS INDEX: 28.1 KG/M2

## 2022-04-05 DIAGNOSIS — I10: ICD-10-CM

## 2022-04-05 DIAGNOSIS — N39.0: ICD-10-CM

## 2022-04-05 DIAGNOSIS — E11.9: ICD-10-CM

## 2022-04-05 DIAGNOSIS — E03.9: ICD-10-CM

## 2022-04-05 DIAGNOSIS — I69.392: ICD-10-CM

## 2022-04-05 DIAGNOSIS — B96.89: ICD-10-CM

## 2022-04-05 DIAGNOSIS — Z20.822: ICD-10-CM

## 2022-04-05 DIAGNOSIS — I69.354: Primary | ICD-10-CM

## 2022-04-05 PROCEDURE — 82947 ASSAY GLUCOSE BLOOD QUANT: CPT

## 2022-04-05 PROCEDURE — 85025 COMPLETE CBC W/AUTO DIFF WBC: CPT

## 2022-04-05 PROCEDURE — 82040 ASSAY OF SERUM ALBUMIN: CPT

## 2022-04-05 PROCEDURE — 87077 CULTURE AEROBIC IDENTIFY: CPT

## 2022-04-05 PROCEDURE — 97530 THERAPEUTIC ACTIVITIES: CPT

## 2022-04-05 PROCEDURE — 81001 URINALYSIS AUTO W/SCOPE: CPT

## 2022-04-05 PROCEDURE — 97116 GAIT TRAINING THERAPY: CPT

## 2022-04-05 PROCEDURE — 97130 THER IVNTJ EA ADDL 15 MIN: CPT

## 2022-04-05 PROCEDURE — 70450 CT HEAD/BRAIN W/O DYE: CPT

## 2022-04-05 PROCEDURE — 97542 WHEELCHAIR MNGMENT TRAINING: CPT

## 2022-04-05 PROCEDURE — 70553 MRI BRAIN STEM W/O & W/DYE: CPT

## 2022-04-05 PROCEDURE — 87186 SC STD MICRODIL/AGAR DIL: CPT

## 2022-04-05 PROCEDURE — 97161 PT EVAL LOW COMPLEX 20 MIN: CPT

## 2022-04-05 PROCEDURE — 83735 ASSAY OF MAGNESIUM: CPT

## 2022-04-05 PROCEDURE — 84134 ASSAY OF PREALBUMIN: CPT

## 2022-04-05 PROCEDURE — 97110 THERAPEUTIC EXERCISES: CPT

## 2022-04-05 PROCEDURE — 92507 TX SP LANG VOICE COMM INDIV: CPT

## 2022-04-05 PROCEDURE — 36415 COLL VENOUS BLD VENIPUNCTURE: CPT

## 2022-04-05 PROCEDURE — 97112 NEUROMUSCULAR REEDUCATION: CPT

## 2022-04-05 PROCEDURE — 87086 URINE CULTURE/COLONY COUNT: CPT

## 2022-04-05 PROCEDURE — 80048 BASIC METABOLIC PNL TOTAL CA: CPT

## 2022-04-05 PROCEDURE — 92523 SPEECH SOUND LANG COMPREHEN: CPT

## 2022-04-05 PROCEDURE — 87088 URINE BACTERIA CULTURE: CPT

## 2022-04-05 PROCEDURE — 97129 THER IVNTJ 1ST 15 MIN: CPT

## 2022-04-08 NOTE — R.PREADM
PRE-ADMISSION SCREENING FORM



SCREENING DATE AND TIME



2022 08:44 (CDT) 



ANTICIPATED REHAB ADMISSION DATE



2022 



REFERRING FACILITY



[facility] 



REFERRAL DATE AND TIME



2022 08:44 (CDT) 



REFERRAL ROOM#



213 



ACUTE ADMIT DATE



2022 





Previous Rehabilitation(s):





No.



ACUTE /DC PLANNER



Sherron 



ATTENDING PHYSICIAN



 



REFERRING PHYSICIAN



 



REHAB FACILITY



[facility] 



CLINICAL LIAISON



Rubin Lovell 



PHYSICIAN REVIEWER



Dr. Fabio Shaffer M.D. 



MR#



[id number] 



ACCT#



[id number] 



NAME



JOSE ORO



ADDRESS



[address] [facility] 



PHONE



[phone] 



[facility]



[id number] 



DATE OF BIRTH



1950 



AGE



71 



SSN#



[id number] 



GENDER



female 



MARITAL STATUS



 



PREF. LANGUAGE (IF NON-ENGLISH)



English 



ADMIT FROM



02 - [facility] 



PRE-HOSPITAL LIVING SETTING



01 - Home (private home/apt. board/care, assisted living, group home, transitional living) 



HOME TYPE AND DETAILS



Type of home: single family house

# of steps to enter the residence: 0

# of levels in the residence: 1

# of steps within the residence: 0



PRE-HOSPITAL LIVING WITH



Family/Relatives 



FAMILY SUPPORT



Yes 



PRIMARY FAMILY CONTACT NAME



CORBIN GUZMAN 



PRIMARY FAMILY CONTACT PHONE



[phone] 



PRIMARY FAMILY CONTACT RELATIONSHIP



DAUGHTER 



PHONE PRIMARY FAMILY CONTACT ON ADM.?



no 



IS PRIMARY FAMILY CONTACT AUTH. REP.?



no 



1ST EMERGENCY CONTACT



CORBIN GUZMAN 



1ST CONTACT PHONE



[phone] 



1ST CONTACT RELATIONSHIP



DAUGHTER 



PHONE 1ST CONTACT ON ADM.



no 



IS 1ST CONTACT AUTH. REP.?



no 



PHONE 2ND CONTACT ON ADM.?



no 



PATIENT EMPLOYMENT STATUS



Retired (for age) 



PATIENT EMPLOYER



No Employer 



PAYOR INFORMATION:



[facility] 



1ST PAYOR PHONE



[phone] 



1ST PAYOR POLICY ID



[id number] 



INJURY/ILLNESS DUE TO ACCIDENT?



No 



ANOTHER PARTY RESPONSIBLE?



No 



PRIMARY REHAB/ACUTE DIAGNOSIS:



STROKE



ONSET DATE



2022



REHAB IMPAIRMENT CATEGORY (RHIANNON):



01 Stroke (STR) MEETS 60% rule



AFFECTED EXTREMITIES:



LLE, and LUE



PRIMARY DIAGNOSIS-RELATED SURGERIES:



N/A



INTERVENTIONS:



- A-Fib

monitor for complications

Vitals will be monitored regularly and medications administered as indicated by Physician

- CHF

monitoring of patient symptoms and medication management by physician

Daily weights will be obtained

Regular assessment of patient vitals.

- Diabetes

Monitor blood glucose levels and administer medication as indicated by Physician

Diet will be customized to manage diabetic needs.

- HTN

Blood pressure will be regularly assessed and medications administered as per physician recommendatio
ns.



RISK FOR COMPLICATIONS:



- Skin Breakdown

Nursing will assess skin daily using assessment tool and will place on Skin Breakdown Precautions as 
Indicated per protocol

- CARDIC

monitoring heart rate/signs and symptoms for cardiac distress

- Weakness

Regular therapeutic activity and exercise

Strengthening exercises to be performed

- CVA

pt's blood pressures have been inconsistent and require monitoring and medication management as inidi
cated by physician.

pt has history of CVA.  Will monitor blood pressure and manage with medication.

- Diabetic Complications

pt will receive a specialized diet to help manage blood glucose levels

Regular monitoring and management of blood glucose levels.

- Dysphagia

SLP will work on patient's ability to swallow

- Pneumonia

pt will be instructed on use of and be encouraged to use incentive spirometry

Interval Chest X-Rays will be obtained as necessary

SLP will provide therapeutic services to improve pt's ability to safely swallow



SUMMARY OF ACUTE HOSPITALIZATION:



Pt. is a 70 yo Right-handed female.

On 2022 Pt. presented to [facility] with sudden onset of left-side weakness.

On 2022 she was admitted to [facility] with diagnosis STROKE.

Her impairment category is Stroke 01 -  Left Body (Right Brain) (01.1).

Pre-morbidly, Pt. was independent/mod-I in Locomotion, Safety Awareness, Social Cognition, and Balanc
e; and she had good Sphincter Control, Self-Care, Communication, and Endurance.

Currently, she has deficits of Locomotion, Safety Awareness, Balance, Social Cognition, Transfers Con
trol, Self-Care, Sphincter Control, Communication, and Endurance.

Pt. is now referred to [facility] for acute in-patient rehabilitation in order
 to maximize patient's functional independence in activities of daily living, strength, ROM, and mobi
lity.

Patient has realistic goal of being discharged at assistance level 7-Ind to reside at Home with  Fami
ly/Relatives.





PAST MEDICAL HISTORY



Type 2 diabetes mellitus without complications (E11.9)

HYPOTHYROIDISM

HISTORY OF STROKE

Essential (primary) hypertension (I10)

Repeated falls (R29.6)



MEDICATION ALLERGIES:



No Known Drug Allergies (NKDA)



ENVIRONMENTAL ALLERGIES:



- Substance Allergies

None Known

- Other Allergies

None Known



CODE STATUS:



Full code



WEIGHT/HEIGHT/BMI:



WEIGHT



180 lbs 



HEIGHT



5' 7" 



BMI



28.2 



DIET:



- Diet Type

Regular

- Diet - Solid Texture

Regular

- Diet - Liquid Texture

Regular

- Tube Feed

N/A



SKIN DIAGRAM:







   on Head; extent - small; stage - NS(Not Stageable). Treatment -   .



REVIEW OF SYSTEMS:



- Gen

Alert and awake

Lying in bed

No apparent distress

Oriented to: person, time, and place

- Vital Signs

Temperature: 97.4 F

SBP/DBP: 149/54

Pulse: 68

Resp: 16

Vital signs stable, afebrile

- CVS

RRR



VITAL SIGNS



Temperature: 97.4 F

SBP/DBP: 149/54

Pulse: 68

Resp: 16

Vital signs stable, afebrile



MEDICATIONS/TREATMENT:



Other-  See attached MAR (Medication Administration Record).



CURRENT SPHINCTER CONTROL:



Pre-hospital bladder status: unspecified

# of bladder accidents in the last 7 days prior to screenin

Pre-hospital bowel status: unspecified

# of bowel accidents in the last 7 days prior to screenin

Last Bowel Movement Date: 2022



CURRENT LOCOMOTION STATUS:



distance walked 180  feet rw



DETAILED CURRENT FUNCTIONAL STATUS:



- Bladder

accident frequency: 7-Ind - No accidents in the past 7 days

- Bowel

accident frequency: 7-Ind - No accidents in the past 7 days

- Walking

score based on distance walked: 0(N/A)

score based on distance walked: 3(>=150ft)

- Wheelchair

score based on distance traveled: 0(N/A)



QI SCORES:



- Self-Care

A. Eating  03-Partial/moderate assistance  

B. Oral hygiene  03-Partial/moderate assistance  

C. Toileting hygiene  03-Partial/moderate assistance  

E. Shower/bathe self  02-Substantial/maximal assistance  

F. Upper body dressing  02-Substantial/maximal assistance  

G. Lower body dressing  02-Substantial/maximal assistance  

H. Putting on/taking off footwear  88-Not attempted due to medical condition or safety concerns  

- Mobility

A. Roll left and right  03-Partial/moderate assistance  

B. Sit to lying  03-Partial/moderate assistance  

C. Lying to sitting on side of bed  03-Partial/moderate assistance  

D. Sit to stand  03-Partial/moderate assistance  

E. Chair/bed-to-chair transfer  03-Partial/moderate assistance  

F. Toilet transfer  03-Partial/moderate assistance  

G. Car transfer  88-Not attempted due to medical condition or safety concerns  

I. Walk 10 feet  03-Partial/moderate assistance  

J. Walk 50 feet with two turns  03-Partial/moderate assistance  

K. Walk 150 feet  03-Partial/moderate assistance  

L. Walking 10 feet on uneven surfaces  88-Not attempted due to medical condition or safety concerns  


M. 1 step (curb)  88-Not attempted due to medical condition or safety concerns  

N. 4 steps  88-Not attempted due to medical condition or safety concerns  

O. 12 steps  88-Not attempted due to medical condition or safety concerns  

P. Picking up object  88-Not attempted due to medical condition or safety concerns  

R. Wheel 50 feet with two turns  88-Not attempted due to medical condition or safety concerns  

S. Wheel 150 feet  88-Not attempted due to medical condition or safety concerns  

- Bladder and Bowel

Bladder continence      

Bowel continence      

- Endurance

Fair

- Balance

Fair

- Safety Awareness

Fair



CURRENT FUNC. DEFICITS:



Self-Care, Mobility, Endurance, Balance, and Safety Awareness



CURRENT / PREVIOUS ASSISTIVE DEVICES:



Rolling Walker





HISTORY OF FALLS. HAS THE PATIENT HAD TWO OR MORE FALLS IN THE PAST YEAR OR ANY FALL WITH INJURY IN T
HE PAST YEAR?:



Yes 



PRIOR SURGERY. DID THE PATIENT HAVE MAJOR SURGERY DURING  DAYS PRIOR TO ADMISSION?:



No 



THERAPY NOTES FROM ACUTE CARE:



Attached.



SPECIAL NEEDS:



- Safety Concerns

Fall precautions needed due to Poor balance

Skin breakdown precautions needed due to skin breakdown risk



PRECAUTIONS:



- Weight Bearing Precaution

WBAT left LE

- Fall Precaution

SAFTY AND FALL



PATIENT NEEDS ACTIVE AND ONGOING THERAPEUTIC INTERVENTION OF MULTIPLE THERAPY DISCIPLINES, INCLUDING:




- Dietary and Nutrition

Adequate Nutrition. Nutritional Education. Nutritional Supplements. Evaluate and Treat. 



- Occupational Therapy

Cognitive Retraining. Patient needs Occupational Therapy for a daily minimum of 1.5 hours at least 5 
out of 7 days, to improve Activities of Daily Living, including: Eating, Grooming, Bathing, Dressing,
 Toileting, Toilet Transfers, Community Reintegration, Higher functional activities, Adaptive Equipme
nt, Splinting, Household Tasks, and Other activities as determined. Visual Perceptual Training. Evalu
ate and Treat. Transfer Training. Safety Awareness. Patient/Family Education. ADL Training. Household
 Tasks. Eating. 



- Speech Therapy

Cognitive Training. Expressive Language Skills. Memory Strategies. Patient needs Speech Therapy for a
 daily minimum of 1.5 hours at least 5 out of 7 days, to improve: Swallowing, Cognition, Language Ski
lls, and Compensatory Strategies. Receptive Language Skills. Speech Intelligibility Training. Evaluat
e and Treat. 



- Physical Therapy

Patient needs Physical Therapy for a daily minimum of 1.5 hours at least 5 out of 7 days, to improve:
 Mobility, Strengthening, Transfers, Stretching, ROM, Endurance, Ability to manage stairs, Gait, and 
Balance. Mobility Training. Safety Awareness. Gait Training. Balance Training. Transfer Training. Apoorva
luate and Treat. Patient/Family Education. LE Strengthening. 



PATIENT NEEDS CLOSE MEDICAL SUPERVISION BY A REHABILITATION PHYSICIAN FOR:



Coordination of Treatment Team

Wound Care

Medical and Co-Morbidity Management

Pain Management

DVT Management

Diabetes Management



PATIENT REQUIRES 24X7 REHAB NURSING FOR MEDICAL AND FUNCTIONAL MGT. OF THE FOLLOWING DEFICITS:



Disease Management

Medication Management

Patient requires 24x7 Rehabilitation Nursing for: Pain Issues, Identifying and preventing risk factor
s, Monitoring and reporting current medical conditions, Assisting with ambulation and transfer, Jackie
ting with all ADL-s, Teaching patients about disease process and medications, Family teaching, Provid
ing safe environment, Bowel and Bladder Issues, Skin Integrity, and Medication Management

Patient/Family Education

Providing Safe Environment

Skin Integrity

Pain Management

Bowel and Bladder Management



PATIENT REQUIRES INTENSIVE, COORDINATED INTERDISCIPLINARY APPROACH TO REHAB:



Arranging Home Equipment/Services

Discharge Planning

Family Intervention/Training

Patient needs Dietary and Nutrition Services for: Adequate Nutrition, Nutritional Supplements, and Nu
tritional Education

Patient needs  and/or Case Management for: Discharge Planning, Arranging Home Equipmen
t or Services, and Family Interventions

/Case Management



PATIENT REHAB POTENTIAL:



SEE ORO is able and expected to receive 3 hours of individualized therapy daily on at least 5 of mateo
ry 7 days

SEE HELMSs prognosis for significant practical improvement within a reasonable period of time appears
 Good

Expected level of measurable improvement will be of a practical value to SEE ORO's functional capaci
ty or adaptations to impairments

Has a viable Discharge Plan

Medically appropriate; condition is sufficiently stable to participate in intensive rehab program



DISCHARGE PLAN:



- Estimated Length of Stay (days)

17. 



- Consensus on plan

Discharge plan has been discussed with primary caregiver. Patient/Family is in agreement with the leopoldo
n. Primary caregiver is in agreement with the plan. 



- Patient/Family Goals

Return home independently. 



- Planned Living Setting Upon Discharge

Home, to live with Family/Relatives. Transitional Living. 



RECOMMENDED CARE LEVEL:



IRF



RECOMMENDATION DETAILS:



Recommended Admission to Comprehensive Rehabilitation Program to Increase Functional [place]



SCREENER'S COMPLETENESS CONFIRMATION:



- Screening Confirmation

The patient data collection on this preadmission screening form is finished



PHYSICIANS REVIEW AND ADMISSION DETERMINATION



Admit - Based on my review of the Pre-Admission Screening results, in my medical judgment and experie
nce, I concur with the findings and recommend admission to [facility], as this
 patient requires an IRF level of care.



SIGNATURE PANEL:



 - [electronically] signed by Rubin Lovell on 2022 at 15:10 (CDT)

 - [electronically] signed by Eran Hurst PT on 2022 at 15:55 (CDT)

Physician Reviewer - [electronically] signed by Dr. Fabio Shaffer M.D. on 2022 at 16:00 (CDT
)

## 2022-04-10 RX ADMIN — GABAPENTIN SCH MG: 300 CAPSULE ORAL at 20:42

## 2022-04-10 RX ADMIN — ATORVASTATIN CALCIUM SCH MG: 40 TABLET, FILM COATED ORAL at 20:43

## 2022-04-10 RX ADMIN — HUMAN INSULIN SCH UNIT: 100 INJECTION, SOLUTION SUBCUTANEOUS at 20:41

## 2022-04-10 RX ADMIN — LOSARTAN POTASSIUM SCH MG: 50 TABLET, FILM COATED ORAL at 20:42

## 2022-04-10 RX ADMIN — HUMAN INSULIN SCH UNIT: 100 INJECTION, SOLUTION SUBCUTANEOUS at 17:20

## 2022-04-10 RX ADMIN — LATANOPROST SCH DROP: 50 SOLUTION OPHTHALMIC at 20:42

## 2022-04-10 NOTE — RAD REPORT
EXAM DESCRIPTION:  CT - Head Brain Wo Cont - 4/10/2022 7:08 pm

 

CLINICAL HISTORY:  R/O hemorhagic coversion

 

COMPARISON:  Ct Stroke Brain Wo Cont dated 4/3/2022; Head angio dated 11/28/2019; Brain W/Wo Cont glenis
ed 4/4/2022

 

TECHNIQUE:  All CT scans are performed using dose optimization technique as appropriate and may inclu
de automated exposure control or mA/KV adjustment according to patient size.

 

FINDINGS:  No intracranial hemorrhage, hydrocephalus or extra-axial fluid collection.No areas of brai
n edema or evidence of midline shift. Remote left cerebellar infarct. Pontine infarcts again identifi
ed.

 

The paranasal sinuses and mastoids are clear. The calvarium is intact.

 

IMPRESSION:  Subacute pontine infarct. No new infarcts or evidence of acute intracranial hemorrhage i
dentified.

## 2022-04-10 NOTE — XMS REPORT
Continuity of Care Document

                            Created on:April 10, 2022



Patient:JOSE ORO

Sex:Female

:1950

External Reference #:[id number]





Demographics







                          Address                   [address] [place] [address]

 

                          Home Phone                [phone]

 

                          Mobile Phone              [phone]

 

                          Email Address             [email or web address]

 

                          Preferred Language        English

 

                          Marital Status            Unknown

 

                          Adventist Affiliation     Unknown

 

                          Race                      Unknown

 

                          Additional Race(s)        White



                                                    Unavailable

 

                          Ethnic Group              Not  or 









Author







                          Organization              [facility]

t

 

                          Address                   [address] [place] [address] [address]



                                                    [place] [address]

 

                          Phone                     [phone]









Support







                Name            Relationship    Address         Phone

 

                Dayna       Daughter        Unavailable     [phone]









Care Team Providers







                    Name                Role                Phone

 

                    Nasrin Couch MD Attending Clinician [phone]

[phone]

 

                    BRIANNA MINAYA       Attending Clinician Unavailable

 

                    ASHLEY                 Admitting Clinician Unavailable









Payers







           Payer Name Policy Type Policy Number Effective Date Expiration Date S

ource







Problems







       Condition Condition Condition Status Onset  Resolution Last   Treating Co

mments 

Source



       Name   Details Category        Date   Date   Treatment Clinician        



                                                 Date                 

 

       Diabetes Diabetes Disease Active                              [facility]

r



       (HCCode) (HCCode)                                              [facility]

e



                                   00:00:                             of



                                   00                                 [facility]



                                                                      e

 

       Hypothyroi Hypothyroi Disease Active                              [facility]

[facility]



       dism   dism                 



                                   00:00:                             of



                                   00                                 [facility]



                                                                      e

 

       Hypertensi Hypertensi Disease Active                              [facility]

[facility]



       on     on                   



                                   00:00:                             of



                                   00                                 [facility]



                                                                      e

 

       Stroke Stroke Disease Active                              [facility]



       (cerebrum) (cerebrum)                                              Co

llege



       (HCCode) (HCCode)               00:00:                             of



                                   00                                 [facility]



                                                                      e







Allergies, Adverse Reactions, Alerts







       Allergy Allergy Status Severity Reaction(s) Onset  Inactive Treating Comm

ents 

Source



       Name   Type                        Date   Date   Clinician        

 

       Codeine Propensi Active               2019                      [facility]



              ty to                       



              adverse                      00:00:                      of



              reaction                      00                          [facility]



              s to                                                    e



              drug                                                    







Social History







           Social Habit Start Date Stop Date  Quantity   Comments   Source

 

           History [facility]

ge



           Alcohol Std Drinks                                             of Med

icine

 

           History [facility]

ge



           Alcohol Binge                                             of Medicine

 

           Sex Assigned At                                             [facility]

llege



           Birth                                                  of Medicine

 

           Alcohol intake 2020 Lifetime              [facility]

lege



                      00:00:00   00:00:00   non-drinker            of Medicine



                                            (finding)             

 

           History SDOH 2019 1                     [facility]



           Alcohol Frequency 00:00:00   00:00:00                         of [facility]









                Smoking Status  Start Date      Stop Date       Source

 

                Never smoker                                    [facility] o

f Medicine







Medications







       Ordered Filled Start  Stop   Current Ordering Indication Dosage Frequency

 Signature

                    Comments            Components          Source



     Medication Medication Date Date Medication? Clinician                (SIG) 

          



     Name Name                                                   

 

     clonidine      2020-0      Yes            .2mg      Take 0.2           [facility]

or



     (CATAPRESS)      2-19                               mg by           [facility]



     0.2 MG      20:34:                               mouth as           of



     tablet      40                                 needed.           [facility]



                                                                 e

 

     atenolol      2020-0      Yes            100mg      Take 100           [facility]

or



     (TENORMIN)      2-19                               mg by           [facility]



     100 MG      20:34:                               mouth two           of



     tablet      04                                 times           [facility]



                                                  daily.           e

 

     diltiazem      2020-0      Yes            120mg      Take 120           [facility]

anton[facility]



     120 MG TABS      2-19                               mg by           [facility]



               20:34:                               mouth two           of



               04                                 times           [facility]



                                                  daily.           e

 

     furosemide      2020-0      Yes            40mg      Take 40 mg           [facility]

[facility]



     (LASIX) 40      2-19                               by mouth           [facility]

ge



     MG tablet      20:34:                               two times           of



               04                                 daily.           [facility]



                                                                 e

 

     glimepiride      2020-0      Yes            4mg       Take 4 mg           [facility]

[facility]



     (AMARYL) 4      2-19                               by mouth           [facility]

ge



     MG tablet      20:34:                               two times           of



               04                                 daily.           [facility]



                                                                 e

 

     Lansoprazol      2020-0      Yes            15mg      Take 15 mg           

[facility]



     e 15 MG      2-19                               by mouth           [facility]



     TBDD      20:34:                               daily.           of



               04                                                [facility]



                                                                 e

 

     lisinopril      2020-0      Yes            40mg      Take 40 mg           [facility]

[facility]



     (PRINIVIL,      2-19                               by mouth           Col[facility]

ge



     ZESTRIL) 40      20:34:                               two times           o

f



     MG tablet      04                                 daily.           [facility]



                                                                 e

 

     pantoprazol      2020-0      Yes            40mg      Take 40 mg           

[facility]



     e         2-19                               by mouth           [facility]



     (PROTONIX)      20:34:                               daily.           of



     40 MG      04                                                [facility]



     tablet                                                        e

 

     potassium      2020-0      Yes            10meq      Take 10           [facility]

or



     chloride      2-19                               mEq by           [facility]



     (KDUR) 10      20:34:                               mouth           of



     MEQ tablet      04                                 daily.           [facility]



                                                                 e

 

     gabapentin      2020-0      Yes            100mg      Take 100           [facility]

[facility]



     (NEURONTIN)      2-19                               mg by           [facility]



     100 MG      20:34:                               mouth 3           of



     capsule      04                                 times           [facility]



                                                  daily.           e

 

     aspirin 81      2020-      Yes            81mg      Take 81 mg           [facility]

[facility]



     MG tablet                                     by mouth           [facility]

e



               20:34:                               daily.           of



               04                                                [facility]



                                                                 e

 

     THYROID OR      2020- No             30mg      Take 30 mg           

[facility]



                                         by mouth           [facility]



               20:33: 00:00                          daily.           of



               56   :00                           Take every           [facility]



                                                  morning on           e



                                                  a empty           



                                                  stomach.           

 

     clonidine      2020- No             .2mg      Take 0.2           [facility]

[facility]



     (CATAPRESS)                                mg by           [facility]

e



     0.2 MG      20:33: 00:00                          mouth 3           of



     tablet      53   :00                           times           [facility]



                                                  daily.           e

 

     atorvastati      2020- No             80mg      Take 80 mg          

 [facility]



     n (LIPITOR)                                by mouth           Col

lege



     80 MG      20:33: 00:00                          daily.           of



     tablet      50   :00                                          [facility]



                                                                 e







Vital Signs







             Vital Name   Observation Time Observation Value Comments     Source

 

             Systolic blood 2020 20:42:00 130 mm[Hg]                [facility]



             pressure                                            Medicine

 

             Diastolic blood 2020 20:42:00 78 mm[Hg]                 [facility]



             pressure                                            Medicine

 

             Heart rate   2020 20:32:00 49 /min                   [facility]

ollege of



                                                                 Medicine

 

             Body height  2020 20:32:00 170.2 cm                  [facility]

ollege of



                                                                 Medicine

 

             Body weight  2020 20:32:00 77.111 kg                 [facility]

ollege of



                                                                 Medicine

 

             BMI          2020 20:32:00 26.63 kg/m2               [facility]

ollege of



                                                                 Medicine

 

             Systolic blood 2020 20:42:00 130 mm[Hg]                [facility]



             pressure                                            Medicine

 

             Diastolic blood 2020 20:42:00 78 mm[Hg]                 [facility]                                            Medicine

 

             Heart rate   2020 20:32:00 49 /min                   [facility]

ollege of



                                                                 Medicine

 

             Body height  2020 20:32:00 170.2 cm                  [facility]

ollege of



                                                                 Medicine

 

             Body weight  2020 20:32:00 77.111 kg                 [facility]

ollege of



                                                                 Medicine

 

             BMI          2020 20:32:00 26.63 kg/m2               [facility]

ollege of



                                                                 Medicine







Procedures

This patient has no known procedures.



Plan of Care







             Planned Activity Planned Date Details      Comments     Source

 

             Future Scheduled              ELECTROCARDIOGRAM COMPLETE           

   [facility]



             Test                      [code = 03260]              Medicine

 

             Future Scheduled              COLON CANCER SCREENING:              

[facility]



             Test                      COLONOSCOPY [code = COLON              Me

dicine



                                       CANCER SCREENING:              



                                       COLONOSCOPY]              

 

             Future Scheduled              MAMMOGRAM ANNUAL [code =             

 [facility]



             Test                      MAMMOGRAM ANNUAL]              Medicine

 

             Future Scheduled              TETANUS SHOT (ADULT) [code           

   [facility]



             Test                      = TETANUS SHOT (ADULT)]              Medi

cine

 

             Future Scheduled              BMI FOLLOW UP PLAN [code =           

   [facility]



             Test                      BMI FOLLOW UP PLAN]              Medicine

 

             Future Scheduled              HEPATITIS C SCREENING [code          

    [facility]



             Test                      = HEPATITIS C SCREENING]              Med

icine

 

             Future Scheduled              FALL SCREEN [code = FALL             

 [facility]



             Test                      SCREEN]                   Medicine

 

             Future Scheduled              OSTEOPOROSIS SCREENING              B

[facility]



             Test                      [code = OSTEOPOROSIS              Medicin

e



                                       SCREENING]                

 

             Future Scheduled              PNEUMOVAX >=65 (PPSV23)              

[facility]



             Test                      [code = PNEUMOVAX >=65              Medic

ine



                                       (PPSV23)]                 

 

             Future Scheduled              PREVNAR >= 65 (PCV13) [code          

    [facility]



             Test                      = PREVNAR >= 65 (PCV13)]              Med

icine

 

             Future Scheduled              MEDICARE AWV (Initial)              B

[facility]



             Test                      [code = MEDICARE AWV              Medicin

e



                                       (Initial)]                

 

             Future Scheduled              FLU VACCINE > 6 MONTHS              B

[facility]



             Test                      [code = FLU VACCINE > 6              Medi

cine



                                       MONTHS]                   







Encounters







        Start   End     Encounter Admission Attending Care    Care    Encounter 

Source



        Date/Time Date/Time Type    Type    Clinicians Facility Department ID   

   

 

        2020 Office          [facility]     [id number]

 



        14:23:16 15:20:30 Visit           [facility] AMBULATOR [id number]         



                                        Margo  Y       [id number]         



                                        Nasrin          [phone]         



                                                        300             

 

        2020 Office          [facility]     [id number]

[address] [place] [address]



        14:23:16 15:20:30 Visit           Armando, AMBULATOR [id number]         

[facility]



                                        Margo  Y       [id number]         



                                        Nasrin          [phone]         Medi

daphney



                                                        300             e







Results







           Test Description Test Time  Test Comments Results    Result Comments 

Source









                    RPR                 2019 04:27:00 









                      Test Item  Value      Reference Range Interpretation Comme

nts









             RPR SCREEN (BEAKER) (test code = 420) Nonreactive  Nonreactive     

          



POCT-GLUCOSE [id number] 16:12:00





             Test Item    Value        Reference Range Interpretation Comments

 

             POC-GLUCOSE METER 278 mg/dL           H            : TESTED A

T [facility] [address]



             (BEAKER) (test code =                                        [facility],



             1538)                                               [id number]:



                                                                 /Techni

robbie ID



                                                                 = [id number] for AK

INSONU,



                                                                 [facility]



POCT-GLUCOSE [id number] 13:34:00





             Test Item    Value        Reference Range Interpretation Comments

 

             POC-GLUCOSE METER 349 mg/dL           H            : TESTED A

T [facility] [address]



             (BEAKER) (test code =                                        [facility],



             1538)                                               [id number]:



                                                                 /Techni

robbie ID



                                                                 = [id number] for AK

INSONU,



                                                                 [facility]



POCT-GLUCOSE [id number] 08:38:00





             Test Item    Value        Reference Range Interpretation Comments

 

             POC-GLUCOSE METER 247 mg/dL           H            : TESTED A

T [facility] [address]



             (BEAKER) (test code =                                        [facility],



             1538)                                               [id number]:



                                                                 /Techni

robbie ID



                                                                 = [id number] for AK

INSONU,



                                                                 [facility]



[id number] 05:38:00





             Test Item    Value        Reference Range Interpretation Comments

 

             MAGNESIUM (BEAKER) 1.9 mg/dL    1.6-2.6                   Specimen 

moderately



             (test code = 627)                                        hemolyzed



BASIC METABOLIC [id number] 05:38:00





             Test Item    Value        Reference Range Interpretation Comments

 

             SODIUM (BEAKER) 138 meq/L    136-145                   



             (test code = 381)                                        

 

             POTASSIUM (BEAKER) 3.5 meq/L    3.5-5.1                   Specimen 

moderately



             (test code = 379)                                        hemolyzed

 

             CHLORIDE (BEAKER) 108 meq/L           H            



             (test code = 382)                                        

 

             CO2 (BEAKER) (test 23 meq/L     22-29                     



             code = 355)                                         

 

             BLOOD UREA NITROGEN 7 mg/dL      7-21                      



             (BEAKER) (test code                                        



             = 354)                                              

 

             CREATININE (BEAKER) 0.81 mg/dL   0.57-1.25                 Specimen

 moderately



             (test code = 358)                                        hemolyzed

 

             GLUCOSE RANDOM 291 mg/dL           H            



             (BEAKER) (test code                                        



             = 652)                                              

 

             CALCIUM (BEAKER) 8.4 mg/dL    8.4-10.2                  



             (test code = 697)                                        

 

             EGFR (BEAKER) (test 70 mL/min/1.73                           ESTIM[facility]

EMILY GFR IS



             code = 1092) sq m                                   NOT AS ACCURATE

 AS



                                                                 CREATININE



                                                                 CLEARANCE IN



                                                                 PREDICTING



                                                                 GLOMERULAR



                                                                 FILTRATION RATE

.



                                                                 ESTIMATED GFR I

S



                                                                 NOT APPLICABLE 

FOR



                                                                 DIALYSIS PATIEN

TS.



CBC W/PLT COUNT &amp; AUTO [id number] 05:14:00





             Test Item    Value        Reference Range Interpretation Comments

 

             WHITE BLOOD CELL COUNT (BEAKER) 8.1 K/ L     3.5-10.5              

    



             (test code = 775)                                        

 

             RED BLOOD CELL COUNT (BEAKER) 4.83 M/ L    3.93-5.22               

  



             (test code = 761)                                        

 

             HEMOGLOBIN (BEAKER) (test code = 14.6 GM/DL   11.2-15.7            

     



             410)                                                

 

             HEMATOCRIT (BEAKER) (test code = 42.3 %       34.1-44.9            

     



             411)                                                

 

             MEAN CORPUSCULAR VOLUME (BEAKER) 87.6 fL      79.4-94.8            

     



             (test code = 753)                                        

 

             MEAN CORPUSCULAR HEMOGLOBIN 30.2 pg      25.6-32.2                 



             (BEAKER) (test code = 751)                                        

 

             MEAN CORPUSCULAR HEMOGLOBIN CONC 34.5 GM/DL   32.2-35.5            

     



             (BEAKER) (test code = 752)                                        

 

             RED CELL DISTRIBUTION WIDTH 12.0 %       11.7-14.4                 



             (BEAKER) (test code = 412)                                        

 

             PLATELET COUNT (BEAKER) (test 195 K/CU MM  150-450                 

  



             code = 756)                                         

 

             MEAN PLATELET VOLUME (BEAKER) 12.6 fL      9.4-12.3     H          

  



             (test code = 754)                                        

 

             NUCLEATED RED BLOOD CELLS 0 /100 WBC   0-0                       



             (BEAKER) (test code = 413)                                        

 

             NEUTROPHILS RELATIVE PERCENT 53 %                                  

 



             (BEAKER) (test code = 429)                                        

 

             LYMPHOCYTES RELATIVE PERCENT 34 %                                  

 



             (BEAKER) (test code = 430)                                        

 

             MONOCYTES RELATIVE PERCENT 10 %                                   



             (BEAKER) (test code = 431)                                        

 

             EOSINOPHILS RELATIVE PERCENT 2 %                                   

 



             (BEAKER) (test code = 432)                                        

 

             BASOPHILS RELATIVE PERCENT 1 %                                    



             (BEAKER) (test code = 437)                                        

 

             NEUTROPHILS ABSOLUTE COUNT 4.25 K/ L    1.56-6.13                 



             (BEAKER) (test code = 670)                                        

 

             LYMPHOCYTES ABSOLUTE COUNT 2.71 K/ L    1.18-3.74                 



             (BEAKER) (test code = 414)                                        

 

             MONOCYTES ABSOLUTE COUNT (BEAKER) 0.78 K/ L    0.24-0.36    H      

      



             (test code = 415)                                        

 

             EOSINOPHILS ABSOLUTE COUNT 0.19 K/ L    0.04-0.36                 



             (BEAKER) (test code = 416)                                        

 

             BASOPHILS ABSOLUTE COUNT (BEAKER) 0.10 K/ L    0.01-0.08    H      

      



             (test code = 417)                                        

 

             IMMATURE GRANULOCYTES-RELATIVE 1 %          0-1                    

   



             PERCENT (BEAKER) (test code =                                      

  



             2801)                                               



POCT-GLUCOSE [id number] 23:24:00





             Test Item    Value        Reference Range Interpretation Comments

 

             POC-GLUCOSE METER 296 mg/dL           H            : Notified

 RN/MD:



             (MARY) (test code =                                        TESTED

 AT [facility] [address]



             1538)                                               [facility],



                                                                 [id number]:



                                                                 /Techni

robbie ID



                                                                 = [id number] for Jose Marte



POCT-GLUCOSE [id number] 20:42:00





             Test Item    Value        Reference Range Interpretation Comments

 

             POC-GLUCOSE METER 327 mg/dL           H            : Notified

 RN/MD:



             (MARY) (test code =                                        TESTED

 AT [facility] [address]



             1538)                                               [facility],



                                                                 [id number]:



                                                                 /Techni

robbie ID



                                                                 = [id number] for Melinda Quintero



CT, CTANGIO  [id number] 15:43:00FINAL REPORT PATIENT ID:   [id number] 
CLINICAL HISTORY: Stroke, follow up TECHNIQUE: Initially, noncontrast head CT 
images were performed. Contiguous contrast-enhanced axial images through the 
neck followed by axial images through the head with coronal and sagittal 
reformations to assess the arterial circulation. 3-D reconstructions were 
performed using a volume rendered technique separately on a workstation.  This 
exam was performed according to the departmental dose optimization program which
includes automated exposure control, adjustment of the mA and/or kV according to
the patient size, and/or use of an iterative reconstruction technique. Stenosis 
evaluation reported in compliance with NASCET criteria. COMPARISON: MRI brain 
2019, CTA head and neck 2017 FINDINGS: CTA head:Multifocal left border
zone acute infarcts. Remote left cerebellar hemisphere infarct. No acute 
intracranial hemorrhage. There is no hydrocephalus or midline shift. The skull 
is intact.  There is atherosclerosis inthe cavernous ICAs with a short segment 
severe focal stenosis of the right ICA at the petrous/cavernous junction. 
Moderate focal stenosis of the left communicating ICA. Bilateral MCA and LYLA 
branches are patent. Posterior circulation is unremarkable. The major intradural
venous sinuses are patent.  CTA neck:Great vessel origins: No occlusion or high-
grade stenosis. Carotid arteries: No occlusion or high-grade stenosis.  
Vertebral arteries: No occlusion or high-grade stenosis. Mild degenerative 
changes of the cervical spine. Multinodular thyroid; largest discrete nodule 
measures up to 2 cm and should be further evaluated by ultrasound. Mild scarring
of the visualized lung apices.    IMPRESSION:1.Multifocal infarcts in the left 
cerebral hemisphere, without hemorrhage.2.Short segment severe focal stenosis of
the right ICA at the petrous/cavernous junction. Moderate focal stenosis of the 
left communicating ICA. No intracranial arterial occlusion.3.No significant 
cervical arterial stenosis. Signed: Lalo Gray MDReport Verified Date/Time: 
2019 15:43:37   Electronically signed by: LALO GRAY MD on 
2019 03:43 PMCT, CAROTID, [id number] 15:43:00FINAL REPORT PATIENT ID:
  [id number] CLINICAL HISTORY: Stroke, follow up TECHNIQUE: Initially, noncontrast
head CT images were performed. Contiguous contrast-enhanced axial images through
the neck followed by axial images through the head with coronal and sagittal 
reformations to assess the arterial circulation. 3-D reconstructions were 
performed using a volume rendered technique separately on a workstation.  This 
exam was performed according to the departmental dose optimization program which
includes automated exposure control, adjustment of the mA and/or kV according to
the patient size, and/or use of an iterative reconstruction technique. Stenosis 
evaluation reported in compliance with NASCET criteria. COMPARISON: MRI brain 
2019, CTA head and neck 2017 FINDINGS: CTA head:Multifocal left border
zone acute infarcts. Remote left cerebellar hemisphere infarct. No acute 
intracranial hemorrhage. There is no hydrocephalus or midline shift. The skull 
is intact.  There is atherosclerosis inthe cavernous ICAs with a short segment 
severe focal stenosis of the right ICA at the petrous/cavernous junction. 
Moderate focal stenosis of the left communicating ICA. Bilateral MCA and LYLA 
branches are patent. Posterior circulation is unremarkable. The major intradural
venous sinuses are patent.  CTA neck:Great vessel origins: No occlusion or high-
grade stenosis. Carotid arteries: No occlusion or high-grade stenosis.  
Vertebral arteries: No occlusion or high-grade stenosis. Mild degenerative 
changes of the cervical spine. Multinodular thyroid; largest discrete nodule 
measures up to 2 cm and should be further evaluated by ultrasound. Mild scarring
of the visualized lung apices.    IMPRESSION:1.Multifocal infarcts in the left 
cerebral hemisphere, without hemorrhage.2.Short segment severe focal stenosis of
the right ICA at the petrous/cavernous junction. Moderate focal stenosis of the 
left communicating ICA. No intracranial arterial occlusion.3.No significant 
cervical arterial stenosis. Signed: Lalo Gray Verified Date/Time: 
2019 15:43:37   Electronically signed by: LALO GRAY MD on 
2019 03:43 PMPOCT-GLUCOSE [id number] 15:11:00





             Test Item    Value        Reference Range Interpretation Comments

 

             POC-GLUCOSE METER 223 mg/dL           H            : TESTED A

T [facility] [address]



             (BEAKER) (test code =                                        [facility],



             [address]38)                                               [id number]:



                                                                 /Techni

robbie ID



                                                                 = [id number] for Kitty Clifton



MR, BRAIN, [id number] 10:38:00FINAL REPORT PATIENT ID:   [id number] MRI Brain 
with and without contrast Clinical History: Stroke Technique: MRI of the brain 
utilizing axial T1, T2, FLAIR, GRE, DWI, sagittal T1; and postgadolinium axial, 
sagittal, and coronal T1-weighted images. Comparisons: MRI 2016 Findings: 
There is acute infarction in the left cerebral deep white matter. There are 
small acute cortical infarcts in the left parietal lobe. There is no hemorrhage.
There is a chronic encephalomalacic inferomedial left cerebellar infarct with 
hemosiderin staining.  There is no abnormal intracranial enhancement or mass. 
There is mild periventricular and subcortical white matter T2 hyperintensity, 
which is nonspecific but compatible with chronic microvascular ischemic change. 
There is mild generalized sulcal prominence withouthydrocephalus, midline shift,
or apparent mass effect. There are no extra-axial fluid collections. The 
craniocervical junction is preserved. The major intracranial flow-voids appear 
patent.  IMPRESSION: Acute infarction in the deep left cerebral white matter and
involving the left parietal cortex. No acute hemorrhage. Chronic left cerebellar
infarct with remote hemorrhage. No masslike intracranial enhancement. Signed: 
Tashia King Verified Date/Time:  2019 10:38:33 Reading 
Location: [facility] C013 Neuro Reading Room      Electronically signed by: TASHIA KING M.D. on 2019 10:38 AMHEMOGLOBIN [id number] 09:35:00





             Test Item    Value        Reference Range Interpretation Comments

 

             HEMOGLOBIN A1C (BEAKER) (test code = 11.1 %       4.3-6.1      H   

         



             368)                                                



POCT-GLUCOSE [id number] 08:08:00





             Test Item    Value        Reference Range Interpretation Comments

 

             POC-GLUCOSE METER 196 mg/dL           H            : TESTED A

T [facility] [address]



             (BEAKER) (test code =                                        [facility],



             [address]38)                                               [id number]:



                                                                 /Techni

robbie ID



                                                                 = [id number] for Eric Hernandez



POCT-GLUCOSE [id number] 07:22:00





             Test Item    Value        Reference Range Interpretation Comments

 

             POC-GLUCOSE METER 174 mg/dL           H            : TESTED A

T [facility] [address]



             (BEAKER) (test code =                                        [facility],



             [address]38)                                               [id number]:



                                                                 /Techni

robbie ID



                                                                 = [id number] for ALYSSA TAVERA



VITAMIN B12 AND [id number] 06:37:00





             Test Item    Value        Reference Range Interpretation Comments

 

             VITAMIN B12 (BEAKER) (test code = 839 pg/mL    213-816      H      

      



             774)                                                

 

             FOLATE (BEAKER) (test code = 362) 36.1 ng/mL   >=7.0               

      



TSH/FREE T4 IF [id number] 06:36:00





             Test Item    Value        Reference Range Interpretation Comments

 

             THYROID STIMULATING HORMONE 1.57 uIU/mL  0.35-4.94                 



             (BEAKER) (test code = 772)                                        



RAD, CHEST, 1 VIEW, NON [id number] 05:59:00Reason for exam:-&gt;cvaShould 
this be performed at the bedside?-&gt;YesFINAL REPORT PATIENT ID:   [id number]  
History: CVA. Comparison: 2016 Findings: A single view ofthe chest is 
submitted. The cardiac silhouette is within normal limits for size. There is 
atherosclerotic calcification of the aorta.  The lung volumes are slightly low. 
There is no focal consolidation, pneumothorax, large pleural effusion or 
evidence of overt pulmonary edema.   There is no acute bonyabnormality. Signed: 
Charles Gardner MDReport Verified Date/Time:  2019 05:59:01       Electronic
ally signed by: CHARLES GARDNER M.D. on 2019 05:59 AMC-REACTIVE PROTEIN
2019 05:23:00





             Test Item    Value        Reference Range Interpretation Comments

 

             C-REACTIVE PROTEIN (BEAKER) (test 1.16 mg/dL   0.00-0.50    H      

      



             code = 676)                                         



TROPONIN [id number] 05:16:00





             Test Item    Value        Reference Range Interpretation Comments

 

             TROPONIN I (BEAKER) (test code = 397) < ng/mL      0.00-0.03       

          



Troponin I (TnI) levels must be interpreted in the context of the presenting 
symptoms and the clinical findings. Elevated TnI levels indicate myocardial 
damage, but are not specific for ischemic heart disease. Elevated TnI levels are
seen in patients with other cardiac conditions (including myocarditis and 
congestive heart failure), and slight TnI elevations occur in patients with 
other conditions, including sepsis, renal failure, acidosis, acute neurological 
disease, and persistent tachyarrhythmia.BASIC METABOLIC [id number] 05:14:00





             Test Item    Value        Reference Range Interpretation Comments

 

             SODIUM (BEAKER) 141 meq/L    136-145                   



             (test code = 381)                                        

 

             POTASSIUM (BEAKER) 3.3 meq/L    3.5-5.1      L            Specimen 

slightly



             (test code = 379)                                        hemolyzed

 

             CHLORIDE (BEAKER) 110 meq/L           H            



             (test code = 382)                                        

 

             CO2 (BEAKER) (test 24 meq/L     22-29                     



             code = 355)                                         

 

             BLOOD UREA NITROGEN 7 mg/dL      7-21                      



             (BEAKER) (test code                                        



             = 354)                                              

 

             CREATININE (BEAKER) 0.71 mg/dL   0.57-1.25                 Specimen

 slightly



             (test code = 358)                                        hemolyzed

 

             GLUCOSE RANDOM 176 mg/dL           H            



             (BEAKER) (test code                                        



             = 652)                                              

 

             CALCIUM (BEAKER) 8.4 mg/dL    8.4-10.2                  



             (test code = 697)                                        

 

             EGFR (BEAKER) (test 82 mL/min/1.73                           ESTIMA

EMILY GFR IS



             code = 1092) sq m                                   NOT AS ACCURATE

 AS



                                                                 CREATININE



                                                                 CLEARANCE IN



                                                                 PREDICTING



                                                                 GLOMERULAR



                                                                 FILTRATION RATE

.



                                                                 ESTIMATED GFR I

S



                                                                 NOT APPLICABLE 

FOR



                                                                 DIALYSIS PATIEN

TS.



LIPID [id number] 05:14:00





             Test Item    Value        Reference Range Interpretation Comments

 

             TRIGLYCERIDES (BEAKER) 189 mg/dL                              Speci

men slightly



             (test code = 540)                                        hemolyzed

 

             CHOLESTEROL (BEAKER) 177 mg/dL                              Specime

n slightly



             (test code = 631)                                        hemolyzed

 

             HDL CHOLESTEROL (BEAKER) 42 mg/dL                               



             (test code = 976)                                        

 

             LDL CHOLESTEROL 97 mg/dL                               



             CALCULATED (BEAKER)                                        



             (test code = 633)                                        



Triglyceride Reference Range:   Low Risk         &lt;150   Borderline    150-199
  High Risk     200-499   Very High Risk  &gt;=500Cholesterol Reference Range:  
Low Risk         &lt;200   Borderline 200-239    High Risk        &gt;240HDL 
Cholesterol Reference Range:   Low Risk         &gt;=60   High Risk         
&lt;40LDL Cholesterol Reference Range:   Optimal          &lt;100   Near Optimal
 100-129   Borderline    130-159   High          160-189   Very High       
&gt;=190CBC W/PLT COUNT &amp; AUTO [id number] 04:54:00





             Test Item    Value        Reference Range Interpretation Comments

 

             WHITE BLOOD CELL COUNT (BEAKER) 9.1 K/ L     3.5-10.5              

    



             (test code = 775)                                        

 

             RED BLOOD CELL COUNT (BEAKER) 4.88 M/ L    3.93-5.22               

  



             (test code = 761)                                        

 

             HEMOGLOBIN (BEAKER) (test code = 15.0 GM/DL   11.2-15.7            

     



             410)                                                

 

             HEMATOCRIT (BEAKER) (test code = 43.3 %       34.1-44.9            

     



             411)                                                

 

             MEAN CORPUSCULAR VOLUME (BEAKER) 88.7 fL      79.4-94.8            

     



             (test code = 753)                                        

 

             MEAN CORPUSCULAR HEMOGLOBIN 30.7 pg      25.6-32.2                 



             (BEAKER) (test code = 751)                                        

 

             MEAN CORPUSCULAR HEMOGLOBIN CONC 34.6 GM/DL   32.2-35.5            

     



             (BEAKER) (test code = 752)                                        

 

             RED CELL DISTRIBUTION WIDTH 12.0 %       11.7-14.4                 



             (BEAKER) (test code = 412)                                        

 

             PLATELET COUNT (BEAKER) (test 198 K/CU MM  150-450                 

  



             code = 756)                                         

 

             MEAN PLATELET VOLUME (BEAKER) 12.7 fL      9.4-12.3     H          

  



             (test code = 754)                                        

 

             NUCLEATED RED BLOOD CELLS 0 /100 WBC   0-0                       



             (BEAKER) (test code = 413)                                        

 

             NEUTROPHILS RELATIVE PERCENT 60 %                                  

 



             (BEAKER) (test code = 429)                                        

 

             LYMPHOCYTES RELATIVE PERCENT 30 %                                  

 



             (BEAKER) (test code = 430)                                        

 

             MONOCYTES RELATIVE PERCENT 7 %                                    



             (BEAKER) (test code = 431)                                        

 

             EOSINOPHILS RELATIVE PERCENT 3 %                                   

 



             (BEAKER) (test code = 432)                                        

 

             BASOPHILS RELATIVE PERCENT 1 %                                    



             (BEAKER) (test code = 437)                                        

 

             NEUTROPHILS ABSOLUTE COUNT 5.39 K/ L    1.56-6.13                 



             (BEAKER) (test code = 670)                                        

 

             LYMPHOCYTES ABSOLUTE COUNT 2.72 K/ L    1.18-3.74                 



             (BEAKER) (test code = 414)                                        

 

             MONOCYTES ABSOLUTE COUNT (BEAKER) 0.63 K/ L    0.24-0.36    H      

      



             (test code = 415)                                        

 

             EOSINOPHILS ABSOLUTE COUNT 0.23 K/ L    0.04-0.36                 



             (BEAKER) (test code = 416)                                        

 

             BASOPHILS ABSOLUTE COUNT (BEAKER) 0.06 K/ L    0.01-0.08           

      



             (test code = 417)                                        

 

             IMMATURE GRANULOCYTES-RELATIVE 0 %          0-1                    

   



             PERCENT (BEAKER) (test code =                                      

  



             2801)                                               



PROTHROMBIN [id number] 04:20:00





             Test Item    Value        Reference Range Interpretation Comments

 

             PROTIME (BEAKER) (test code = 14.1 seconds 11.9-14.2               

  



             759)                                                

 

             INR (BEAKER) (test code = 370) 1.2          <=5.9                  

   



Effective 2019: PT Reference Range ChangeNew: 11.9-14.2  Previous: 11.7-
14.7RECOMMENDED COUMADIN/WARFARIN INR THERAPY RANGESSTANDARD DOSE: 2.0-3.0  
Includes: PROPHYLAXIS for venous thrombosis, systemic embolization; TREATMENT 
for venous thrombosis and/or pulmonary embolus.HIGH RISK: Target INR is2.5-3.5 
for patients wiht mechanical heart valves.

## 2022-04-11 LAB
ALBUMIN SERPL BCP-MCNC: 2.5 G/DL (ref 3.4–5)
BUN BLD-MCNC: 29 MG/DL (ref 7–18)
GLUCOSE SERPLBLD-MCNC: 152 MG/DL (ref 74–106)
HCT VFR BLD CALC: 43.4 % (ref 36–45)
LYMPHOCYTES # SPEC AUTO: 2.9 K/UL (ref 0.7–4.9)
MAGNESIUM SERPL-MCNC: 2.3 MG/DL (ref 1.8–2.4)
PMV BLD: 11.7 FL (ref 7.6–11.3)
POTASSIUM SERPL-SCNC: 4.2 MMOL/L (ref 3.5–5.1)
PREALB SERPL-MCNC: 23.2 MG/DL (ref 20–40)
RBC # BLD: 4.66 M/UL (ref 3.86–4.86)

## 2022-04-11 RX ADMIN — LEVOTHYROXINE SODIUM SCH MG: 0.05 TABLET ORAL at 07:36

## 2022-04-11 RX ADMIN — ATORVASTATIN CALCIUM SCH MG: 40 TABLET, FILM COATED ORAL at 20:21

## 2022-04-11 RX ADMIN — CLOPIDOGREL BISULFATE SCH MG: 75 TABLET, FILM COATED ORAL at 07:57

## 2022-04-11 RX ADMIN — GABAPENTIN SCH MG: 300 CAPSULE ORAL at 16:44

## 2022-04-11 RX ADMIN — HUMAN INSULIN SCH: 100 INJECTION, SOLUTION SUBCUTANEOUS at 16:30

## 2022-04-11 RX ADMIN — TRAMADOL HYDROCHLORIDE PRN MG: 50 TABLET, COATED ORAL at 07:57

## 2022-04-11 RX ADMIN — GABAPENTIN SCH MG: 300 CAPSULE ORAL at 07:58

## 2022-04-11 RX ADMIN — PIOGLITAZONE SCH MG: 15 TABLET ORAL at 07:57

## 2022-04-11 RX ADMIN — DILTIAZEM HYDROCHLORIDE SCH MG: 120 CAPSULE, COATED, EXTENDED RELEASE ORAL at 08:16

## 2022-04-11 RX ADMIN — LATANOPROST SCH DROP: 50 SOLUTION OPHTHALMIC at 21:03

## 2022-04-11 RX ADMIN — ASPIRIN SCH MG: 81 TABLET, COATED ORAL at 07:57

## 2022-04-11 RX ADMIN — GABAPENTIN SCH MG: 300 CAPSULE ORAL at 20:21

## 2022-04-11 RX ADMIN — HUMAN INSULIN SCH: 100 INJECTION, SOLUTION SUBCUTANEOUS at 07:30

## 2022-04-11 RX ADMIN — HUMAN INSULIN SCH UNIT: 100 INJECTION, SOLUTION SUBCUTANEOUS at 12:44

## 2022-04-11 RX ADMIN — ENOXAPARIN SODIUM SCH MG: 40 INJECTION SUBCUTANEOUS at 07:00

## 2022-04-11 RX ADMIN — GLIMEPIRIDE SCH MG: 2 TABLET ORAL at 07:58

## 2022-04-11 RX ADMIN — INSULIN GLARGINE SCH UNIT: 100 INJECTION, SOLUTION SUBCUTANEOUS at 08:13

## 2022-04-11 RX ADMIN — LOSARTAN POTASSIUM SCH MG: 50 TABLET, FILM COATED ORAL at 20:21

## 2022-04-11 RX ADMIN — LOSARTAN POTASSIUM SCH MG: 50 TABLET, FILM COATED ORAL at 07:57

## 2022-04-11 RX ADMIN — HUMAN INSULIN SCH: 100 INJECTION, SOLUTION SUBCUTANEOUS at 20:21

## 2022-04-11 RX ADMIN — ALOGLIPTIN SCH MG: 12.5 TABLET, FILM COATED ORAL at 08:14

## 2022-04-11 RX ADMIN — LATANOPROST SCH: 50 SOLUTION OPHTHALMIC at 20:40

## 2022-04-11 NOTE — RAD REPORT
EXAM DESCRIPTION:  MRI - Brain W/Wo Cont - 4/11/2022 2:43 pm

 

CLINICAL HISTORY:  To evaluate stroke progression

Headache, CVA

 

COMPARISON:  Head Brain Wo Cont dated 4/10/2022

 

TECHNIQUE:  Multi-sequence, multiplanar MR imaging of the brain was performed with contrast.

 

FINDINGS:  No intracranial hemorrhage, hydrocephalus, or extra-axial fluid collection. No edema or sh
ift of midline structures. No intracranial mass. Previously noted right acute pontine infarct has mil
dly increased in size currently measuring 21 x 15 mm.. Old infarct again noted left cerebellar hemisp
here is well as the left cerebellum.

 

The midline structures are normally formed. Mastoid air cells and paranasal sinuses are clear.

 

Post-contrast images show no abnormal enhancement to suggest tumor or infection.

 

IMPRESSION:  Mild increase right pontine infarct size since prior study.

 

No hemorrhage or pathologic enhancement evident.

## 2022-04-11 NOTE — R.HP
HISTORY AND PHYSICAL



FACILITY:



[facility]



ENCOUNTER DATE AND TIME:



04/11/2022 17:26 (CDT)



MR#:



[id number]



ACCT#:



[id number]



NAME



JOSE ORO



ADDRESS:



[address] [place] [address]



CITY:



[place]



ZIP



[id number]



PHONE:



[phone]



YOB: 1950



AGE:



71



SSN#



[id number]



GENDER:



Female



MARITAL STATUS







PRE-HOSPITAL LIVING SETTING



01 - Home (private home/apt. board/care, assisted living, group home, transitional living) 



PRE-HOSPITAL LIVING WITH



Family/Relatives 



ENCOUNTER PHYSICIAN:



Dr. Fabio Shaffer M.D.



REFERRING DOCTOR:







DATE OF ADMISSION:



04/10/2022 15:34 (CDT)



REFERRING FACILITY



[facility] 



HOME TYPE AND DETAILS:



Type of home: single family house

# of steps to enter the residence: 0

# of levels in the residence: 1

# of steps within the residence: 0



ONSET DATE:



04/03/2022



PRIMARY DIAGNOSIS-RELATED SURGERIES:



N/A



HISTORY OF PRESENT ILLNESS (HPI):



Pt. is a 72 yo Right-handed female.

On 04/03/2022 Pt. presented to [facility] with sudden onset of left-side weakness.

On 04/03/2022 she was admitted to [facility] with diagnosis STROKE.

Her impairment category is Stroke 01 -  Left Body (Right Brain) (01.1).

Pre-morbidly, Pt. was independent/mod-I in Locomotion, Safety Awareness, Social Cognition, and Balanc
e; and she had good Sphincter Control, Self-Care, Communication, and Endurance.

Currently, she has deficits of Locomotion, Safety Awareness, Balance, Social Cognition, Transfers Con
trol, Self-Care, Sphincter Control, Communication, and Endurance.

Pt. is now referred to [facility] for acute in-patient rehabilitation in order
 to maximize patient's functional independence in activities of daily living, strength, ROM, and mobi
lity.

Patient has realistic goal of being discharged at assistance level 7-Ind to reside at Home with  Fami
ly/Relatives.





MEDICATION ALLERGIES:



No Known Drug Allergies (NKDA)



ENVIRONMENTAL ALLERGIES:



- Substance Allergies

None Known

- Other Allergies

None Known



PAST MEDICAL HISTORY:



Type 2 diabetes mellitus without complications (E11.9)

HYPOTHYROIDISM

HISTORY OF STROKE

Essential (primary) hypertension (I10)

Repeated falls (R29.6)



SOCIAL HISTORY:



- Home Living

Family/Relatives



REVIEW OF SYSTEMS:



- Gen

No Chills

Fatigue

No Fever

- Eyes

No Double Vision

No itchiness

- ENMT

Difficulty Swallowing

- CVS

No Chest Discomfort

No Chest Pain

Fatigue

No Weight Gain

- Resp

No Cough

No Shortness of Breath

- GI

Continent

No Abdominal Pain

No Constipation

No Diarrhea

- 

Continent

No Kidney Pain

No Painful Urination

No Urinary Urgency

- MSK

No Joint Pain

Muscle Cramps

Stiffness

- Skin

No Itching

No Rash

No Suspicious Lesions

- Neuro

Coordination Difficulty

Difficulty with Concentration

No Memory Loss

No Seizures

Weakness

- Psych

No Anxiety

No Depression



No HIV Exposure

No Persistent Infections

No Seasonal Allergies

- Endo

No Cold/Heat Intolerance

No Excessive Hunger

No Excessive Thirst

No Excessive Urination



PHYSICAL EXAM



- Gen

Alert and awake

Lying in bed

No apparent distress

Oriented to: person, time, and place

- Skin

No breakdown



Mild left facial numbness and weakness.

- Eyes

No abnormalities

- ENMT

No abnormalities

- Neck

No abnormalities

- CVS

RRR

- Chest

No abnormalities

- Abd

Soft

- GI

Soft



Deferred

- 

No abnormalities

- Ext

No significant edema

- MSK

3+ to 4+/5 weakness in left upper and lower extremity

- Neuro

3+ to 4+/5 weakness in left upper and lower extremity

- Psych

No abnormalities



VITAL SIGNS



Temperature: 98.4 F

SBP/DBP: 140/57

Pulse: 66

Resp: 15



NURSING:



- Shower

allowing shower

- Bladder

care per protocol

- Skin

care per protocol



PRECAUTIONS:



- Weight Bearing Precaution

WBAT left LE

- Fall Precaution

SAFTY AND FALL



ACTIVITIES



OOB only with supervision



QI SCORES:



- Self-Care

A. Eating  03-Partial/moderate assistance  

B. Oral hygiene  03-Partial/moderate assistance  

C. Toileting hygiene  03-Partial/moderate assistance  

E. Shower/bathe self  02-Substantial/maximal assistance  

F. Upper body dressing  02-Substantial/maximal assistance  

G. Lower body dressing  02-Substantial/maximal assistance  

H. Putting on/taking off footwear  88-Not attempted due to medical condition or safety concerns  

- Mobility

A. Roll left and right  03-Partial/moderate assistance  

B. Sit to lying  03-Partial/moderate assistance  

C. Lying to sitting on side of bed  03-Partial/moderate assistance  

D. Sit to stand  03-Partial/moderate assistance  

E. Chair/bed-to-chair transfer  03-Partial/moderate assistance  

F. Toilet transfer  03-Partial/moderate assistance  

G. Car transfer  88-Not attempted due to medical condition or safety concerns  

I. Walk 10 feet  03-Partial/moderate assistance  

J. Walk 50 feet with two turns  03-Partial/moderate assistance  

K. Walk 150 feet  03-Partial/moderate assistance  

L. Walking 10 feet on uneven surfaces  88-Not attempted due to medical condition or safety concerns  


M. 1 step (curb)  88-Not attempted due to medical condition or safety concerns  

N. 4 steps  88-Not attempted due to medical condition or safety concerns  

O. 12 steps  88-Not attempted due to medical condition or safety concerns  

P. Picking up object  88-Not attempted due to medical condition or safety concerns  

R. Wheel 50 feet with two turns  88-Not attempted due to medical condition or safety concerns  

S. Wheel 150 feet  88-Not attempted due to medical condition or safety concerns  

- Bladder and Bowel

Bladder continence      

Bowel continence      

- Endurance

Fair

- Balance

Fair

- Safety Awareness

Fair



CURRENT FUNC. DEFICITS:



Self-Care, Mobility, Endurance, Balance, and Safety Awareness



MEDICATIONS:



- Other

See attached MAR (Medication Administration Record)



ASSESSMENT:



Pt. is a 72 yo Right-handed female.On 04/03/2022 Pt. presented to [facility] with sudden
 onset of left-side weakness.On 04/03/2022 she was admitted to [facility] with diagnosis
 STROKE.Her impairment category is Stroke 01 -  Left Body (Right Brain) (01.1).Pre-morbidly, Pt. was 
independent/mod-I in Locomotion, Safety Awareness, Social Cognition, and Balance; and she had good Sp
hincter Control, Self-Care, Communication, and Endurance.Currently, she has deficits of Locomotion, S
afety Awareness, Balance, Social Cognition, Transfers Control, Self-Care, Sphincter Control, Communic
ation, and Endurance.Pt. is now referred to [facility] for acute in-patient re
habilitation in order to maximize patient's functional independence in activities of daily living, st
rength, ROM, and mobility.- Rehab Goal

Patient has realistic goal of being discharged at assistance level 7-Ind to reside at Home with  Fami
ly/Relatives.







for Dementia, TBI, Stroke, or others

- Physical Therapy

Gait dysfunction - to improve, our physical therapists will perform initial evaluation of pt's status
 upon admission and devise an individualized program for Gait Training, and Wheel Chair mobility

Inability to transfer - to improve, our physical therapists will perform initial evaluation of pt's s
tatus upon admission and devise an individualized program for Bed mobility

Need for home safety evaluation - to improve, our physical therapists will perform initial evaluation
 of pt's status upon admission and devise an individualized program for Home Evaluation

Need in caregiver upon discharge - to improve, our physical therapists will perform initial evaluatio
n of pt's status upon admission and devise an individualized program for Caregiver Training

New precaution - to improve, our physical therapists will perform initial evaluation of pt's status u
tyesha admission and devise an individualized program for Patient precaution education

 Edema - to improve, our physical therapists will perform initial evaluation of pt's status upon admi
ssion and devise an individualized program for Elevation Training, and Lymphedema Therapy

Poor balance - to improve, our physical therapists will perform initial evaluation of pt's status upo
n admission and devise an individualized program for Balance Training

Poor endurance - to improve, our physical therapists will perform initial evaluation of pt's status u
tyesha admission and devise an individualized program for Endurance Training

Weakness - to improve, our physical therapists will perform initial evaluation of pt's status upon ad
mission and devise an individualized program for Aquatic Therapy, Neuromuscular Reeducation, and Stre
ngthening

 Achieving independence - to improve, our physical therapists will perform initial evaluation of pt's
 status upon admission and devise an individualized program for Community Reintegration Activities

- Occupational Therapy

ADL deficits - to improve, our occupation therapists will perform initial evaluation of pt's status u
tyesha admission and devise an individualized program for Bathing, Bed mobility, Community Reintegration
, Cooking, Dressing, Eating, Fine Motor Skills, Grooming, Homemaking, Kitchen Mobility, Laundry, Crystal
ent Education, Safety Awareness, Splinting - Positioning, Transfers(Toilet, Tub, Shower), and Wheel C
hair Management

Cognitive deficits - to improve, our occupation therapists will perform initial evaluation of pt's st
atus upon admission and devise an individualized program for Cognition - orientation

Need for care giver - to improve, our occupation therapists will perform initial evaluation of pt's s
tatus upon admission and devise an individualized program for Caregiver Training

Weakness - to improve, our occupation therapists will perform initial evaluation of pt's status upon 
admission and devise an individualized program for Aquatic Therapy, Balance, Endurance, UE ROM, and U
E strengthening



MEDICAL PLAN:



- Diet Type

Start Regular

- Diet - Liquid Texture

Start Regular

- Tube Feed

Start N/A

- Bladder

care per protocol

- Weight Bearing Precaution

 WBAT left LE

- Fall Precaution

 Bed alarm

 SAFTY AND FALL

 TABS alarm

 Wheel chair alarm

- Skin

care per protocol

- Other

See attached MAR (Medication Administration Record)

- Diet - Solid Texture

Regular

- Shower

 shower



DISCHARGE PLAN:



- Estimated Length of Stay (days)

17. 



- Consensus on plan

Discharge plan has been discussed with primary caregiver. Patient/Family is in agreement with the leopoldo
n. Primary caregiver is in agreement with the plan. 



- Patient/Family Goals

Return home independently. 



- Planned Living Setting Upon Discharge

Home, to live with Family/Relatives. Transitional Living. 



SIGNATURE PANEL:



Electronically signed by Dr. Fabio Shaffer M.D. on 04/11/2022 at 17:32 (CDT)

## 2022-04-11 NOTE — P.PN
After noting worsening left face, arm and leg weakness on admission to the rehab

unit on 4/10/22, a non-contrast head CT showed no hemorrhagic conversion of her 

right pontine stroke. However, her Brain MRI done today showed slight worsening 

of the stroke now measuring 21 x 15 mm. Her earlier brain MRI only reported 14 

mm stroke dimension. She is doing aggressive speech, occupational and physical 

therapy.

## 2022-04-11 NOTE — PAPE
POST ADMISSION PHYSICIAN EVALUATION



PATIENT:



[facility] 



MR#



[id number] 



ACCT#



[id number] 



REFERRING DOCTOR







EVALUATION DATE AND TIME



04/11/2022 17:32 (CDT) 



NAME



JOSE ORO



DATE OF BIRTH



1950 



AGE



71 



PHONE



[phone] 



SSN#



[id number] 



GENDER



female 



EVALUATING PHYSICIAN



Dr. Fabio Shaffer M.D. 



ADMISSION DIAGNOSIS:



STROKE



ONSET DATE



04/03/2022



POST-ADMISSION FUNCTIONAL/MEDICAL STATUS:



- Bladder

Same accident frequency: 7-Ind - No accidents in the past 7 days

- Bowel

Same accident frequency: 7-Ind - No accidents in the past 7 days

- Walking

Same score based on distance walked: 0(N/A)

Same score based on distance walked: 3(>=150ft)

- Wheelchair

Same score based on distance traveled: 0(N/A)



STATUS CHANGE EVALUATION:



No change in Functional or Medical Status is identified compared with Pre-Admission screening.



PATIENT NEEDS CLOSE MEDICAL SUPERVISION BY A REHABILITATION PHYSICIAN FOR:



Coordination of Treatment Team

Wound Care

Medical and Co-Morbidity Management

Pain Management

DVT Management

Diabetes Management



PATIENT REQUIRES 24X7 REHAB NURSING FOR MEDICAL AND FUNCTIONAL MGT. OF THE FOLLOWING DEFICITS:



Disease Management

Medication Management

Patient requires 24x7 Rehabilitation Nursing for: Pain Issues, Identifying and preventing risk factor
s, Monitoring and reporting current medical conditions, Assisting with ambulation and transfer, Jackie
ting with all ADL-s, Teaching patients about disease process and medications, Family teaching, Provid
ing safe environment, Bowel and Bladder Issues, Skin Integrity, and Medication Management

Patient/Family Education

Providing Safe Environment

Skin Integrity

Pain Management

Bowel and Bladder Management



PATIENT REQUIRES INTENSIVE, COORDINATED INTERDISCIPLINARY APPROACH TO REHAB:



Arranging Home Equipment/Services

Discharge Planning

Family Intervention/Training

Patient needs Dietary and Nutrition Services for: Adequate Nutrition, Nutritional Supplements, and Nu
tritional Education

Patient needs  and/or Case Management for: Discharge Planning, Arranging Home Equipmen
t or Services, and Family Interventions

/Case Management



LIST OF IDENTIFIED AND POTENTIAL PROBLEMS:



Alteration in leisure activities

Bladder, Incontinence

Bowel, Incontinence

Falls, Actual or Potential

Infection, Actual or Potential

Mobility Impaired

Pain, Alteration in Comfort

Self Care Deficit

Skin Integrity, Actual or Potential

Urinary Tract Infection (UTI), Actual or Potential



RISK FOR COMPLICATIONS



- Skin Breakdown

Nursing will assess skin daily using assessment tool and will place on Skin Breakdown Precautions as 
Indicated per protocol. 



- CARDIC

monitoring heart rate/signs and symptoms for cardiac distress. 



- Weakness

Regular therapeutic activity and exercise. Strengthening exercises to be performed. 



- CVA

pt's blood pressures have been inconsistent and require monitoring and medication management as inidi
cated by physician. pt has history of CVA.  Will monitor blood pressure and manage with medication. 



- Diabetic Complications

pt will receive a specialized diet to help manage blood glucose levels. Regular monitoring and manage
ment of blood glucose levels. 



- Dysphagia

SLP will work on patient's ability to swallow. 



- Pneumonia

pt will be instructed on use of and be encouraged to use incentive spirometry. Interval Chest X-Rays 
will be obtained as necessary. SLP will provide therapeutic services to improve pt's ability to safel
y swallow. 



INTERVENTIONS



- A-Fib

monitor for complications. Vitals will be monitored regularly and medications administered as indicat
ed by Physician. 



- CHF

monitoring of patient symptoms and medication management by physician. Daily weights will be obtained
. Regular assessment of patient vitals. 



- Diabetes

Monitor blood glucose levels and administer medication as indicated by Physician. Diet will be custom
ized to manage diabetic needs. 



- HTN

Blood pressure will be regularly assessed and medications administered as per physician recommendatio
ns. 



PATIENT COULD BE AT RISK FOR COMPLICATIONS FROM ADVERSE MEDICAL CONDITIONS DUE TO HIS/HER COMORBIDITI
ES AND THE RIGORS OF THE INTENSIVE REHABILLITATION PROGRAM. METHODS OR INTERVENTIONS TO AVOID COMPLIC
ATIONS INCLUDE:



- Deep Vein Thrombosis (DVT)

Prophylaxis therapy for prevention _________________________. Sequential Compression Device (SCD). TE
D Hose. 



- Bleeding

Assess lab values and manage abnormalities. Nursing to teach precautions for anti-coagulation therapy
. Stroke patients assessed for lethargy or change in status. Wound to be assessed every shift. 



- Infection

Clinical staff to assess and manage the signs and symptoms of infection including fever, redness, war
mth, etc. 



- Urinary Tract Infection





- Aspiration

Clinical staff will assess and manage coughing, drooling, congestion. 



- Falls

Patient will be evaluated for Fall Precautions and will be placed on Fall Precautions as indicated pe
r protocol. 



- Skin Breakdown

Nursing will assess skin daily using assessment tool and will place on Skin Breakdown Precautions as 
indicated per protocol. 



- Pain

Clinical staff may employ non-medication methods such as massage, distraction, decrease stimulus, etc
. as needed. Clinical staff will assess patient's pain level every shift per protocol to assess and e
nsure pain management effectiveness. Medications will be given and the pain level re-assessed. 



PRELIMINARY PLAN OF CARE:



- Physical Therapy

Patient needs Physical Therapy for a daily minimum of 1.5 hours at least 5 out of 7 days, to improve:
 Mobility, Strengthening, Transfers, Stretching, ROM, Endurance, Ability to manage stairs, Gait, and 
Balance. 



- Speech Therapy

Patient needs Speech Therapy for a daily minimum of 0.5 hours at least 5 out of 7 days, to improve: S
wallowing, Cognition, Language Skills, and Compensatory Strategies. 



- Rehabilitation Nursing

Patient requires 24x7 Rehabilitation Nursing for: Pain Issues, Identifying and preventing risk factor
s, Monitoring and reporting current medical conditions, Assisting with ambulation and transfer, Jackie
ting with all ADL-s, Teaching patients about disease process and medications, Family teaching, Provid
ing safe environment, Bowel and Bladder Issues, Skin Integrity, and Medication Management. 





Patient needs  and/or Case Management for: Discharge Planning, Arranging Home Equipmen
t or Services, and Family Interventions. 



- Dietary and Nutrition Services

Patient needs Dietary and Nutrition Services for: Adequate Nutrition, Nutritional Supplements, and Nu
tritional Education. 



- Occupational Therapy

Patient needs Occupational Therapy for a daily minimum of 1.5 hours at least 5 out of 7 days, to impr
ove Activities of Daily Living, including: Eating, Grooming, Bathing, Dressing, Toileting, Toilet Tra
nsfers, Community Reintegration, Higher functional activities, Adaptive Equipment, Splinting, Househo
ld Tasks, and Other activities as determined. 



QI SCORES:



- Self-Care

A. Eating  03-Partial/moderate assistance  

B. Oral hygiene  03-Partial/moderate assistance  

C. Toileting hygiene  03-Partial/moderate assistance  

E. Shower/bathe self  02-Substantial/maximal assistance  

F. Upper body dressing  02-Substantial/maximal assistance  

G. Lower body dressing  02-Substantial/maximal assistance  

H. Putting on/taking off footwear  88-Not attempted due to medical condition or safety concerns  

- Mobility

A. Roll left and right  03-Partial/moderate assistance  

B. Sit to lying  03-Partial/moderate assistance  

C. Lying to sitting on side of bed  03-Partial/moderate assistance  

D. Sit to stand  03-Partial/moderate assistance  

E. Chair/bed-to-chair transfer  03-Partial/moderate assistance  

F. Toilet transfer  03-Partial/moderate assistance  

G. Car transfer  88-Not attempted due to medical condition or safety concerns  

I. Walk 10 feet  03-Partial/moderate assistance  

J. Walk 50 feet with two turns  03-Partial/moderate assistance  

K. Walk 150 feet  03-Partial/moderate assistance  

L. Walking 10 feet on uneven surfaces  88-Not attempted due to medical condition or safety concerns  


M. 1 step (curb)  88-Not attempted due to medical condition or safety concerns  

N. 4 steps  88-Not attempted due to medical condition or safety concerns  

O. 12 steps  88-Not attempted due to medical condition or safety concerns  

P. Picking up object  88-Not attempted due to medical condition or safety concerns  

R. Wheel 50 feet with two turns  88-Not attempted due to medical condition or safety concerns  

S. Wheel 150 feet  88-Not attempted due to medical condition or safety concerns  

- Bladder and Bowel

Bladder continence      

Bowel continence      

- Endurance

Fair

- Balance

Fair

- Safety Awareness

Fair



POTENTIAL FUNCTIONAL GOALS FOR PATIENT TO ACHIEVE BY DISCHARGE:



- Safety Precaution

Patient will remain free from falls or injury at time of discharge. 



- Bed Mobility

Patient will perform bed mobility at 4-Kaylee level of assistance. 



- Transfers

Patient will complete transfers from bed to chair at 4-Kaylee level of assistance. 



- Mobility

Patient will ambulate 150 ft with 4-Kaylee level of assistance with RW. 



PATIENT REHAB POTENTIAL



SEE ORO is able and expected to receive 3 hours of individualized therapy daily on at least 5 of mateo
ry 7 days

SEE HELMSs prognosis for significant practical improvement within a reasonable period of time appears
 Good

Expected level of measurable improvement will be of a practical value to SEE ORO's functional capaci
ty or adaptations to impairments

Has a viable Discharge Plan

Medically appropriate; condition is sufficiently stable to participate in intensive rehab program



DISCHARGE PLAN:



- Estimated Length of Stay (days)

17. 



- Consensus on plan

Discharge plan has been discussed with primary caregiver. Patient/Family is in agreement with the leopoldo
n. Primary caregiver is in agreement with the plan. 



- Patient/Family Goals

Return home independently. 



- Planned Living Setting Upon Discharge

Home, to live with Family/Relatives. Transitional Living. 



CONCLUSION ON REHABILITATION NECESSITY:



I have evaluated patient's pre-admission functional status and, comparing it to the patient's post-ad
mission functional status now, I conclude that the pre-admission assessment was accurate. Patient's c
ondition on admission supports the medical necessity of admission to IR[facility]. It is safe to proceed with 
patient's therapy program.



SIGNATURE PANEL:



Electronically signed by Dr. Fabio Shaffer M.D. on 04/11/2022 at 17:33 (CDT)

## 2022-04-12 RX ADMIN — DILTIAZEM HYDROCHLORIDE SCH: 120 CAPSULE, COATED, EXTENDED RELEASE ORAL at 08:00

## 2022-04-12 RX ADMIN — GABAPENTIN SCH MG: 300 CAPSULE ORAL at 21:01

## 2022-04-12 RX ADMIN — HUMAN INSULIN SCH: 100 INJECTION, SOLUTION SUBCUTANEOUS at 07:30

## 2022-04-12 RX ADMIN — HUMAN INSULIN SCH UNIT: 100 INJECTION, SOLUTION SUBCUTANEOUS at 21:01

## 2022-04-12 RX ADMIN — ENOXAPARIN SODIUM SCH MG: 40 INJECTION SUBCUTANEOUS at 09:17

## 2022-04-12 RX ADMIN — LOSARTAN POTASSIUM SCH MG: 50 TABLET, FILM COATED ORAL at 21:00

## 2022-04-12 RX ADMIN — GLIMEPIRIDE SCH MG: 2 TABLET ORAL at 09:14

## 2022-04-12 RX ADMIN — LEVOTHYROXINE SODIUM SCH MG: 0.05 TABLET ORAL at 09:12

## 2022-04-12 RX ADMIN — ATORVASTATIN CALCIUM SCH MG: 40 TABLET, FILM COATED ORAL at 21:01

## 2022-04-12 RX ADMIN — ASPIRIN SCH MG: 81 TABLET, COATED ORAL at 09:13

## 2022-04-12 RX ADMIN — CLOPIDOGREL BISULFATE SCH MG: 75 TABLET, FILM COATED ORAL at 09:12

## 2022-04-12 RX ADMIN — FEXOFENADINE HYDROCHLORIDE PRN MG: 180 TABLET, FILM COATED ORAL at 22:07

## 2022-04-12 RX ADMIN — HUMAN INSULIN SCH UNIT: 100 INJECTION, SOLUTION SUBCUTANEOUS at 12:46

## 2022-04-12 RX ADMIN — GABAPENTIN SCH MG: 300 CAPSULE ORAL at 14:20

## 2022-04-12 RX ADMIN — LATANOPROST SCH DROP: 50 SOLUTION OPHTHALMIC at 21:00

## 2022-04-12 RX ADMIN — HUMAN INSULIN SCH UNIT: 100 INJECTION, SOLUTION SUBCUTANEOUS at 17:51

## 2022-04-12 RX ADMIN — LEVOTHYROXINE SODIUM SCH: 0.05 TABLET ORAL at 06:30

## 2022-04-12 RX ADMIN — PIOGLITAZONE SCH MG: 15 TABLET ORAL at 09:11

## 2022-04-12 RX ADMIN — GABAPENTIN SCH MG: 300 CAPSULE ORAL at 09:13

## 2022-04-12 RX ADMIN — ALOGLIPTIN SCH MG: 12.5 TABLET, FILM COATED ORAL at 09:15

## 2022-04-12 RX ADMIN — LOSARTAN POTASSIUM SCH MG: 50 TABLET, FILM COATED ORAL at 09:14

## 2022-04-12 RX ADMIN — INSULIN GLARGINE SCH UNIT: 100 INJECTION, SOLUTION SUBCUTANEOUS at 09:20

## 2022-04-12 NOTE — R.PN
PROGRESS NOTES



ENCOUNTER DATE AND TIME:



04/12/2022 17:55 (CDT)



NAME



JOSE ORO



YOB: 1950



DATE OF ADMISSION:



04/10/2022 15:34 (CDT)



STROKECHIEF COMPLAINT:



Stroke with left face, arm and leg weakness



SUBJECTIVE:



Pt denied any depression.

Pt denied any Shortness of Breath.

CBC with differential is normal. Glucose 114 to 261, prealbumin 23.2, UA is negative.

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
.

Therapeutic exercises done with standby assistance.

Did oral motor exercises with speech therapy.



VITAL SIGNS



Temperature: 98.2 F

SBP/DBP: 147/74

Pulse: 66

Resp: 16



MEDICATION ALLERGIES:



No Known Drug Allergies (NKDA)



ENVIRONMENTAL ALLERGIES:



- Substance Allergies

None Known

- Other Allergies

None Known



NURSING:



- Shower

allowing shower

- Bladder

care per protocol

- Skin

care per protocol



PRECAUTIONS:



- Weight Bearing Precaution

WBAT left LE

- Fall Precaution

SAFTY AND FALL



ACTIVITIES



OOB only with supervision



THERAPIES:



- Dietary and Nutrition

Adequate Nutrition. Nutritional Education. Nutritional Supplements. Evaluate and Treat. 



- Occupational Therapy

Cognitive Retraining. Patient needs Occupational Therapy for a daily minimum of 1.5 hours at least 5 
out of 7 days, to improve Activities of Daily Living, including: Eating, Grooming, Bathing, Dressing,
 Toileting, Toilet Transfers, Community Reintegration, Higher functional activities, Adaptive Equipme
nt, Splinting, Household Tasks, and Other activities as determined. Visual Perceptual Training. Evalu
ate and Treat. Transfer Training. Safety Awareness. Patient/Family Education. ADL Training. Household
 Tasks. Eating. 



- Speech Therapy

Cognitive Training. Expressive Language Skills. Memory Strategies. Patient needs Speech Therapy for a
 daily minimum of 1.5 hours at least 5 out of 7 days, to improve: Swallowing, Cognition, Language Ski
lls, and Compensatory Strategies. Receptive Language Skills. Speech Intelligibility Training. Evaluat
e and Treat. 



- Physical Therapy

Patient needs Physical Therapy for a daily minimum of 1.5 hours at least 5 out of 7 days, to improve:
 Mobility, Strengthening, Transfers, Stretching, ROM, Endurance, Ability to manage stairs, Gait, and 
Balance. Mobility Training. Safety Awareness. Gait Training. Balance Training. Transfer Training. Apoorva chicasate and Treat. Patient/Family Education. LE Strengthening. 



PHYSICAL EXAM



- Gen

Alert and awake

Lying in bed

No apparent distress

Oriented to: person, time, and place

- Skin

No breakdown



Mild left facial numbness and weakness.

- Eyes

No abnormalities

- ENMT

No abnormalities

- Neck

No abnormalities

- CVS

RRR

- Chest

No abnormalities

- Abd

Soft

- GI

Soft



Deferred

- 

No abnormalities

- Ext

No significant edema

- MSK

3+ to 4+/5 weakness in left upper and lower extremity

- Neuro

3+ to 4+/5 weakness in left upper and lower extremity

- Psych

No abnormalities



ASSESSMENT:



Pt. is a 70 yo Right-handed female.On 04/03/2022 Pt. presented to [facility] with sudden
 onset of left-side weakness.On 04/03/2022 she was admitted to [facility] with diagnosis
 STROKE.Her impairment category is Stroke 01 -  Left Body (Right Brain) (01.1).Pre-morbidly, Pt. was 
independent/mod-I in Locomotion, Safety Awareness, Social Cognition, and Balance; and she had good Sp
hincter Control, Self-Care, Communication, and Endurance.Currently, she has deficits of Locomotion, S
afety Awareness, Balance, Social Cognition, Transfers Control, Self-Care, Sphincter Control, Communic
ation, and Endurance.Pt. is now referred to [facility] for acute in-patient re
habilitation in order to maximize patient's functional independence in activities of daily living, st
rength, ROM, and mobility.- Rehab Goal

Patient has realistic goal of being discharged at assistance level 7-Ind to reside at Home with  Fami
ly/Relatives.

MDM/PLAN:



- Physical Therapy

 Gait dysfunction - to improve, our physical therapists will perform initial evaluation of pt's statu
s upon admission and devise an individualized program for Gait Training, and Wheel Chair mobility

 Inability to transfer - to improve, our physical therapists will perform initial evaluation of pt's 
status upon admission and devise an individualized program for Bed mobility

 Need for home safety evaluation - to improve, our physical therapists will perform initial evaluatio
n of pt's status upon admission and devise an individualized program for Home Evaluation

 Need in caregiver upon discharge - to improve, our physical therapists will perform initial evaluati
on of pt's status upon admission and devise an individualized program for Caregiver Training

 New precaution - to improve, our physical therapists will perform initial evaluation of pt's status 
upon admission and devise an individualized program for Patient precaution education

Edema - to improve, our physical therapists will perform initial evaluation of pt's status upon admis
chula and devise an individualized program for Elevation Training, and Lymphedema Therapy

 Poor balance - to improve, our physical therapists will perform initial evaluation of pt's status up
on admission and devise an individualized program for Balance Training

 Poor endurance - to improve, our physical therapists will perform initial evaluation of pt's status 
upon admission and devise an individualized program for Endurance Training

 Weakness - to improve, our physical therapists will perform initial evaluation of pt's status upon a
dmission and devise an individualized program for Aquatic Therapy, Neuromuscular Reeducation, and Str
engthening

Achieving independence - to improve, our physical therapists will perform initial evaluation of pt's 
status upon admission and devise an individualized program for Community Reintegration Activities

- Occupational Therapy

 ADL deficits - to improve, our occupation therapists will perform initial evaluation of pt's status 
upon admission and devise an individualized program for Bathing, Bed mobility, Community Reintegratio
n, Cooking, Dressing, Eating, Fine Motor Skills, Grooming, Homemaking, Kitchen Mobility, Laundry, Pat
ient Education, Safety Awareness, Splinting - Positioning, Transfers(Toilet, Tub, Shower), and Wheel 
Chair Management

 Cognitive deficits - to improve, our occupation therapists will perform initial evaluation of pt's s
tatus upon admission and devise an individualized program for Cognition - orientation

 Need for care giver - to improve, our occupation therapists will perform initial evaluation of pt's 
status upon admission and devise an individualized program for Caregiver Training

 Weakness - to improve, our occupation therapists will perform initial evaluation of pt's status upon
 admission and devise an individualized program for Aquatic Therapy, Balance, Endurance, UE ROM, and 
UE strengthening

- Other

 See attached MAR (Medication Administration Record)

- Diet Type

Continue Regular

- Diet - Liquid Texture

Continue Regular

- Tube Feed

Continue N/A

- Bladder

 care per protocol

- Weight Bearing Precaution

WBAT left LE

- Fall Precaution

Bed alarm

SAFTY AND FALL

TABS alarm

Wheel chair alarm

- Skin

 care per protocol

- Diet - Solid Texture

Continue Regular

- Shower

 allowing shower



 for Dementia, TBI, Stroke, or others



FUNCTIONAL STATUS: UPDATED AT WEEKLY TEAM CONFERENCE



- Bladder

Same accident frequency: 7-Ind - No accidents in the past 7 days

- Bowel

Same accident frequency: 7-Ind - No accidents in the past 7 days

- Walking

Same score based on distance walked: 0(N/A)

Same score based on distance walked: 3(>=150ft)

- Wheelchair

Same score based on distance traveled: 0(N/A)



FUNCTIONAL STATUS:



- Self-Care

A. Eating    sup   

B. Grooming    sup   

C. Bathing    Kaylee   

D. Dressing - Upper     Kaylee   

E. Dressing - Lower     modA   

F. Toileting    Kaylee   

- Sphincter Control

G. Bladder control     Freddy   

H. Bowel control     Freddy   

- Transfers Control

I. Bed/Chair/Wheelchair     Kaylee   

J. Toilet    Kaylee   

K. Tub/Shower    modA   

- Locomotion

L. Walk/Wheelchair (B)     Kaylee   

M. Stairs    Kaylee   

- Communication

N. Comprehension (B)     Kaylee   

O. Expression (B)     Kaylee   

- Social Cognition

P. Social Interaction    Freddy   

Q. Problem Solving    sup   

R. Memory    sup   

- Endurance

Fair

- Balance

Fair

- Safety Awareness

Fair



QI SCORES:



- Self-Care

A. Eating  03-Partial/moderate assistance  

B. Oral hygiene  03-Partial/moderate assistance  

C. Toileting hygiene  03-Partial/moderate assistance  

E. Shower/bathe self  02-Substantial/maximal assistance  

F. Upper body dressing  02-Substantial/maximal assistance  

G. Lower body dressing  02-Substantial/maximal assistance  

H. Putting on/taking off footwear  88-Not attempted due to medical condition or safety concerns  

- Mobility

A. Roll left and right  03-Partial/moderate assistance  

B. Sit to lying  03-Partial/moderate assistance  

C. Lying to sitting on side of bed  03-Partial/moderate assistance  

D. Sit to stand  03-Partial/moderate assistance  

E. Chair/bed-to-chair transfer  03-Partial/moderate assistance  

F. Toilet transfer  03-Partial/moderate assistance  

G. Car transfer  88-Not attempted due to medical condition or safety concerns  

I. Walk 10 feet  03-Partial/moderate assistance  

J. Walk 50 feet with two turns  03-Partial/moderate assistance  

K. Walk 150 feet  03-Partial/moderate assistance  

L. Walking 10 feet on uneven surfaces  88-Not attempted due to medical condition or safety concerns  


M. 1 step (curb)  88-Not attempted due to medical condition or safety concerns  

N. 4 steps  88-Not attempted due to medical condition or safety concerns  

O. 12 steps  88-Not attempted due to medical condition or safety concerns  

P. Picking up object  88-Not attempted due to medical condition or safety concerns  

R. Wheel 50 feet with two turns  88-Not attempted due to medical condition or safety concerns  

S. Wheel 150 feet  88-Not attempted due to medical condition or safety concerns  

- Bladder and Bowel

Bladder continence      

Bowel continence      

- Endurance

Fair

- Balance

Fair

- Safety Awareness

Fair



CURRENT Count includes the [facility]. DEFICITS:



Self-Care, Mobility, Endurance, Balance, and Safety Awareness



SIGNATURE PANEL:



Electronically signed by Dr. Fabio Shaffer M.D. on 04/12/2022 at 18:03 (CDT)

## 2022-04-13 RX ADMIN — ATORVASTATIN CALCIUM SCH MG: 40 TABLET, FILM COATED ORAL at 21:07

## 2022-04-13 RX ADMIN — DILTIAZEM HYDROCHLORIDE SCH MG: 120 CAPSULE, COATED, EXTENDED RELEASE ORAL at 08:06

## 2022-04-13 RX ADMIN — HUMAN INSULIN SCH UNIT: 100 INJECTION, SOLUTION SUBCUTANEOUS at 16:40

## 2022-04-13 RX ADMIN — LOSARTAN POTASSIUM SCH MG: 50 TABLET, FILM COATED ORAL at 08:08

## 2022-04-13 RX ADMIN — ALOGLIPTIN SCH MG: 12.5 TABLET, FILM COATED ORAL at 08:07

## 2022-04-13 RX ADMIN — INSULIN GLARGINE SCH UNIT: 100 INJECTION, SOLUTION SUBCUTANEOUS at 08:06

## 2022-04-13 RX ADMIN — ACETAMINOPHEN PRN MG: 500 TABLET, FILM COATED ORAL at 00:07

## 2022-04-13 RX ADMIN — GLIMEPIRIDE SCH MG: 2 TABLET ORAL at 08:07

## 2022-04-13 RX ADMIN — LEVOTHYROXINE SODIUM SCH MG: 0.05 TABLET ORAL at 07:00

## 2022-04-13 RX ADMIN — ASPIRIN SCH MG: 81 TABLET, COATED ORAL at 08:07

## 2022-04-13 RX ADMIN — TRAMADOL HYDROCHLORIDE PRN MG: 50 TABLET, COATED ORAL at 07:36

## 2022-04-13 RX ADMIN — HUMAN INSULIN SCH UNIT: 100 INJECTION, SOLUTION SUBCUTANEOUS at 20:42

## 2022-04-13 RX ADMIN — LOSARTAN POTASSIUM SCH MG: 50 TABLET, FILM COATED ORAL at 20:45

## 2022-04-13 RX ADMIN — GABAPENTIN SCH MG: 300 CAPSULE ORAL at 13:02

## 2022-04-13 RX ADMIN — GABAPENTIN SCH MG: 300 CAPSULE ORAL at 20:42

## 2022-04-13 RX ADMIN — GABAPENTIN SCH MG: 300 CAPSULE ORAL at 07:38

## 2022-04-13 RX ADMIN — LATANOPROST SCH DROP: 50 SOLUTION OPHTHALMIC at 20:45

## 2022-04-13 RX ADMIN — PIOGLITAZONE SCH MG: 15 TABLET ORAL at 08:08

## 2022-04-13 RX ADMIN — ENOXAPARIN SODIUM SCH MG: 40 INJECTION SUBCUTANEOUS at 07:35

## 2022-04-13 RX ADMIN — HUMAN INSULIN SCH: 100 INJECTION, SOLUTION SUBCUTANEOUS at 07:30

## 2022-04-13 RX ADMIN — CLOPIDOGREL BISULFATE SCH MG: 75 TABLET, FILM COATED ORAL at 08:07

## 2022-04-13 RX ADMIN — HUMAN INSULIN SCH: 100 INJECTION, SOLUTION SUBCUTANEOUS at 11:30

## 2022-04-13 NOTE — R.PN
PROGRESS NOTES



ENCOUNTER DATE AND TIME:



04/13/2022 18:05 (CDT)



NAME



JSOE ORO



YOB: 1950



DATE OF ADMISSION:



04/10/2022 15:34 (CDT)



STROKECHIEF COMPLAINT:



Stroke with left face, arm and leg weakness



SUBJECTIVE:



Pt denied any depression.

Pt denied any Shortness of Breath.

CBC with differential is normal. Glucose 103 to 214, prealbumin 23.2, UA is negative.

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
.

Ambulated 58' with minimum assistance using a rolling walker.

Did oral motor exercises with speech therapy.



VITAL SIGNS



Temperature: 97.6 F

SBP/DBP: 147/67

Pulse: 74

Resp: 16



MEDICATION ALLERGIES:



No Known Drug Allergies (NKDA)



ENVIRONMENTAL ALLERGIES:



- Substance Allergies

None Known

- Other Allergies

None Known



NURSING:



- Shower

allowing shower

- Bladder

care per protocol

- Skin

care per protocol



PRECAUTIONS:



- Weight Bearing Precaution

WBAT left LE

- Fall Precaution

SAFTY AND FALL



ACTIVITIES



OOB only with supervision



THERAPIES:



- Dietary and Nutrition

Adequate Nutrition. Nutritional Education. Nutritional Supplements. Evaluate and Treat. 



- Occupational Therapy

Cognitive Retraining. Patient needs Occupational Therapy for a daily minimum of 1.5 hours at least 5 
out of 7 days, to improve Activities of Daily Living, including: Eating, Grooming, Bathing, Dressing,
 Toileting, Toilet Transfers, Community Reintegration, Higher functional activities, Adaptive Equipme
nt, Splinting, Household Tasks, and Other activities as determined. Visual Perceptual Training. Evalu
ate and Treat. Transfer Training. Safety Awareness. Patient/Family Education. ADL Training. Household
 Tasks. Eating. 



- Speech Therapy

Cognitive Training. Expressive Language Skills. Memory Strategies. Patient needs Speech Therapy for a
 daily minimum of 1.5 hours at least 5 out of 7 days, to improve: Swallowing, Cognition, Language Ski
lls, and Compensatory Strategies. Receptive Language Skills. Speech Intelligibility Training. Evaluat
e and Treat. 



- Physical Therapy

Patient needs Physical Therapy for a daily minimum of 1.5 hours at least 5 out of 7 days, to improve:
 Mobility, Strengthening, Transfers, Stretching, ROM, Endurance, Ability to manage stairs, Gait, and 
Balance. Mobility Training. Safety Awareness. Gait Training. Balance Training. Transfer Training. Apoorva chicasate and Treat. Patient/Family Education. LE Strengthening. 



PHYSICAL EXAM



- Gen

Alert and awake

Lying in bed

No apparent distress

Oriented to: person, time, and place

- Skin

No breakdown



Mild left facial numbness and weakness.

- Eyes

No abnormalities

- ENMT

No abnormalities

- Neck

No abnormalities

- CVS

RRR

- Chest

No abnormalities

- Abd

Soft

- GI

Soft



Deferred

- 

No abnormalities

- Ext

No significant edema

- MSK

3+ to 4+/5 weakness in left upper and lower extremity

- Neuro

3+ to 4+/5 weakness in left upper and lower extremity

- Psych

No abnormalities



ASSESSMENT:



Pt. is a 72 yo Right-handed female.On 04/03/2022 Pt. presented to [facility] with sudden
 onset of left-side weakness.On 04/03/2022 she was admitted to [facility] with diagnosis
 STROKE.Her impairment category is Stroke 01 -  Left Body (Right Brain) (01.1).Pre-morbidly, Pt. was 
independent/mod-I in Locomotion, Safety Awareness, Social Cognition, and Balance; and she had good Sp
hincter Control, Self-Care, Communication, and Endurance.Currently, she has deficits of Locomotion, S
afety Awareness, Balance, Social Cognition, Transfers Control, Self-Care, Sphincter Control, Communic
ation, and Endurance.Pt. is now referred to [facility] for acute in-patient re
habilitation in order to maximize patient's functional independence in activities of daily living, st
rength, ROM, and mobility.- Rehab Goal

Patient has realistic goal of being discharged at assistance level 7-Ind to reside at Home with  Fami
ly/Relatives.

MDM/PLAN:



- Physical Therapy

 Gait dysfunction - to improve, our physical therapists will perform initial evaluation of pt's statu
s upon admission and devise an individualized program for Gait Training, and Wheel Chair mobility

 Inability to transfer - to improve, our physical therapists will perform initial evaluation of pt's 
status upon admission and devise an individualized program for Bed mobility

 Need for home safety evaluation - to improve, our physical therapists will perform initial evaluatio
n of pt's status upon admission and devise an individualized program for Home Evaluation

 Need in caregiver upon discharge - to improve, our physical therapists will perform initial evaluati
on of pt's status upon admission and devise an individualized program for Caregiver Training

 New precaution - to improve, our physical therapists will perform initial evaluation of pt's status 
upon admission and devise an individualized program for Patient precaution education

 Edema - to improve, our physical therapists will perform initial evaluation of pt's status upon admi
ssion and devise an individualized program for Elevation Training, and Lymphedema Therapy

 Poor balance - to improve, our physical therapists will perform initial evaluation of pt's status up
on admission and devise an individualized program for Balance Training

 Poor endurance - to improve, our physical therapists will perform initial evaluation of pt's status 
upon admission and devise an individualized program for Endurance Training

 Weakness - to improve, our physical therapists will perform initial evaluation of pt's status upon a
dmission and devise an individualized program for Aquatic Therapy, Neuromuscular Reeducation, and Str
engthening

 Achieving independence - to improve, our physical therapists will perform initial evaluation of pt's
 status upon admission and devise an individualized program for Community Reintegration Activities

- Occupational Therapy

 ADL deficits - to improve, our occupation therapists will perform initial evaluation of pt's status 
upon admission and devise an individualized program for Bathing, Bed mobility, Community Reintegratio
n, Cooking, Dressing, Eating, Fine Motor Skills, Grooming, Homemaking, Kitchen Mobility, Laundry, Pat
ient Education, Safety Awareness, Splinting - Positioning, Transfers(Toilet, Tub, Shower), and Wheel 
Chair Management

 Cognitive deficits - to improve, our occupation therapists will perform initial evaluation of pt's s
tatus upon admission and devise an individualized program for Cognition - orientation

 Need for care giver - to improve, our occupation therapists will perform initial evaluation of pt's 
status upon admission and devise an individualized program for Caregiver Training

 Weakness - to improve, our occupation therapists will perform initial evaluation of pt's status upon
 admission and devise an individualized program for Aquatic Therapy, Balance, Endurance, UE ROM, and 
UE strengthening

- Other

 See attached MAR (Medication Administration Record)

- Diet Type

Continue Regular

- Diet - Liquid Texture

Continue Regular

- Tube Feed

Continue N/A

- Bladder

 care per protocol

- Weight Bearing Precaution

 WBAT left LE

- Fall Precaution

 Bed alarm

 SAFTY AND FALL

 TABS alarm

 Wheel chair alarm

- Skin

 care per protocol

- Diet - Solid Texture

Continue Regular

- Shower

 allowing shower



 for Dementia, TBI, Stroke, or others



FUNCTIONAL STATUS: UPDATED AT WEEKLY TEAM CONFERENCE



- Bladder

Same accident frequency: 7-Ind - No accidents in the past 7 days

- Bowel

Same accident frequency: 7-Ind - No accidents in the past 7 days

- Walking

Same score based on distance walked: 0(N/A)

Same score based on distance walked: 3(>=150ft)

- Wheelchair

Same score based on distance traveled: 0(N/A)



FUNCTIONAL STATUS:



- Self-Care

A. Eating    sup   

B. Grooming    sup   

C. Bathing    Kaylee   

D. Dressing - Upper     Kaylee   

E. Dressing - Lower     modA   

F. Toileting    Kaylee   

- Sphincter Control

G. Bladder control     Freddy   

H. Bowel control     Freddy   

- Transfers Control

I. Bed/Chair/Wheelchair     Kaylee   

J. Toilet    Kaylee   

K. Tub/Shower    modA   

- Locomotion

L. Walk/Wheelchair (B)     Kaylee   

M. Stairs    Kaylee   

- Communication

N. Comprehension (B)     Kaylee   

O. Expression (B)     Kaylee   

- Social Cognition

P. Social Interaction    Freddy   

Q. Problem Solving    sup   

R. Memory    sup   

- Endurance

Fair

- Balance

Fair

- Safety Awareness

Fair



QI SCORES:



- Self-Care

A. Eating  03-Partial/moderate assistance  

B. Oral hygiene  03-Partial/moderate assistance  

C. Toileting hygiene  03-Partial/moderate assistance  

E. Shower/bathe self  02-Substantial/maximal assistance  

F. Upper body dressing  02-Substantial/maximal assistance  

G. Lower body dressing  02-Substantial/maximal assistance  

H. Putting on/taking off footwear  88-Not attempted due to medical condition or safety concerns  

- Mobility

A. Roll left and right  03-Partial/moderate assistance  

B. Sit to lying  03-Partial/moderate assistance  

C. Lying to sitting on side of bed  03-Partial/moderate assistance  

D. Sit to stand  03-Partial/moderate assistance  

E. Chair/bed-to-chair transfer  03-Partial/moderate assistance  

F. Toilet transfer  03-Partial/moderate assistance  

G. Car transfer  88-Not attempted due to medical condition or safety concerns  

I. Walk 10 feet  03-Partial/moderate assistance  

J. Walk 50 feet with two turns  03-Partial/moderate assistance  

K. Walk 150 feet  03-Partial/moderate assistance  

L. Walking 10 feet on uneven surfaces  88-Not attempted due to medical condition or safety concerns  


M. 1 step (curb)  88-Not attempted due to medical condition or safety concerns  

N. 4 steps  88-Not attempted due to medical condition or safety concerns  

O. 12 steps  88-Not attempted due to medical condition or safety concerns  

P. Picking up object  88-Not attempted due to medical condition or safety concerns  

R. Wheel 50 feet with two turns  88-Not attempted due to medical condition or safety concerns  

S. Wheel 150 feet  88-Not attempted due to medical condition or safety concerns  

- Bladder and Bowel

Bladder continence      

Bowel continence      

- Endurance

Fair

- Balance

Fair

- Safety Awareness

Fair



CURRENT [facility]. DEFICITS:



Self-Care, Mobility, Endurance, Balance, and Safety Awareness



SIGNATURE PANEL:



Electronically signed by Dr. Fabio Shaffer M.D. on 04/13/2022 at 18:08 (CDT)

## 2022-04-14 LAB
ALBUMIN SERPL BCP-MCNC: 2.2 G/DL (ref 3.4–5)
BUN BLD-MCNC: 26 MG/DL (ref 7–18)
GLUCOSE SERPLBLD-MCNC: 73 MG/DL (ref 74–106)
HCT VFR BLD CALC: 39.2 % (ref 36–45)
LYMPHOCYTES # SPEC AUTO: 1.9 K/UL (ref 0.7–4.9)
MAGNESIUM SERPL-MCNC: 2.3 MG/DL (ref 1.8–2.4)
PMV BLD: 11.4 FL (ref 7.6–11.3)
POTASSIUM SERPL-SCNC: 4.2 MMOL/L (ref 3.5–5.1)
PREALB SERPL-MCNC: 17.6 MG/DL (ref 20–40)
RBC # BLD: 4.23 M/UL (ref 3.86–4.86)

## 2022-04-14 RX ADMIN — GABAPENTIN SCH MG: 300 CAPSULE ORAL at 20:02

## 2022-04-14 RX ADMIN — LOSARTAN POTASSIUM SCH MG: 50 TABLET, FILM COATED ORAL at 20:02

## 2022-04-14 RX ADMIN — TRAMADOL HYDROCHLORIDE PRN MG: 50 TABLET, COATED ORAL at 07:37

## 2022-04-14 RX ADMIN — HUMAN INSULIN SCH: 100 INJECTION, SOLUTION SUBCUTANEOUS at 16:30

## 2022-04-14 RX ADMIN — CLOPIDOGREL BISULFATE SCH MG: 75 TABLET, FILM COATED ORAL at 08:21

## 2022-04-14 RX ADMIN — HUMAN INSULIN SCH UNIT: 100 INJECTION, SOLUTION SUBCUTANEOUS at 11:54

## 2022-04-14 RX ADMIN — ENOXAPARIN SODIUM SCH MG: 40 INJECTION SUBCUTANEOUS at 07:37

## 2022-04-14 RX ADMIN — INSULIN GLARGINE SCH: 100 INJECTION, SOLUTION SUBCUTANEOUS at 08:00

## 2022-04-14 RX ADMIN — GABAPENTIN SCH MG: 300 CAPSULE ORAL at 13:17

## 2022-04-14 RX ADMIN — DILTIAZEM HYDROCHLORIDE SCH MG: 120 CAPSULE, COATED, EXTENDED RELEASE ORAL at 08:22

## 2022-04-14 RX ADMIN — HUMAN INSULIN SCH: 100 INJECTION, SOLUTION SUBCUTANEOUS at 07:30

## 2022-04-14 RX ADMIN — PIOGLITAZONE SCH MG: 15 TABLET ORAL at 08:21

## 2022-04-14 RX ADMIN — GLIMEPIRIDE SCH MG: 2 TABLET ORAL at 08:21

## 2022-04-14 RX ADMIN — GABAPENTIN SCH MG: 300 CAPSULE ORAL at 07:38

## 2022-04-14 RX ADMIN — ASPIRIN SCH MG: 81 TABLET, COATED ORAL at 08:20

## 2022-04-14 RX ADMIN — ATORVASTATIN CALCIUM SCH MG: 40 TABLET, FILM COATED ORAL at 20:02

## 2022-04-14 RX ADMIN — LEVOTHYROXINE SODIUM SCH MG: 0.05 TABLET ORAL at 05:07

## 2022-04-14 RX ADMIN — INSULIN GLARGINE SCH UNIT: 100 INJECTION, SOLUTION SUBCUTANEOUS at 11:54

## 2022-04-14 RX ADMIN — TRAMADOL HYDROCHLORIDE PRN MG: 50 TABLET, COATED ORAL at 13:17

## 2022-04-14 RX ADMIN — LATANOPROST SCH DROP: 50 SOLUTION OPHTHALMIC at 20:02

## 2022-04-14 RX ADMIN — HUMAN INSULIN SCH UNIT: 100 INJECTION, SOLUTION SUBCUTANEOUS at 20:02

## 2022-04-14 RX ADMIN — LOSARTAN POTASSIUM SCH MG: 50 TABLET, FILM COATED ORAL at 08:21

## 2022-04-14 RX ADMIN — FEXOFENADINE HYDROCHLORIDE PRN MG: 180 TABLET, FILM COATED ORAL at 09:23

## 2022-04-14 RX ADMIN — ALOGLIPTIN SCH MG: 12.5 TABLET, FILM COATED ORAL at 08:23

## 2022-04-14 NOTE — P.RH.PN
Estimated Length of Stay: 18


Expected Discharge Date: 04/28/22


Discharge Disposition Plan: Home


Family Support: Yes


Long Term Goal: Mobility, Transfers, Self Care


Vital Signs: 


                                Last Vital Signs











Temp  98 F   04/14/22 07:38


 


Pulse  68   04/14/22 08:22


 


Resp  16   04/14/22 07:38


 


BP  142/63 H  04/14/22 08:22


 


Pulse Ox  97   04/14/22 07:38











Laboratory: 


                             Laboratory Last Values











WBC  6.3 K/uL (4.3-10.9)  D 04/14/22  04:11    


 


RBC  4.23 M/uL (3.86-4.86)   04/14/22  04:11    


 


Hgb  13.1 g/dL (12.0-15.0)   04/14/22  04:11    


 


Hct  39.2 % (36.0-45.0)   04/14/22  04:11    


 


MCV  92.7 fL ()   04/14/22  04:11    


 


MCH  31.0 pg (27.0-35.0)   04/14/22  04:11    


 


MCHC  33.4 g/dL (32.0-36.0)   04/14/22  04:11    


 


RDW  13.6 % (12.1-15.2)   04/14/22  04:11    


 


Plt Count  157 K/uL (152-406)   04/14/22  04:11    


 


MPV  11.4 fL (7.6-11.3)  H  04/14/22  04:11    


 


Neutrophils %  54.4 % (41.7-73.7)   04/14/22  04:11    


 


Lymphocytes %  30.9 % (15.3-44.8)   04/14/22  04:11    


 


Monocytes %  11.1 % (3.3-12.3)   04/14/22  04:11    


 


Eosinophils %  2.4 % (0-4.4)   04/14/22  04:11    


 


Basophils %  1.2 % (0-1.3)   04/14/22  04:11    


 


Absolute Neutrophils  3.4 K/uL (1.8-8.0)   04/14/22  04:11    


 


Absolute Lymphocytes  1.9 K/uL (0.7-4.9)   04/14/22  04:11    


 


Absolute Monocytes  0.7 K/uL (0.1-1.3)   04/14/22  04:11    


 


Absolute Eosinophils  0.1 K/uL (0-0.5)   04/14/22  04:11    


 


Absolute Basophils  0.1 K/uL (0-0.5)   04/14/22  04:11    


 


Sodium  147 mmol/L (136-145)  H  04/14/22  04:11    


 


Potassium  4.2 mmol/L (3.5-5.1)   04/14/22  04:11    


 


Chloride  119 mmol/L ()  H  04/14/22  04:11    


 


Carbon Dioxide  23 mmol/L (21-32)   04/14/22  04:11    


 


BUN  26 mg/dL (7-18)  H  04/14/22  04:11    


 


Creatinine  0.95 mg/dL (0.55-1.3)   04/14/22  04:11    


 


Estimated GFR  58 mL/min (=/>90)  L  04/14/22  04:11    


 


Glucose  73 mg/dL ()  L  04/14/22  04:11    


 


POC Glucose  80 mg/dL ()   04/14/22  08:10    


 


Calcium  8.5 mg/dL (8.5-10.1)   04/14/22  04:11    


 


Magnesium  2.3 mg/dL (1.8-2.4)   04/14/22  04:11    


 


Albumin  2.2 g/dL (3.4-5.0)  L  04/14/22  04:11    


 


Prealbumin  17.6 mg/dL (20-40)  L  04/14/22  04:11    


 


Urine Color  Yellow  (Yellow)   04/10/22  18:20    


 


Urine Appearance  Clear  (Clear)   04/10/22  18:20    


 


Urine pH  5.5  (5.0-7.0)   04/10/22  18:20    


 


Ur Specific Gravity  1.015  (1.005-1.030)   04/10/22  18:20    


 


Glucose (UA)(Auto)  1+  (Negative)  H  04/10/22  18:20    


 


Urine Ketones  Negative  (Negative)   04/10/22  18:20    


 


Urine Blood  Negative  (Negative)   04/10/22  18:20    


 


Urine Nitrite  Negative  (Negative)   04/10/22  18:20    


 


Urine Bilirubin  Negative  (Negative)   04/10/22  18:20    


 


Urine Urobilinogen  0.2 mg/dL (0.2-1.0)   04/10/22  18:20    


 


Ur Leukocyte Esterase  Negative  (Negative)   04/10/22  18:20    


 


Urine RBC  None seen /HPF (NONE SEEN)   04/10/22  18:20    


 


Urine WBC  <5 /HPF (<5)   04/10/22  18:20    


 


Ur Squamous Epith Cells  <5 /HPF (NONE SEEN)   04/10/22  18:20    


 


Urine Bacteria  <20 /HPF (<20)   04/10/22  18:20    


 


Hyaline Casts  5-10 /LPF (NONE SEEN)   04/10/22  18:20    


 


Urine Culture Reflexed  Not needed   04/10/22  18:20    


 


Urine Total Protein  Negative  (Negative)   04/10/22  18:20    











Weight: 195 lb 4.8 oz


Wound Present: No


Physician Update: Walker 20' with min assistance. The left leg mckayla and her 

left hand in 3/5 strengty. Min assistance for sit to stand transfers. Min assist

with shower, dressing. Max assistance with toileting. Labs reviewed and are 

stable.


Comment: no skin breakdown.


Summary: Patient's care plan and long term goals have been reviewed and revised 

as necessary. Please see the Rehabilitation Signature page for all necessary 

signatures.

## 2022-04-15 RX ADMIN — PIOGLITAZONE SCH MG: 15 TABLET ORAL at 07:50

## 2022-04-15 RX ADMIN — MELATONIN PRN MG: 3 TAB ORAL at 19:56

## 2022-04-15 RX ADMIN — LOSARTAN POTASSIUM SCH MG: 50 TABLET, FILM COATED ORAL at 07:51

## 2022-04-15 RX ADMIN — ATORVASTATIN CALCIUM SCH MG: 40 TABLET, FILM COATED ORAL at 19:55

## 2022-04-15 RX ADMIN — GABAPENTIN SCH MG: 300 CAPSULE ORAL at 13:34

## 2022-04-15 RX ADMIN — INSULIN GLARGINE SCH UNIT: 100 INJECTION, SOLUTION SUBCUTANEOUS at 08:20

## 2022-04-15 RX ADMIN — DILTIAZEM HYDROCHLORIDE SCH MG: 120 CAPSULE, COATED, EXTENDED RELEASE ORAL at 07:51

## 2022-04-15 RX ADMIN — TRAMADOL HYDROCHLORIDE PRN MG: 50 TABLET, COATED ORAL at 07:10

## 2022-04-15 RX ADMIN — HUMAN INSULIN SCH UNIT: 100 INJECTION, SOLUTION SUBCUTANEOUS at 23:33

## 2022-04-15 RX ADMIN — HUMAN INSULIN SCH UNIT: 100 INJECTION, SOLUTION SUBCUTANEOUS at 17:07

## 2022-04-15 RX ADMIN — TRAMADOL HYDROCHLORIDE PRN MG: 50 TABLET, COATED ORAL at 13:35

## 2022-04-15 RX ADMIN — HUMAN INSULIN SCH: 100 INJECTION, SOLUTION SUBCUTANEOUS at 11:30

## 2022-04-15 RX ADMIN — GABAPENTIN SCH MG: 300 CAPSULE ORAL at 19:55

## 2022-04-15 RX ADMIN — SENNOSIDES AND DOCUSATE SODIUM PRN TAB: 8.6; 5 TABLET ORAL at 19:56

## 2022-04-15 RX ADMIN — GABAPENTIN SCH MG: 300 CAPSULE ORAL at 07:10

## 2022-04-15 RX ADMIN — DULOXETINE SCH MG: 20 CAPSULE, DELAYED RELEASE ORAL at 07:53

## 2022-04-15 RX ADMIN — LEVOTHYROXINE SODIUM SCH MG: 0.05 TABLET ORAL at 05:21

## 2022-04-15 RX ADMIN — ASPIRIN SCH MG: 81 TABLET, COATED ORAL at 07:50

## 2022-04-15 RX ADMIN — LATANOPROST SCH DROP: 50 SOLUTION OPHTHALMIC at 19:56

## 2022-04-15 RX ADMIN — CLOPIDOGREL BISULFATE SCH MG: 75 TABLET, FILM COATED ORAL at 07:50

## 2022-04-15 RX ADMIN — ENOXAPARIN SODIUM SCH MG: 40 INJECTION SUBCUTANEOUS at 07:09

## 2022-04-15 RX ADMIN — GLIMEPIRIDE SCH MG: 2 TABLET ORAL at 07:51

## 2022-04-15 RX ADMIN — HUMAN INSULIN SCH: 100 INJECTION, SOLUTION SUBCUTANEOUS at 07:30

## 2022-04-15 RX ADMIN — LOSARTAN POTASSIUM SCH MG: 50 TABLET, FILM COATED ORAL at 19:56

## 2022-04-15 RX ADMIN — ALOGLIPTIN SCH MG: 12.5 TABLET, FILM COATED ORAL at 07:51

## 2022-04-16 RX ADMIN — GABAPENTIN SCH MG: 300 CAPSULE ORAL at 20:46

## 2022-04-16 RX ADMIN — ENOXAPARIN SODIUM SCH: 40 INJECTION SUBCUTANEOUS at 07:02

## 2022-04-16 RX ADMIN — GLIMEPIRIDE SCH MG: 2 TABLET ORAL at 06:56

## 2022-04-16 RX ADMIN — HUMAN INSULIN SCH: 100 INJECTION, SOLUTION SUBCUTANEOUS at 07:30

## 2022-04-16 RX ADMIN — LOSARTAN POTASSIUM SCH MG: 50 TABLET, FILM COATED ORAL at 20:47

## 2022-04-16 RX ADMIN — ENOXAPARIN SODIUM SCH MG: 40 INJECTION SUBCUTANEOUS at 06:56

## 2022-04-16 RX ADMIN — LATANOPROST SCH DROP: 50 SOLUTION OPHTHALMIC at 21:26

## 2022-04-16 RX ADMIN — INSULIN GLARGINE SCH UNIT: 100 INJECTION, SOLUTION SUBCUTANEOUS at 06:56

## 2022-04-16 RX ADMIN — HUMAN INSULIN SCH UNIT: 100 INJECTION, SOLUTION SUBCUTANEOUS at 20:48

## 2022-04-16 RX ADMIN — HUMAN INSULIN SCH: 100 INJECTION, SOLUTION SUBCUTANEOUS at 11:30

## 2022-04-16 RX ADMIN — CLOPIDOGREL BISULFATE SCH MG: 75 TABLET, FILM COATED ORAL at 06:55

## 2022-04-16 RX ADMIN — ASPIRIN SCH MG: 81 TABLET, COATED ORAL at 06:54

## 2022-04-16 RX ADMIN — ALOGLIPTIN SCH MG: 12.5 TABLET, FILM COATED ORAL at 06:54

## 2022-04-16 RX ADMIN — ATORVASTATIN CALCIUM SCH MG: 40 TABLET, FILM COATED ORAL at 20:47

## 2022-04-16 RX ADMIN — DILTIAZEM HYDROCHLORIDE SCH MG: 120 CAPSULE, COATED, EXTENDED RELEASE ORAL at 07:06

## 2022-04-16 RX ADMIN — MELATONIN PRN MG: 3 TAB ORAL at 20:52

## 2022-04-16 RX ADMIN — FEXOFENADINE HYDROCHLORIDE PRN MG: 180 TABLET, FILM COATED ORAL at 21:08

## 2022-04-16 RX ADMIN — HUMAN INSULIN SCH: 100 INJECTION, SOLUTION SUBCUTANEOUS at 16:30

## 2022-04-16 RX ADMIN — LEVOTHYROXINE SODIUM SCH MG: 0.05 TABLET ORAL at 06:55

## 2022-04-16 RX ADMIN — GABAPENTIN SCH MG: 300 CAPSULE ORAL at 06:56

## 2022-04-16 RX ADMIN — DULOXETINE SCH MG: 20 CAPSULE, DELAYED RELEASE ORAL at 06:55

## 2022-04-16 RX ADMIN — GABAPENTIN SCH MG: 300 CAPSULE ORAL at 13:42

## 2022-04-16 RX ADMIN — LOSARTAN POTASSIUM SCH MG: 50 TABLET, FILM COATED ORAL at 07:06

## 2022-04-16 RX ADMIN — PIOGLITAZONE SCH MG: 15 TABLET ORAL at 06:54

## 2022-04-17 RX ADMIN — ASPIRIN SCH MG: 81 TABLET, COATED ORAL at 08:00

## 2022-04-17 RX ADMIN — GLIMEPIRIDE SCH MG: 2 TABLET ORAL at 08:00

## 2022-04-17 RX ADMIN — GABAPENTIN SCH MG: 300 CAPSULE ORAL at 14:00

## 2022-04-17 RX ADMIN — DULOXETINE SCH MG: 20 CAPSULE, DELAYED RELEASE ORAL at 08:00

## 2022-04-17 RX ADMIN — HUMAN INSULIN SCH UNIT: 100 INJECTION, SOLUTION SUBCUTANEOUS at 19:58

## 2022-04-17 RX ADMIN — DILTIAZEM HYDROCHLORIDE SCH MG: 120 CAPSULE, COATED, EXTENDED RELEASE ORAL at 08:00

## 2022-04-17 RX ADMIN — INSULIN GLARGINE SCH UNIT: 100 INJECTION, SOLUTION SUBCUTANEOUS at 08:00

## 2022-04-17 RX ADMIN — LEVOTHYROXINE SODIUM SCH MG: 0.05 TABLET ORAL at 06:30

## 2022-04-17 RX ADMIN — CLOPIDOGREL BISULFATE SCH MG: 75 TABLET, FILM COATED ORAL at 08:00

## 2022-04-17 RX ADMIN — LOSARTAN POTASSIUM SCH MG: 50 TABLET, FILM COATED ORAL at 19:58

## 2022-04-17 RX ADMIN — PIOGLITAZONE SCH MG: 15 TABLET ORAL at 08:00

## 2022-04-17 RX ADMIN — GABAPENTIN SCH MG: 300 CAPSULE ORAL at 19:57

## 2022-04-17 RX ADMIN — LATANOPROST SCH DROP: 50 SOLUTION OPHTHALMIC at 19:57

## 2022-04-17 RX ADMIN — HUMAN INSULIN SCH: 100 INJECTION, SOLUTION SUBCUTANEOUS at 07:30

## 2022-04-17 RX ADMIN — ATORVASTATIN CALCIUM SCH MG: 40 TABLET, FILM COATED ORAL at 19:57

## 2022-04-17 RX ADMIN — HUMAN INSULIN SCH: 100 INJECTION, SOLUTION SUBCUTANEOUS at 11:26

## 2022-04-17 RX ADMIN — GABAPENTIN SCH MG: 300 CAPSULE ORAL at 08:44

## 2022-04-17 RX ADMIN — ALOGLIPTIN SCH MG: 12.5 TABLET, FILM COATED ORAL at 08:00

## 2022-04-17 RX ADMIN — LOSARTAN POTASSIUM SCH MG: 50 TABLET, FILM COATED ORAL at 08:00

## 2022-04-17 RX ADMIN — FEXOFENADINE HYDROCHLORIDE PRN MG: 180 TABLET, FILM COATED ORAL at 18:57

## 2022-04-17 RX ADMIN — HUMAN INSULIN SCH: 100 INJECTION, SOLUTION SUBCUTANEOUS at 16:08

## 2022-04-17 RX ADMIN — ENOXAPARIN SODIUM SCH MG: 40 INJECTION SUBCUTANEOUS at 08:00

## 2022-04-18 RX ADMIN — ALOGLIPTIN SCH MG: 12.5 TABLET, FILM COATED ORAL at 07:52

## 2022-04-18 RX ADMIN — LOSARTAN POTASSIUM SCH MG: 50 TABLET, FILM COATED ORAL at 19:44

## 2022-04-18 RX ADMIN — HUMAN INSULIN SCH: 100 INJECTION, SOLUTION SUBCUTANEOUS at 19:42

## 2022-04-18 RX ADMIN — TRAMADOL HYDROCHLORIDE PRN MG: 50 TABLET, COATED ORAL at 13:15

## 2022-04-18 RX ADMIN — GABAPENTIN SCH MG: 300 CAPSULE ORAL at 07:16

## 2022-04-18 RX ADMIN — PIOGLITAZONE SCH MG: 15 TABLET ORAL at 07:50

## 2022-04-18 RX ADMIN — GABAPENTIN SCH MG: 300 CAPSULE ORAL at 13:15

## 2022-04-18 RX ADMIN — CLOPIDOGREL BISULFATE SCH MG: 75 TABLET, FILM COATED ORAL at 07:51

## 2022-04-18 RX ADMIN — DILTIAZEM HYDROCHLORIDE SCH MG: 120 CAPSULE, COATED, EXTENDED RELEASE ORAL at 07:51

## 2022-04-18 RX ADMIN — TRAMADOL HYDROCHLORIDE PRN MG: 50 TABLET, COATED ORAL at 07:16

## 2022-04-18 RX ADMIN — Medication SCH MG: at 19:43

## 2022-04-18 RX ADMIN — INSULIN GLARGINE SCH UNIT: 100 INJECTION, SOLUTION SUBCUTANEOUS at 12:07

## 2022-04-18 RX ADMIN — HUMAN INSULIN SCH: 100 INJECTION, SOLUTION SUBCUTANEOUS at 11:30

## 2022-04-18 RX ADMIN — LATANOPROST SCH DROP: 50 SOLUTION OPHTHALMIC at 19:45

## 2022-04-18 RX ADMIN — ENOXAPARIN SODIUM SCH MG: 40 INJECTION SUBCUTANEOUS at 07:13

## 2022-04-18 RX ADMIN — DULOXETINE SCH MG: 20 CAPSULE, DELAYED RELEASE ORAL at 07:51

## 2022-04-18 RX ADMIN — HUMAN INSULIN SCH: 100 INJECTION, SOLUTION SUBCUTANEOUS at 07:30

## 2022-04-18 RX ADMIN — LEVOTHYROXINE SODIUM SCH MG: 0.05 TABLET ORAL at 05:07

## 2022-04-18 RX ADMIN — TRAMADOL HYDROCHLORIDE PRN MG: 50 TABLET, COATED ORAL at 19:43

## 2022-04-18 RX ADMIN — ATORVASTATIN CALCIUM SCH MG: 40 TABLET, FILM COATED ORAL at 19:43

## 2022-04-18 RX ADMIN — HUMAN INSULIN SCH: 100 INJECTION, SOLUTION SUBCUTANEOUS at 16:30

## 2022-04-18 RX ADMIN — FEXOFENADINE HYDROCHLORIDE PRN MG: 180 TABLET, FILM COATED ORAL at 19:44

## 2022-04-18 RX ADMIN — ASPIRIN SCH MG: 81 TABLET, COATED ORAL at 07:50

## 2022-04-18 RX ADMIN — ACETAMINOPHEN PRN MG: 500 TABLET, FILM COATED ORAL at 04:02

## 2022-04-18 RX ADMIN — INSULIN GLARGINE SCH: 100 INJECTION, SOLUTION SUBCUTANEOUS at 08:00

## 2022-04-18 RX ADMIN — APIXABAN SCH MG: 2.5 TABLET, FILM COATED ORAL at 19:44

## 2022-04-18 RX ADMIN — Medication SCH PATCH: at 09:43

## 2022-04-18 RX ADMIN — GABAPENTIN SCH MG: 300 CAPSULE ORAL at 19:44

## 2022-04-18 RX ADMIN — GLIMEPIRIDE SCH MG: 2 TABLET ORAL at 07:51

## 2022-04-18 RX ADMIN — LOSARTAN POTASSIUM SCH MG: 50 TABLET, FILM COATED ORAL at 07:51

## 2022-04-18 NOTE — R.PN
PROGRESS NOTES



ENCOUNTER DATE AND TIME:



04/18/2022 17:31 (CDT)



NAME



JOSE ORO



YOB: 1950



DATE OF ADMISSION:



04/10/2022 15:34 (CDT)



STROKECHIEF COMPLAINT:



Stroke with left face, arm and leg weakness



SUBJECTIVE:



Pt denied any depression.

Pt denied any Shortness of Breath.

CBC with differential is normal. Glucose 90 to 121, prealbumin 17.6, UA is negative.

Ambulated 248' with minimum assistance using a rolling walker.

Did oral motor exercises with speech therapy.

After having increased urinary frequency, UA shows nitrite +ve, estrase +ve, >50 bacteria, > 50 WBC.



VITAL SIGNS



Temperature: 98.9 F

SBP/DBP: 161/66

Pulse: 73

Resp: 16



MEDICATION ALLERGIES:



No Known Drug Allergies (NKDA)



ENVIRONMENTAL ALLERGIES:



- Substance Allergies

None Known

- Other Allergies

None Known



NURSING:



- Shower

allowing shower

- Bladder

care per protocol

- Skin

care per protocol



PRECAUTIONS:



- Weight Bearing Precaution

WBAT left LE

- Fall Precaution

SAFTY AND FALL



ACTIVITIES



OOB only with supervision



THERAPIES:



- Dietary and Nutrition

Adequate Nutrition. Nutritional Education. Nutritional Supplements. Evaluate and Treat. 



- Occupational Therapy

Cognitive Retraining. Patient needs Occupational Therapy for a daily minimum of 1.5 hours at least 5 
out of 7 days, to improve Activities of Daily Living, including: Eating, Grooming, Bathing, Dressing,
 Toileting, Toilet Transfers, Community Reintegration, Higher functional activities, Adaptive Equipme
nt, Splinting, Household Tasks, and Other activities as determined. Visual Perceptual Training. Evalu
ate and Treat. Transfer Training. Safety Awareness. Patient/Family Education. ADL Training. Household
 Tasks. Eating. 



- Speech Therapy

Cognitive Training. Expressive Language Skills. Memory Strategies. Patient needs Speech Therapy for a
 daily minimum of 1.5 hours at least 5 out of 7 days, to improve: Swallowing, Cognition, Language Ski
lls, and Compensatory Strategies. Receptive Language Skills. Speech Intelligibility Training. Evaluat
e and Treat. 



- Physical Therapy

Patient needs Physical Therapy for a daily minimum of 1.5 hours at least 5 out of 7 days, to improve:
 Mobility, Strengthening, Transfers, Stretching, ROM, Endurance, Ability to manage stairs, Gait, and 
Balance. Mobility Training. Safety Awareness. Gait Training. Balance Training. Transfer Training. Apoorva rodriguez and Treat. Patient/Family Education. LE Strengthening. 



PHYSICAL EXAM



- Gen

Alert and awake

Lying in bed

No apparent distress

Oriented to: person, time, and place

- Skin

No breakdown



Mild left facial numbness and weakness.

- Eyes

No abnormalities

- ENMT

No abnormalities

- Neck

No abnormalities

- CVS

RRR

- Chest

No abnormalities

- Abd

Soft

- GI

Soft



Deferred

- 

No abnormalities

- Ext

No significant edema

- MSK

3+ to 4+/5 weakness in left upper and lower extremity

- Neuro

3+ to 4+/5 weakness in left upper and lower extremity

- Psych

No abnormalities



ASSESSMENT:



Pt. is a 72 yo Right-handed female.On 04/03/2022 Pt. presented to [facility] with sudden
 onset of left-side weakness.On 04/03/2022 she was admitted to [facility] with diagnosis
 STROKE.Her impairment category is Stroke 01 -  Left Body (Right Brain) (01.1).Pre-morbidly, Pt. was 
independent/mod-I in Locomotion, Safety Awareness, Social Cognition, and Balance; and she had good Sp
hincter Control, Self-Care, Communication, and Endurance.Currently, she has deficits of Locomotion, S
afety Awareness, Balance, Social Cognition, Transfers Control, Self-Care, Sphincter Control, Communic
ation, and Endurance.Pt. is now referred to [facility] for acute in-patient re
habilitation in order to maximize patient's functional independence in activities of daily living, st
rength, ROM, and mobility.- Rehab Goal

Patient has realistic goal of being discharged at assistance level 7-Ind to reside at Home with  Fami
ly/Relatives.

MDM/PLAN:



- Physical Therapy

 Gait dysfunction - to improve, our physical therapists will perform initial evaluation of pt's statu
s upon admission and devise an individualized program for Gait Training, and Wheel Chair mobility

 Inability to transfer - to improve, our physical therapists will perform initial evaluation of pt's 
status upon admission and devise an individualized program for Bed mobility

 Need for home safety evaluation - to improve, our physical therapists will perform initial evaluatio
n of pt's status upon admission and devise an individualized program for Home Evaluation

 Need in caregiver upon discharge - to improve, our physical therapists will perform initial evaluati
on of pt's status upon admission and devise an individualized program for Caregiver Training

 New precaution - to improve, our physical therapists will perform initial evaluation of pt's status 
upon admission and devise an individualized program for Patient precaution education

 Edema - to improve, our physical therapists will perform initial evaluation of pt's status upon admi
ssion and devise an individualized program for Elevation Training, and Lymphedema Therapy

 Poor balance - to improve, our physical therapists will perform initial evaluation of pt's status up
on admission and devise an individualized program for Balance Training

 Poor endurance - to improve, our physical therapists will perform initial evaluation of pt's status 
upon admission and devise an individualized program for Endurance Training

 Weakness - to improve, our physical therapists will perform initial evaluation of pt's status upon a
dmission and devise an individualized program for Aquatic Therapy, Neuromuscular Reeducation, and Str
engthening

 Achieving independence - to improve, our physical therapists will perform initial evaluation of pt's
 status upon admission and devise an individualized program for Community Reintegration Activities

- Occupational Therapy

 ADL deficits - to improve, our occupation therapists will perform initial evaluation of pt's status 
upon admission and devise an individualized program for Bathing, Bed mobility, Community Reintegratio
n, Cooking, Dressing, Eating, Fine Motor Skills, Grooming, Homemaking, Kitchen Mobility, Laundry, Pat
ient Education, Safety Awareness, Splinting - Positioning, Transfers(Toilet, Tub, Shower), and Wheel 
Chair Management

 Cognitive deficits - to improve, our occupation therapists will perform initial evaluation of pt's s
tatus upon admission and devise an individualized program for Cognition - orientation

 Need for care giver - to improve, our occupation therapists will perform initial evaluation of pt's 
status upon admission and devise an individualized program for Caregiver Training

 Weakness - to improve, our occupation therapists will perform initial evaluation of pt's status upon
 admission and devise an individualized program for Aquatic Therapy, Balance, Endurance, UE ROM, and 
UE strengthening

- Other

 See attached MAR (Medication Administration Record)

- Diet Type

Continue Regular

- Diet - Liquid Texture

Continue Regular

- Tube Feed

Continue N/A

- Bladder

 care per protocol

- Weight Bearing Precaution

 WBAT left LE

- Fall Precaution

 Bed alarm

 SAFTY AND FALL

 TABS alarm

 Wheel chair alarm

- Skin

 care per protocol

- Diet - Solid Texture

Continue Regular

- Shower

 allowing shower



 for Dementia, TBI, Stroke, or others



FUNCTIONAL STATUS: UPDATED AT WEEKLY TEAM CONFERENCE



- Bladder

Same accident frequency: 7-Ind - No accidents in the past 7 days

- Bowel

Same accident frequency: 7-Ind - No accidents in the past 7 days

- Walking

Same score based on distance walked: 0(N/A)

Same score based on distance walked: 3(>=150ft)

- Wheelchair

Same score based on distance traveled: 0(N/A)



FUNCTIONAL STATUS:



- Self-Care

A. Eating    sup   

B. Grooming    sup   

C. Bathing    Kaylee   

D. Dressing - Upper     Kaylee   

E. Dressing - Lower     modA   

F. Toileting    Kaylee   

- Sphincter Control

G. Bladder control     Freddy   

H. Bowel control     Freddy   

- Transfers Control

I. Bed/Chair/Wheelchair     Kaylee   

J. Toilet    Kaylee   

K. Tub/Shower    modA   

- Locomotion

L. Walk/Wheelchair (B)     Kaylee   

M. Stairs    Kaylee   

- Communication

N. Comprehension (B)     Kaylee   

O. Expression (B)     Kaylee   

- Social Cognition

P. Social Interaction    Freddy   

Q. Problem Solving    sup   

R. Memory    sup   

- Endurance

Fair

- Balance

Fair

- Safety Awareness

Fair



QI SCORES:



- Self-Care

A. Eating  03-Partial/moderate assistance  

B. Oral hygiene  03-Partial/moderate assistance  

C. Toileting hygiene  03-Partial/moderate assistance  

E. Shower/bathe self  02-Substantial/maximal assistance  

F. Upper body dressing  02-Substantial/maximal assistance  

G. Lower body dressing  02-Substantial/maximal assistance  

H. Putting on/taking off footwear  88-Not attempted due to medical condition or safety concerns  

- Mobility

A. Roll left and right  03-Partial/moderate assistance  

B. Sit to lying  03-Partial/moderate assistance  

C. Lying to sitting on side of bed  03-Partial/moderate assistance  

D. Sit to stand  03-Partial/moderate assistance  

E. Chair/bed-to-chair transfer  03-Partial/moderate assistance  

F. Toilet transfer  03-Partial/moderate assistance  

G. Car transfer  88-Not attempted due to medical condition or safety concerns  

I. Walk 10 feet  03-Partial/moderate assistance  

J. Walk 50 feet with two turns  03-Partial/moderate assistance  

K. Walk 150 feet  03-Partial/moderate assistance  

L. Walking 10 feet on uneven surfaces  88-Not attempted due to medical condition or safety concerns  


M. 1 step (curb)  88-Not attempted due to medical condition or safety concerns  

N. 4 steps  88-Not attempted due to medical condition or safety concerns  

O. 12 steps  88-Not attempted due to medical condition or safety concerns  

P. Picking up object  88-Not attempted due to medical condition or safety concerns  

R. Wheel 50 feet with two turns  88-Not attempted due to medical condition or safety concerns  

S. Wheel 150 feet  88-Not attempted due to medical condition or safety concerns  

- Bladder and Bowel

Bladder continence      

Bowel continence      

- Endurance

Fair

- Balance

Fair

- Safety Awareness

Fair



CURRENT [facility]. DEFICITS:



Self-Care, Mobility, Endurance, Balance, and Safety Awareness



SIGNATURE PANEL:



Electronically signed by Dr. Fabio Shaffer M.D. on 04/18/2022 at 17:35 (CDT)

## 2022-04-19 RX ADMIN — ALOGLIPTIN SCH MG: 12.5 TABLET, FILM COATED ORAL at 07:37

## 2022-04-19 RX ADMIN — GLIMEPIRIDE SCH MG: 2 TABLET ORAL at 07:37

## 2022-04-19 RX ADMIN — Medication SCH MG: at 19:51

## 2022-04-19 RX ADMIN — HUMAN INSULIN SCH UNIT: 100 INJECTION, SOLUTION SUBCUTANEOUS at 19:52

## 2022-04-19 RX ADMIN — ASPIRIN SCH MG: 81 TABLET, COATED ORAL at 07:38

## 2022-04-19 RX ADMIN — GABAPENTIN SCH MG: 300 CAPSULE ORAL at 07:00

## 2022-04-19 RX ADMIN — TRAMADOL HYDROCHLORIDE PRN MG: 50 TABLET, COATED ORAL at 07:00

## 2022-04-19 RX ADMIN — LOSARTAN POTASSIUM SCH MG: 50 TABLET, FILM COATED ORAL at 07:38

## 2022-04-19 RX ADMIN — GABAPENTIN SCH MG: 300 CAPSULE ORAL at 13:34

## 2022-04-19 RX ADMIN — INSULIN GLARGINE SCH UNIT: 100 INJECTION, SOLUTION SUBCUTANEOUS at 12:06

## 2022-04-19 RX ADMIN — DULOXETINE SCH MG: 20 CAPSULE, DELAYED RELEASE ORAL at 07:38

## 2022-04-19 RX ADMIN — HUMAN INSULIN SCH: 100 INJECTION, SOLUTION SUBCUTANEOUS at 11:30

## 2022-04-19 RX ADMIN — LATANOPROST SCH DROP: 50 SOLUTION OPHTHALMIC at 19:53

## 2022-04-19 RX ADMIN — LOSARTAN POTASSIUM SCH MG: 50 TABLET, FILM COATED ORAL at 19:50

## 2022-04-19 RX ADMIN — APIXABAN SCH MG: 2.5 TABLET, FILM COATED ORAL at 07:37

## 2022-04-19 RX ADMIN — CLOPIDOGREL BISULFATE SCH MG: 75 TABLET, FILM COATED ORAL at 07:38

## 2022-04-19 RX ADMIN — DILTIAZEM HYDROCHLORIDE SCH MG: 120 CAPSULE, COATED, EXTENDED RELEASE ORAL at 07:37

## 2022-04-19 RX ADMIN — ATORVASTATIN CALCIUM SCH MG: 40 TABLET, FILM COATED ORAL at 19:51

## 2022-04-19 RX ADMIN — Medication SCH MG: at 07:38

## 2022-04-19 RX ADMIN — APIXABAN SCH MG: 2.5 TABLET, FILM COATED ORAL at 19:51

## 2022-04-19 RX ADMIN — LEVOTHYROXINE SODIUM SCH MG: 0.05 TABLET ORAL at 05:47

## 2022-04-19 RX ADMIN — GABAPENTIN SCH MG: 300 CAPSULE ORAL at 19:51

## 2022-04-19 RX ADMIN — HUMAN INSULIN SCH UNIT: 100 INJECTION, SOLUTION SUBCUTANEOUS at 16:58

## 2022-04-19 RX ADMIN — PIOGLITAZONE SCH MG: 15 TABLET ORAL at 07:38

## 2022-04-19 RX ADMIN — TRAMADOL HYDROCHLORIDE PRN MG: 50 TABLET, COATED ORAL at 19:59

## 2022-04-19 RX ADMIN — SENNOSIDES AND DOCUSATE SODIUM PRN TAB: 8.6; 5 TABLET ORAL at 19:53

## 2022-04-19 RX ADMIN — Medication SCH PATCH: at 07:01

## 2022-04-19 RX ADMIN — HUMAN INSULIN SCH: 100 INJECTION, SOLUTION SUBCUTANEOUS at 07:30

## 2022-04-19 RX ADMIN — INSULIN GLARGINE SCH: 100 INJECTION, SOLUTION SUBCUTANEOUS at 08:00

## 2022-04-20 RX ADMIN — ALOGLIPTIN SCH MG: 12.5 TABLET, FILM COATED ORAL at 10:25

## 2022-04-20 RX ADMIN — GABAPENTIN SCH MG: 300 CAPSULE ORAL at 15:13

## 2022-04-20 RX ADMIN — HUMAN INSULIN SCH: 100 INJECTION, SOLUTION SUBCUTANEOUS at 06:30

## 2022-04-20 RX ADMIN — DULOXETINE SCH MG: 20 CAPSULE, DELAYED RELEASE ORAL at 09:15

## 2022-04-20 RX ADMIN — Medication SCH: at 08:00

## 2022-04-20 RX ADMIN — INSULIN GLARGINE SCH: 100 INJECTION, SOLUTION SUBCUTANEOUS at 08:00

## 2022-04-20 RX ADMIN — TRAMADOL HYDROCHLORIDE PRN MG: 50 TABLET, COATED ORAL at 10:29

## 2022-04-20 RX ADMIN — GABAPENTIN SCH MG: 300 CAPSULE ORAL at 10:20

## 2022-04-20 RX ADMIN — DILTIAZEM HYDROCHLORIDE SCH MG: 120 CAPSULE, COATED, EXTENDED RELEASE ORAL at 10:24

## 2022-04-20 RX ADMIN — Medication SCH MG: at 09:16

## 2022-04-20 RX ADMIN — LOSARTAN POTASSIUM SCH MG: 50 TABLET, FILM COATED ORAL at 19:46

## 2022-04-20 RX ADMIN — GABAPENTIN SCH MG: 300 CAPSULE ORAL at 19:46

## 2022-04-20 RX ADMIN — LATANOPROST SCH DROP: 50 SOLUTION OPHTHALMIC at 19:45

## 2022-04-20 RX ADMIN — INSULIN GLARGINE SCH UNIT: 100 INJECTION, SOLUTION SUBCUTANEOUS at 12:20

## 2022-04-20 RX ADMIN — LOSARTAN POTASSIUM SCH MG: 50 TABLET, FILM COATED ORAL at 09:16

## 2022-04-20 RX ADMIN — Medication SCH PATCH: at 09:14

## 2022-04-20 RX ADMIN — ASPIRIN SCH MG: 81 TABLET, COATED ORAL at 09:16

## 2022-04-20 RX ADMIN — GLIMEPIRIDE SCH MG: 2 TABLET ORAL at 09:19

## 2022-04-20 RX ADMIN — APIXABAN SCH MG: 2.5 TABLET, FILM COATED ORAL at 09:15

## 2022-04-20 RX ADMIN — PIOGLITAZONE SCH MG: 15 TABLET ORAL at 09:19

## 2022-04-20 RX ADMIN — HUMAN INSULIN SCH: 100 INJECTION, SOLUTION SUBCUTANEOUS at 11:30

## 2022-04-20 RX ADMIN — HUMAN INSULIN SCH: 100 INJECTION, SOLUTION SUBCUTANEOUS at 19:46

## 2022-04-20 RX ADMIN — Medication SCH MG: at 19:46

## 2022-04-20 RX ADMIN — ATORVASTATIN CALCIUM SCH MG: 40 TABLET, FILM COATED ORAL at 19:45

## 2022-04-20 RX ADMIN — HUMAN INSULIN SCH: 100 INJECTION, SOLUTION SUBCUTANEOUS at 16:12

## 2022-04-20 RX ADMIN — CLOPIDOGREL BISULFATE SCH MG: 75 TABLET, FILM COATED ORAL at 09:17

## 2022-04-20 RX ADMIN — LEVOTHYROXINE SODIUM SCH MG: 0.05 TABLET ORAL at 06:26

## 2022-04-20 RX ADMIN — APIXABAN SCH MG: 2.5 TABLET, FILM COATED ORAL at 19:46

## 2022-04-20 NOTE — R.PN
PROGRESS NOTES



ENCOUNTER DATE AND TIME:



04/20/2022 14:23 (CDT)



NAME



JOSE ORO



YOB: 1950



DATE OF ADMISSION:



04/10/2022 15:34 (CDT)



STROKECHIEF COMPLAINT:



Stroke with left face, arm and leg weakness



SUBJECTIVE:



Pt denied any depression.

Pt denied any Shortness of Breath.

CBC with differential is normal. Glucose 95 to 198, prealbumin 17.6, UA is negative.

Ambulated 100' with standby assistance using a rolling walker. Self-propelled wheelchair 130' with st
andby assistance.

Did oral motor exercises with speech therapy.

After having increased urinary frequency, UA shows nitrite +ve, estrase +ve, >50 bacteria, > 50 WBC. 
Cultures showed 4+ gram negative rods and mixed abdiel.



VITAL SIGNS



Temperature: 98.9 F

SBP/DBP: 145/67

Pulse: 64

Resp: 15



MEDICATION ALLERGIES:



No Known Drug Allergies (NKDA)



ENVIRONMENTAL ALLERGIES:



- Substance Allergies

None Known

- Other Allergies

None Known



NURSING:



- Shower

allowing shower

- Bladder

care per protocol

- Skin

care per protocol



PRECAUTIONS:



- Weight Bearing Precaution

WBAT left LE

- Fall Precaution

SAFTY AND FALL



ACTIVITIES



OOB only with supervision



THERAPIES:



- Dietary and Nutrition

Adequate Nutrition. Nutritional Education. Nutritional Supplements. Evaluate and Treat. 



- Occupational Therapy

Cognitive Retraining. Patient needs Occupational Therapy for a daily minimum of 1.5 hours at least 5 
out of 7 days, to improve Activities of Daily Living, including: Eating, Grooming, Bathing, Dressing,
 Toileting, Toilet Transfers, Community Reintegration, Higher functional activities, Adaptive Equipme
nt, Splinting, Household Tasks, and Other activities as determined. Visual Perceptual Training. Evalu
ate and Treat. Transfer Training. Safety Awareness. Patient/Family Education. ADL Training. Household
 Tasks. Eating. 



- Speech Therapy

Cognitive Training. Expressive Language Skills. Memory Strategies. Patient needs Speech Therapy for a
 daily minimum of 1.5 hours at least 5 out of 7 days, to improve: Swallowing, Cognition, Language Ski
lls, and Compensatory Strategies. Receptive Language Skills. Speech Intelligibility Training. Evaluat
e and Treat. 



- Physical Therapy

Patient needs Physical Therapy for a daily minimum of 1.5 hours at least 5 out of 7 days, to improve:
 Mobility, Strengthening, Transfers, Stretching, ROM, Endurance, Ability to manage stairs, Gait, and 
Balance. Mobility Training. Safety Awareness. Gait Training. Balance Training. Transfer Training. Apoorva
luate and Treat. Patient/Family Education. LE Strengthening. 



PHYSICAL EXAM



- Gen

Alert and awake

Lying in bed

No apparent distress

Oriented to: person, time, and place

- Skin

No breakdown



Mild left facial numbness and weakness.

- Eyes

No abnormalities

- ENMT

No abnormalities

- Neck

No abnormalities

- CVS

RRR

- Chest

No abnormalities

- Abd

Soft

- GI

Soft



Deferred

- 

No abnormalities

- Ext

No significant edema

- MSK

3+ to 4+/5 weakness in left upper and lower extremity

- Neuro

3+ to 4+/5 weakness in left upper and lower extremity

- Psych

No abnormalities



ASSESSMENT:



Pt. is a 72 yo Right-handed female.On 04/03/2022 Pt. presented to [facility] with sudden
 onset of left-side weakness.On 04/03/2022 she was admitted to [facility] with diagnosis
 STROKE.Her impairment category is Stroke 01 -  Left Body (Right Brain) (01.1).Pre-morbidly, Pt. was 
independent/mod-I in Locomotion, Safety Awareness, Social Cognition, and Balance; and she had good Sp
hincter Control, Self-Care, Communication, and Endurance.Currently, she has deficits of Locomotion, S
afety Awareness, Balance, Social Cognition, Transfers Control, Self-Care, Sphincter Control, Communic
ation, and Endurance.Pt. is now referred to [facility] for acute in-patient re
habilitation in order to maximize patient's functional independence in activities of daily living, st
[facility], ROM, and mobility.- Rehab Goal

Patient has realistic goal of being discharged at assistance level 7-Ind to reside at Home with  Fami
ly/Relatives.

MDM/PLAN:



- Physical Therapy

 Gait dysfunction - to improve, our physical therapists will perform initial evaluation of pt's statu
s upon admission and devise an individualized program for Gait Training, and Wheel Chair mobility

 Inability to transfer - to improve, our physical therapists will perform initial evaluation of pt's 
status upon admission and devise an individualized program for Bed mobility

 Need for home safety evaluation - to improve, our physical therapists will perform initial evaluatio
n of pt's status upon admission and devise an individualized program for Home Evaluation

 Need in caregiver upon discharge - to improve, our physical therapists will perform initial evaluati
on of pt's status upon admission and devise an individualized program for Caregiver Training

 New precaution - to improve, our physical therapists will perform initial evaluation of pt's status 
upon admission and devise an individualized program for Patient precaution education

 Edema - to improve, our physical therapists will perform initial evaluation of pt's status upon admi
ssion and devise an individualized program for Elevation Training, and Lymphedema Therapy

 Poor balance - to improve, our physical therapists will perform initial evaluation of pt's status up
on admission and devise an individualized program for Balance Training

 Poor endurance - to improve, our physical therapists will perform initial evaluation of pt's status 
upon admission and devise an individualized program for Endurance Training

 Weakness - to improve, our physical therapists will perform initial evaluation of pt's status upon a
dmission and devise an individualized program for Aquatic Therapy, Neuromuscular Reeducation, and Str
engthening

 Achieving independence - to improve, our physical therapists will perform initial evaluation of pt's
 status upon admission and devise an individualized program for Community Reintegration Activities

- Occupational Therapy

 ADL deficits - to improve, our occupation therapists will perform initial evaluation of pt's status 
upon admission and devise an individualized program for Bathing, Bed mobility, Community Reintegratio
n, Cooking, Dressing, Eating, Fine Motor Skills, Grooming, Homemaking, Kitchen Mobility, Laundry, Pat
ient Education, Safety Awareness, Splinting - Positioning, Transfers(Toilet, Tub, Shower), and Wheel 
Chair Management

 Cognitive deficits - to improve, our occupation therapists will perform initial evaluation of pt's s
tatus upon admission and devise an individualized program for Cognition - orientation

 Need for care giver - to improve, our occupation therapists will perform initial evaluation of pt's 
status upon admission and devise an individualized program for Caregiver Training

 Weakness - to improve, our occupation therapists will perform initial evaluation of pt's status upon
 admission and devise an individualized program for Aquatic Therapy, Balance, Endurance, UE ROM, and 
UE strengthening

- Other

 See attached MAR (Medication Administration Record)

- Diet Type

Continue Regular

- Diet - Liquid Texture

Continue Regular

- Tube Feed

Continue N/A

- Bladder

 care per protocol

- Weight Bearing Precaution

 WBAT left LE

- Fall Precaution

 Bed alarm

 SAFTY AND FALL

 TABS alarm

 Wheel chair alarm

- Skin

 care per protocol

- Diet - Solid Texture

Continue Regular

- Shower

 allowing shower



 for Dementia, TBI, Stroke, or others



FUNCTIONAL STATUS: UPDATED AT WEEKLY TEAM CONFERENCE



- Bladder

Same accident frequency: 7-Ind - No accidents in the past 7 days

- Bowel

Same accident frequency: 7-Ind - No accidents in the past 7 days

- Walking

Same score based on distance walked: 0(N/A)

Same score based on distance walked: 3(>=150ft)

- Wheelchair

Same score based on distance traveled: 0(N/A)



FUNCTIONAL STATUS:



- Self-Care

A. Eating    sup   

B. Grooming    sup   

C. Bathing    Kaylee   

D. Dressing - Upper     Kaylee   

E. Dressing - Lower     modA   

F. Toileting    Kaylee   

- Sphincter Control

G. Bladder control     Freddy   

H. Bowel control     Freddy   

- Transfers Control

I. Bed/Chair/Wheelchair     Kaylee   

J. Toilet    Kaylee   

K. Tub/Shower    modA   

- Locomotion

L. Walk/Wheelchair (B)     Kaylee   

M. Stairs    Kaylee   

- Communication

N. Comprehension (B)     Kaylee   

O. Expression (B)     Kaylee   

- Social Cognition

P. Social Interaction    Freddy   

Q. Problem Solving    sup   

R. Memory    sup   

- Endurance

Fair

- Balance

Fair

- Safety Awareness

Fair



QI SCORES:



- Self-Care

A. Eating  03-Partial/moderate assistance  

B. Oral hygiene  03-Partial/moderate assistance  

C. Toileting hygiene  03-Partial/moderate assistance  

E. Shower/bathe self  02-Substantial/maximal assistance  

F. Upper body dressing  02-Substantial/maximal assistance  

G. Lower body dressing  02-Substantial/maximal assistance  

H. Putting on/taking off footwear  88-Not attempted due to medical condition or safety concerns  

- Mobility

A. Roll left and right  03-Partial/moderate assistance  

B. Sit to lying  03-Partial/moderate assistance  

C. Lying to sitting on side of bed  03-Partial/moderate assistance  

D. Sit to stand  03-Partial/moderate assistance  

E. Chair/bed-to-chair transfer  03-Partial/moderate assistance  

F. Toilet transfer  03-Partial/moderate assistance  

G. Car transfer  88-Not attempted due to medical condition or safety concerns  

I. Walk 10 feet  03-Partial/moderate assistance  

J. Walk 50 feet with two turns  03-Partial/moderate assistance  

K. Walk 150 feet  03-Partial/moderate assistance  

L. Walking 10 feet on uneven surfaces  88-Not attempted due to medical condition or safety concerns  


M. 1 step (curb)  88-Not attempted due to medical condition or safety concerns  

N. 4 steps  88-Not attempted due to medical condition or safety concerns  

O. 12 steps  88-Not attempted due to medical condition or safety concerns  

P. Picking up object  88-Not attempted due to medical condition or safety concerns  

R. Wheel 50 feet with two turns  88-Not attempted due to medical condition or safety concerns  

S. Wheel 150 feet  88-Not attempted due to medical condition or safety concerns  

- Bladder and Bowel

Bladder continence      

Bowel continence      

- Endurance

Fair

- Balance

Fair

- Safety Awareness

Fair



CURRENT [facility]. DEFICITS:



Self-Care, Mobility, Endurance, Balance, and Safety Awareness



SIGNATURE PANEL:



Electronically signed by Dr. Fabio Shaffer M.D. on 04/20/2022 at 17:10 (CDT)

## 2022-04-21 LAB
ALBUMIN SERPL BCP-MCNC: 2 G/DL (ref 3.4–5)
BUN BLD-MCNC: 23 MG/DL (ref 7–18)
GLUCOSE SERPLBLD-MCNC: 201 MG/DL (ref 74–106)
HCT VFR BLD CALC: 34.5 % (ref 36–45)
LYMPHOCYTES # SPEC AUTO: 1.7 K/UL (ref 0.7–4.9)
MAGNESIUM SERPL-MCNC: 2.2 MG/DL (ref 1.8–2.4)
PMV BLD: 11.9 FL (ref 7.6–11.3)
POTASSIUM SERPL-SCNC: 4.2 MMOL/L (ref 3.5–5.1)
PREALB SERPL-MCNC: 14.8 MG/DL (ref 20–40)
RBC # BLD: 3.79 M/UL (ref 3.86–4.86)

## 2022-04-21 RX ADMIN — LATANOPROST SCH DROP: 50 SOLUTION OPHTHALMIC at 19:49

## 2022-04-21 RX ADMIN — Medication SCH: at 08:00

## 2022-04-21 RX ADMIN — TRAMADOL HYDROCHLORIDE PRN MG: 50 TABLET, COATED ORAL at 08:17

## 2022-04-21 RX ADMIN — HUMAN INSULIN SCH: 100 INJECTION, SOLUTION SUBCUTANEOUS at 11:30

## 2022-04-21 RX ADMIN — GLIMEPIRIDE SCH MG: 2 TABLET ORAL at 08:18

## 2022-04-21 RX ADMIN — TRAMADOL HYDROCHLORIDE PRN MG: 50 TABLET, COATED ORAL at 13:41

## 2022-04-21 RX ADMIN — INSULIN GLARGINE SCH UNIT: 100 INJECTION, SOLUTION SUBCUTANEOUS at 08:58

## 2022-04-21 RX ADMIN — PIOGLITAZONE SCH MG: 15 TABLET ORAL at 08:16

## 2022-04-21 RX ADMIN — LOSARTAN POTASSIUM SCH MG: 50 TABLET, FILM COATED ORAL at 19:44

## 2022-04-21 RX ADMIN — CLOPIDOGREL BISULFATE SCH MG: 75 TABLET, FILM COATED ORAL at 08:17

## 2022-04-21 RX ADMIN — LOSARTAN POTASSIUM SCH MG: 50 TABLET, FILM COATED ORAL at 08:18

## 2022-04-21 RX ADMIN — LEVOTHYROXINE SODIUM SCH MG: 0.05 TABLET ORAL at 05:23

## 2022-04-21 RX ADMIN — HUMAN INSULIN SCH: 100 INJECTION, SOLUTION SUBCUTANEOUS at 16:15

## 2022-04-21 RX ADMIN — ALOGLIPTIN SCH MG: 12.5 TABLET, FILM COATED ORAL at 09:52

## 2022-04-21 RX ADMIN — GABAPENTIN SCH MG: 300 CAPSULE ORAL at 13:41

## 2022-04-21 RX ADMIN — HUMAN INSULIN SCH: 100 INJECTION, SOLUTION SUBCUTANEOUS at 07:30

## 2022-04-21 RX ADMIN — APIXABAN SCH MG: 2.5 TABLET, FILM COATED ORAL at 19:43

## 2022-04-21 RX ADMIN — HUMAN INSULIN SCH: 100 INJECTION, SOLUTION SUBCUTANEOUS at 19:44

## 2022-04-21 RX ADMIN — Medication SCH MG: at 08:17

## 2022-04-21 RX ADMIN — CIPROFLOXACIN SCH MG: 500 TABLET, FILM COATED ORAL at 19:44

## 2022-04-21 RX ADMIN — DULOXETINE SCH MG: 20 CAPSULE, DELAYED RELEASE ORAL at 08:19

## 2022-04-21 RX ADMIN — APIXABAN SCH MG: 2.5 TABLET, FILM COATED ORAL at 08:19

## 2022-04-21 RX ADMIN — Medication SCH PATCH: at 11:37

## 2022-04-21 RX ADMIN — ATORVASTATIN CALCIUM SCH MG: 40 TABLET, FILM COATED ORAL at 19:44

## 2022-04-21 RX ADMIN — GABAPENTIN SCH MG: 300 CAPSULE ORAL at 19:44

## 2022-04-21 RX ADMIN — ASPIRIN SCH MG: 81 TABLET, COATED ORAL at 08:17

## 2022-04-21 RX ADMIN — DILTIAZEM HYDROCHLORIDE SCH MG: 120 CAPSULE, COATED, EXTENDED RELEASE ORAL at 09:52

## 2022-04-21 RX ADMIN — Medication SCH MG: at 19:43

## 2022-04-21 RX ADMIN — GABAPENTIN SCH MG: 300 CAPSULE ORAL at 08:18

## 2022-04-21 NOTE — R.PN
PROGRESS NOTES



ENCOUNTER DATE AND TIME:



04/21/2022 18:56 (CDT)



NAME



JOSE ORO



YOB: 1950



DATE OF ADMISSION:



04/10/2022 15:34 (CDT)



STROKECHIEF COMPLAINT:



Stroke with left face, arm and leg weakness



SUBJECTIVE:



Pt denied any depression.

Pt denied any Shortness of Breath.

CBC with differential is normal except Hgb is mildly low at 11.8. Glucose 114 to 201, prealbumin 14.8
, UA is negative.

Ambulated 250' with supervision using a rolling walker. Self-propelled wheelchair 30' with standby as
sistance.

Did oral motor exercises with speech therapy.

After having increased urinary frequency, UA shows nitrite +ve, estrase +ve, >50 bacteria, > 50 WBC. 
Cultures showed 4+ gram negative rods and mixed abdiel.



VITAL SIGNS



Temperature: 98.2 F

SBP/DBP: 157/65

Pulse: 70

Resp: 16



MEDICATION ALLERGIES:



No Known Drug Allergies (NKDA)



ENVIRONMENTAL ALLERGIES:



- Substance Allergies

None Known

- Other Allergies

None Known



NURSING:



- Shower

allowing shower

- Bladder

care per protocol

- Skin

care per protocol



PRECAUTIONS:



- Weight Bearing Precaution

WBAT left LE

- Fall Precaution

SAFTY AND FALL



ACTIVITIES



OOB only with supervision



THERAPIES:



- Dietary and Nutrition

Adequate Nutrition. Nutritional Education. Nutritional Supplements. Evaluate and Treat. 



- Occupational Therapy

Cognitive Retraining. Patient needs Occupational Therapy for a daily minimum of 1.5 hours at least 5 
out of 7 days, to improve Activities of Daily Living, including: Eating, Grooming, Bathing, Dressing,
 Toileting, Toilet Transfers, Community Reintegration, Higher functional activities, Adaptive Equipme
nt, Splinting, Household Tasks, and Other activities as determined. Visual Perceptual Training. Evalu
ate and Treat. Transfer Training. Safety Awareness. Patient/Family Education. ADL Training. Household
 Tasks. Eating. 



- Speech Therapy

Cognitive Training. Expressive Language Skills. Memory Strategies. Patient needs Speech Therapy for a
 daily minimum of 1.5 hours at least 5 out of 7 days, to improve: Swallowing, Cognition, Language Ski
lls, and Compensatory Strategies. Receptive Language Skills. Speech Intelligibility Training. Evaluat
e and Treat. 



- Physical Therapy

Patient needs Physical Therapy for a daily minimum of 1.5 hours at least 5 out of 7 days, to improve:
 Mobility, Strengthening, Transfers, Stretching, ROM, Endurance, Ability to manage stairs, Gait, and 
Balance. Mobility Training. Safety Awareness. Gait Training. Balance Training. Transfer Training. Apoorva
luate and Treat. Patient/Family Education. LE Strengthening. 



PHYSICAL EXAM



- Gen

Alert and awake

Lying in bed

No apparent distress

Oriented to: person, time, and place

- Skin

No breakdown



Mild left facial numbness and weakness.

- Eyes

No abnormalities

- ENMT

No abnormalities

- Neck

No abnormalities

- CVS

RRR

- Chest

No abnormalities

- Abd

Soft

- GI

Soft



Deferred

- 

No abnormalities

- Ext

No significant edema

- MSK

3+ to 4+/5 weakness in left upper and lower extremity

- Neuro

3+ to 4+/5 weakness in left upper and lower extremity

- Psych

No abnormalities



ASSESSMENT:



Pt. is a 70 yo Right-handed female.On 04/03/2022 Pt. presented to [facility] with sudden
 onset of left-side weakness.On 04/03/2022 she was admitted to [facility] with diagnosis
 STROKE.Her impairment category is Stroke 01 -  Left Body (Right Brain) (01.1).Pre-morbidly, Pt. was 
independent/mod-I in Locomotion, Safety Awareness, Social Cognition, and Balance; and she had good Sp
hincter Control, Self-Care, Communication, and Endurance.Currently, she has deficits of Locomotion, S
afety Awareness, Balance, Social Cognition, Transfers Control, Self-Care, Sphincter Control, Communic
ation, and Endurance.Pt. is now referred to [facility] for acute in-patient re
habilitation in order to maximize patient's functional independence in activities of daily living, st
rength, ROM, and mobility.- Rehab Goal

Patient has realistic goal of being discharged at assistance level 7-Ind to reside at Home with  Fami
ly/Relatives.

MDM/PLAN:



- Physical Therapy

 Gait dysfunction - to improve, our physical therapists will perform initial evaluation of pt's statu
s upon admission and devise an individualized program for Gait Training, and Wheel Chair mobility

 Inability to transfer - to improve, our physical therapists will perform initial evaluation of pt's 
status upon admission and devise an individualized program for Bed mobility

 Need for home safety evaluation - to improve, our physical therapists will perform initial evaluatio
n of pt's status upon admission and devise an individualized program for Home Evaluation

 Need in caregiver upon discharge - to improve, our physical therapists will perform initial evaluati
on of pt's status upon admission and devise an individualized program for Caregiver Training

 New precaution - to improve, our physical therapists will perform initial evaluation of pt's status 
upon admission and devise an individualized program for Patient precaution education

 Edema - to improve, our physical therapists will perform initial evaluation of pt's status upon admi
ssion and devise an individualized program for Elevation Training, and Lymphedema Therapy

 Poor balance - to improve, our physical therapists will perform initial evaluation of pt's status up
on admission and devise an individualized program for Balance Training

 Poor endurance - to improve, our physical therapists will perform initial evaluation of pt's status 
upon admission and devise an individualized program for Endurance Training

 Weakness - to improve, our physical therapists will perform initial evaluation of pt's status upon a
dmission and devise an individualized program for Aquatic Therapy, Neuromuscular Reeducation, and Str
engthening

 Achieving independence - to improve, our physical therapists will perform initial evaluation of pt's
 status upon admission and devise an individualized program for Community Reintegration Activities

- Occupational Therapy

 ADL deficits - to improve, our occupation therapists will perform initial evaluation of pt's status 
upon admission and devise an individualized program for Bathing, Bed mobility, Community Reintegratio
n, Cooking, Dressing, Eating, Fine Motor Skills, Grooming, Homemaking, Kitchen Mobility, Laundry, Pat
ient Education, Safety Awareness, Splinting - Positioning, Transfers(Toilet, Tub, Shower), and Wheel 
Chair Management

 Cognitive deficits - to improve, our occupation therapists will perform initial evaluation of pt's s
tatus upon admission and devise an individualized program for Cognition - orientation

 Need for care giver - to improve, our occupation therapists will perform initial evaluation of pt's 
status upon admission and devise an individualized program for Caregiver Training

 Weakness - to improve, our occupation therapists will perform initial evaluation of pt's status upon
 admission and devise an individualized program for Aquatic Therapy, Balance, Endurance, UE ROM, and 
UE strengthening

- Other

 See attached MAR (Medication Administration Record)

- Diet Type

Continue Regular

- Diet - Liquid Texture

Continue Regular

- Tube Feed

Continue N/A

- Bladder

 care per protocol

- Weight Bearing Precaution

 WBAT left LE

- Fall Precaution

 Bed alarm

 SAFTY AND FALL

 TABS alarm

 Wheel chair alarm

- Skin

 care per protocol

- Diet - Solid Texture

Continue Regular

- Shower

 allowing shower



 for Dementia, TBI, Stroke, or others



FUNCTIONAL STATUS: UPDATED AT WEEKLY TEAM CONFERENCE



- Bladder

Same accident frequency: 7-Ind - No accidents in the past 7 days

- Bowel

Same accident frequency: 7-Ind - No accidents in the past 7 days

- Walking

Same score based on distance walked: 0(N/A)

Same score based on distance walked: 3(>=150ft)

- Wheelchair

Same score based on distance traveled: 0(N/A)



FUNCTIONAL STATUS:



- Self-Care

A. Eating    sup   

B. Grooming    sup   

C. Bathing    Kaylee   

D. Dressing - Upper     Kaylee   

E. Dressing - Lower     modA   

F. Toileting    Kaylee   

- Sphincter Control

G. Bladder control     Freddy   

H. Bowel control     Freddy   

- Transfers Control

I. Bed/Chair/Wheelchair     Kaylee   

J. Toilet    Kaylee   

K. Tub/Shower    modA   

- Locomotion

L. Walk/Wheelchair (B)     Kayele   

M. Stairs    Kaylee   

- Communication

N. Comprehension (B)     Kaylee   

O. Expression (B)     Kaylee   

- Social Cognition

P. Social Interaction    Freddy   

Q. Problem Solving    sup   

R. Memory    sup   

- Endurance

Fair

- Balance

Fair

- Safety Awareness

Fair



QI SCORES:



- Self-Care

A. Eating  03-Partial/moderate assistance  

B. Oral hygiene  03-Partial/moderate assistance  

C. Toileting hygiene  03-Partial/moderate assistance  

E. Shower/bathe self  02-Substantial/maximal assistance  

F. Upper body dressing  02-Substantial/maximal assistance  

G. Lower body dressing  02-Substantial/maximal assistance  

H. Putting on/taking off footwear  88-Not attempted due to medical condition or safety concerns  

- Mobility

A. Roll left and right  03-Partial/moderate assistance  

B. Sit to lying  03-Partial/moderate assistance  

C. Lying to sitting on side of bed  03-Partial/moderate assistance  

D. Sit to stand  03-Partial/moderate assistance  

E. Chair/bed-to-chair transfer  03-Partial/moderate assistance  

F. Toilet transfer  03-Partial/moderate assistance  

G. Car transfer  88-Not attempted due to medical condition or safety concerns  

I. Walk 10 feet  03-Partial/moderate assistance  

J. Walk 50 feet with two turns  03-Partial/moderate assistance  

K. Walk 150 feet  03-Partial/moderate assistance  

L. Walking 10 feet on uneven surfaces  88-Not attempted due to medical condition or safety concerns  


M. 1 step (curb)  88-Not attempted due to medical condition or safety concerns  

N. 4 steps  88-Not attempted due to medical condition or safety concerns  

O. 12 steps  88-Not attempted due to medical condition or safety concerns  

P. Picking up object  88-Not attempted due to medical condition or safety concerns  

R. Wheel 50 feet with two turns  88-Not attempted due to medical condition or safety concerns  

S. Wheel 150 feet  88-Not attempted due to medical condition or safety concerns  

- Bladder and Bowel

Bladder continence      

Bowel continence      

- Endurance

Fair

- Balance

Fair

- Safety Awareness

Fair



[facility] DEFICITS:



Self-Care, Mobility, Endurance, Balance, and Safety Awareness



SIGNATURE PANEL:



Electronically signed by Dr. Fabio Shaffer M.D. on 04/21/2022 at 19:02 (CDT)

## 2022-04-22 RX ADMIN — Medication SCH PATCH: at 07:55

## 2022-04-22 RX ADMIN — TRAMADOL HYDROCHLORIDE PRN MG: 50 TABLET, COATED ORAL at 08:01

## 2022-04-22 RX ADMIN — HUMAN INSULIN SCH: 100 INJECTION, SOLUTION SUBCUTANEOUS at 19:42

## 2022-04-22 RX ADMIN — GABAPENTIN SCH MG: 300 CAPSULE ORAL at 07:57

## 2022-04-22 RX ADMIN — APIXABAN SCH MG: 2.5 TABLET, FILM COATED ORAL at 19:42

## 2022-04-22 RX ADMIN — HUMAN INSULIN SCH: 100 INJECTION, SOLUTION SUBCUTANEOUS at 07:30

## 2022-04-22 RX ADMIN — CIPROFLOXACIN SCH MG: 500 TABLET, FILM COATED ORAL at 07:58

## 2022-04-22 RX ADMIN — HUMAN INSULIN SCH: 100 INJECTION, SOLUTION SUBCUTANEOUS at 16:30

## 2022-04-22 RX ADMIN — Medication SCH MG: at 19:41

## 2022-04-22 RX ADMIN — HUMAN INSULIN SCH: 100 INJECTION, SOLUTION SUBCUTANEOUS at 11:30

## 2022-04-22 RX ADMIN — GABAPENTIN SCH MG: 300 CAPSULE ORAL at 19:42

## 2022-04-22 RX ADMIN — INSULIN GLARGINE SCH UNIT: 100 INJECTION, SOLUTION SUBCUTANEOUS at 08:00

## 2022-04-22 RX ADMIN — LEVOTHYROXINE SODIUM SCH MG: 0.05 TABLET ORAL at 05:13

## 2022-04-22 RX ADMIN — DILTIAZEM HYDROCHLORIDE SCH MG: 120 CAPSULE, COATED, EXTENDED RELEASE ORAL at 07:56

## 2022-04-22 RX ADMIN — ALOGLIPTIN SCH MG: 12.5 TABLET, FILM COATED ORAL at 07:58

## 2022-04-22 RX ADMIN — ATORVASTATIN CALCIUM SCH MG: 40 TABLET, FILM COATED ORAL at 19:42

## 2022-04-22 RX ADMIN — ASPIRIN SCH MG: 81 TABLET, COATED ORAL at 07:58

## 2022-04-22 RX ADMIN — DULOXETINE SCH MG: 20 CAPSULE, DELAYED RELEASE ORAL at 07:56

## 2022-04-22 RX ADMIN — LATANOPROST SCH DROP: 50 SOLUTION OPHTHALMIC at 19:43

## 2022-04-22 RX ADMIN — TRAMADOL HYDROCHLORIDE PRN MG: 50 TABLET, COATED ORAL at 15:20

## 2022-04-22 RX ADMIN — Medication SCH MG: at 07:58

## 2022-04-22 RX ADMIN — GLIMEPIRIDE SCH MG: 2 TABLET ORAL at 07:58

## 2022-04-22 RX ADMIN — CIPROFLOXACIN SCH MG: 500 TABLET, FILM COATED ORAL at 19:41

## 2022-04-22 RX ADMIN — GABAPENTIN SCH MG: 300 CAPSULE ORAL at 15:20

## 2022-04-22 RX ADMIN — TRAMADOL HYDROCHLORIDE PRN MG: 50 TABLET, COATED ORAL at 20:23

## 2022-04-22 RX ADMIN — LOSARTAN POTASSIUM SCH MG: 50 TABLET, FILM COATED ORAL at 07:57

## 2022-04-22 RX ADMIN — APIXABAN SCH MG: 2.5 TABLET, FILM COATED ORAL at 07:57

## 2022-04-22 RX ADMIN — PIOGLITAZONE SCH MG: 15 TABLET ORAL at 07:56

## 2022-04-22 RX ADMIN — LOSARTAN POTASSIUM SCH MG: 50 TABLET, FILM COATED ORAL at 19:41

## 2022-04-23 RX ADMIN — APIXABAN SCH MG: 2.5 TABLET, FILM COATED ORAL at 19:46

## 2022-04-23 RX ADMIN — GABAPENTIN SCH MG: 300 CAPSULE ORAL at 13:30

## 2022-04-23 RX ADMIN — LEVOTHYROXINE SODIUM SCH MG: 0.05 TABLET ORAL at 06:16

## 2022-04-23 RX ADMIN — GABAPENTIN SCH MG: 300 CAPSULE ORAL at 07:28

## 2022-04-23 RX ADMIN — HUMAN INSULIN SCH: 100 INJECTION, SOLUTION SUBCUTANEOUS at 16:30

## 2022-04-23 RX ADMIN — ALOGLIPTIN SCH MG: 12.5 TABLET, FILM COATED ORAL at 07:54

## 2022-04-23 RX ADMIN — LOSARTAN POTASSIUM SCH MG: 50 TABLET, FILM COATED ORAL at 07:55

## 2022-04-23 RX ADMIN — GABAPENTIN SCH MG: 300 CAPSULE ORAL at 19:46

## 2022-04-23 RX ADMIN — ATORVASTATIN CALCIUM SCH MG: 40 TABLET, FILM COATED ORAL at 19:46

## 2022-04-23 RX ADMIN — Medication SCH MG: at 07:55

## 2022-04-23 RX ADMIN — HUMAN INSULIN SCH: 100 INJECTION, SOLUTION SUBCUTANEOUS at 07:30

## 2022-04-23 RX ADMIN — ACETAMINOPHEN PRN MG: 500 TABLET, FILM COATED ORAL at 00:28

## 2022-04-23 RX ADMIN — HUMAN INSULIN SCH: 100 INJECTION, SOLUTION SUBCUTANEOUS at 19:47

## 2022-04-23 RX ADMIN — ASPIRIN SCH MG: 81 TABLET, COATED ORAL at 07:55

## 2022-04-23 RX ADMIN — TRAMADOL HYDROCHLORIDE PRN MG: 50 TABLET, COATED ORAL at 13:30

## 2022-04-23 RX ADMIN — INSULIN GLARGINE SCH: 100 INJECTION, SOLUTION SUBCUTANEOUS at 07:36

## 2022-04-23 RX ADMIN — TRAMADOL HYDROCHLORIDE PRN MG: 50 TABLET, COATED ORAL at 07:27

## 2022-04-23 RX ADMIN — DULOXETINE SCH MG: 20 CAPSULE, DELAYED RELEASE ORAL at 07:56

## 2022-04-23 RX ADMIN — LATANOPROST SCH DROP: 50 SOLUTION OPHTHALMIC at 19:45

## 2022-04-23 RX ADMIN — GLIMEPIRIDE SCH MG: 2 TABLET ORAL at 07:55

## 2022-04-23 RX ADMIN — PIOGLITAZONE SCH MG: 15 TABLET ORAL at 07:55

## 2022-04-23 RX ADMIN — CIPROFLOXACIN SCH MG: 500 TABLET, FILM COATED ORAL at 07:55

## 2022-04-23 RX ADMIN — DILTIAZEM HYDROCHLORIDE SCH MG: 120 CAPSULE, COATED, EXTENDED RELEASE ORAL at 07:56

## 2022-04-23 RX ADMIN — Medication SCH MG: at 19:46

## 2022-04-23 RX ADMIN — INSULIN GLARGINE SCH UNIT: 100 INJECTION, SOLUTION SUBCUTANEOUS at 11:48

## 2022-04-23 RX ADMIN — HUMAN INSULIN SCH: 100 INJECTION, SOLUTION SUBCUTANEOUS at 11:30

## 2022-04-23 RX ADMIN — APIXABAN SCH MG: 2.5 TABLET, FILM COATED ORAL at 07:56

## 2022-04-23 RX ADMIN — Medication SCH PATCH: at 06:18

## 2022-04-23 RX ADMIN — LOSARTAN POTASSIUM SCH MG: 50 TABLET, FILM COATED ORAL at 19:46

## 2022-04-23 RX ADMIN — CIPROFLOXACIN SCH MG: 500 TABLET, FILM COATED ORAL at 19:46

## 2022-04-23 RX ADMIN — TRAMADOL HYDROCHLORIDE PRN MG: 50 TABLET, COATED ORAL at 19:45

## 2022-04-24 RX ADMIN — APIXABAN SCH MG: 2.5 TABLET, FILM COATED ORAL at 08:10

## 2022-04-24 RX ADMIN — ASPIRIN SCH MG: 81 TABLET, COATED ORAL at 08:08

## 2022-04-24 RX ADMIN — Medication SCH MG: at 08:09

## 2022-04-24 RX ADMIN — TRAMADOL HYDROCHLORIDE PRN MG: 50 TABLET, COATED ORAL at 19:44

## 2022-04-24 RX ADMIN — Medication SCH PATCH: at 06:36

## 2022-04-24 RX ADMIN — INSULIN GLARGINE SCH UNIT: 100 INJECTION, SOLUTION SUBCUTANEOUS at 12:00

## 2022-04-24 RX ADMIN — LOSARTAN POTASSIUM SCH MG: 50 TABLET, FILM COATED ORAL at 08:09

## 2022-04-24 RX ADMIN — APIXABAN SCH MG: 2.5 TABLET, FILM COATED ORAL at 19:45

## 2022-04-24 RX ADMIN — HUMAN INSULIN SCH: 100 INJECTION, SOLUTION SUBCUTANEOUS at 19:45

## 2022-04-24 RX ADMIN — GABAPENTIN SCH MG: 300 CAPSULE ORAL at 13:20

## 2022-04-24 RX ADMIN — Medication SCH MG: at 19:44

## 2022-04-24 RX ADMIN — GABAPENTIN SCH MG: 300 CAPSULE ORAL at 19:44

## 2022-04-24 RX ADMIN — CIPROFLOXACIN SCH MG: 500 TABLET, FILM COATED ORAL at 08:09

## 2022-04-24 RX ADMIN — DILTIAZEM HYDROCHLORIDE SCH MG: 120 CAPSULE, COATED, EXTENDED RELEASE ORAL at 08:09

## 2022-04-24 RX ADMIN — TRAMADOL HYDROCHLORIDE PRN MG: 50 TABLET, COATED ORAL at 13:20

## 2022-04-24 RX ADMIN — LOSARTAN POTASSIUM SCH MG: 50 TABLET, FILM COATED ORAL at 19:44

## 2022-04-24 RX ADMIN — GABAPENTIN SCH MG: 300 CAPSULE ORAL at 07:40

## 2022-04-24 RX ADMIN — TRAMADOL HYDROCHLORIDE PRN MG: 50 TABLET, COATED ORAL at 07:40

## 2022-04-24 RX ADMIN — LATANOPROST SCH DROP: 50 SOLUTION OPHTHALMIC at 19:45

## 2022-04-24 RX ADMIN — HUMAN INSULIN SCH: 100 INJECTION, SOLUTION SUBCUTANEOUS at 11:30

## 2022-04-24 RX ADMIN — Medication SCH: at 07:50

## 2022-04-24 RX ADMIN — HUMAN INSULIN SCH: 100 INJECTION, SOLUTION SUBCUTANEOUS at 16:24

## 2022-04-24 RX ADMIN — LEVOTHYROXINE SODIUM SCH MG: 0.05 TABLET ORAL at 06:36

## 2022-04-24 RX ADMIN — PIOGLITAZONE SCH MG: 15 TABLET ORAL at 08:08

## 2022-04-24 RX ADMIN — DULOXETINE SCH MG: 20 CAPSULE, DELAYED RELEASE ORAL at 08:09

## 2022-04-24 RX ADMIN — CIPROFLOXACIN SCH MG: 500 TABLET, FILM COATED ORAL at 19:45

## 2022-04-24 RX ADMIN — FEXOFENADINE HYDROCHLORIDE PRN MG: 180 TABLET, FILM COATED ORAL at 10:28

## 2022-04-24 RX ADMIN — ATORVASTATIN CALCIUM SCH MG: 40 TABLET, FILM COATED ORAL at 19:44

## 2022-04-24 RX ADMIN — HUMAN INSULIN SCH: 100 INJECTION, SOLUTION SUBCUTANEOUS at 07:30

## 2022-04-24 RX ADMIN — ALOGLIPTIN SCH MG: 12.5 TABLET, FILM COATED ORAL at 08:09

## 2022-04-24 RX ADMIN — GLIMEPIRIDE SCH MG: 2 TABLET ORAL at 08:09

## 2022-04-24 RX ADMIN — INSULIN GLARGINE SCH: 100 INJECTION, SOLUTION SUBCUTANEOUS at 08:00

## 2022-04-25 RX ADMIN — DULOXETINE SCH MG: 20 CAPSULE, DELAYED RELEASE ORAL at 07:21

## 2022-04-25 RX ADMIN — LATANOPROST SCH DROP: 50 SOLUTION OPHTHALMIC at 20:10

## 2022-04-25 RX ADMIN — HUMAN INSULIN SCH: 100 INJECTION, SOLUTION SUBCUTANEOUS at 16:30

## 2022-04-25 RX ADMIN — Medication SCH MG: at 07:21

## 2022-04-25 RX ADMIN — ASPIRIN SCH MG: 81 TABLET, COATED ORAL at 07:21

## 2022-04-25 RX ADMIN — INSULIN GLARGINE SCH UNIT: 100 INJECTION, SOLUTION SUBCUTANEOUS at 08:21

## 2022-04-25 RX ADMIN — ALOGLIPTIN SCH MG: 12.5 TABLET, FILM COATED ORAL at 07:20

## 2022-04-25 RX ADMIN — TRAMADOL HYDROCHLORIDE PRN MG: 50 TABLET, COATED ORAL at 06:50

## 2022-04-25 RX ADMIN — GABAPENTIN SCH MG: 300 CAPSULE ORAL at 20:12

## 2022-04-25 RX ADMIN — DILTIAZEM HYDROCHLORIDE SCH MG: 120 CAPSULE, COATED, EXTENDED RELEASE ORAL at 07:22

## 2022-04-25 RX ADMIN — ACETAMINOPHEN PRN MG: 500 TABLET, FILM COATED ORAL at 03:56

## 2022-04-25 RX ADMIN — GABAPENTIN SCH MG: 300 CAPSULE ORAL at 06:50

## 2022-04-25 RX ADMIN — LOSARTAN POTASSIUM SCH MG: 50 TABLET, FILM COATED ORAL at 20:09

## 2022-04-25 RX ADMIN — Medication SCH: at 07:52

## 2022-04-25 RX ADMIN — GLIMEPIRIDE SCH MG: 2 TABLET ORAL at 07:20

## 2022-04-25 RX ADMIN — TRAMADOL HYDROCHLORIDE PRN MG: 50 TABLET, COATED ORAL at 13:08

## 2022-04-25 RX ADMIN — HUMAN INSULIN SCH: 100 INJECTION, SOLUTION SUBCUTANEOUS at 20:11

## 2022-04-25 RX ADMIN — CIPROFLOXACIN SCH MG: 500 TABLET, FILM COATED ORAL at 07:21

## 2022-04-25 RX ADMIN — CIPROFLOXACIN SCH MG: 500 TABLET, FILM COATED ORAL at 20:09

## 2022-04-25 RX ADMIN — LEVOTHYROXINE SODIUM SCH MG: 0.05 TABLET ORAL at 06:49

## 2022-04-25 RX ADMIN — ATORVASTATIN CALCIUM SCH MG: 40 TABLET, FILM COATED ORAL at 20:12

## 2022-04-25 RX ADMIN — APIXABAN SCH MG: 2.5 TABLET, FILM COATED ORAL at 07:21

## 2022-04-25 RX ADMIN — Medication SCH MG: at 20:09

## 2022-04-25 RX ADMIN — PIOGLITAZONE SCH MG: 15 TABLET ORAL at 07:20

## 2022-04-25 RX ADMIN — GABAPENTIN SCH MG: 300 CAPSULE ORAL at 13:08

## 2022-04-25 RX ADMIN — LOSARTAN POTASSIUM SCH MG: 50 TABLET, FILM COATED ORAL at 07:20

## 2022-04-25 RX ADMIN — HUMAN INSULIN SCH: 100 INJECTION, SOLUTION SUBCUTANEOUS at 11:30

## 2022-04-25 RX ADMIN — APIXABAN SCH MG: 2.5 TABLET, FILM COATED ORAL at 20:09

## 2022-04-25 RX ADMIN — HUMAN INSULIN SCH: 100 INJECTION, SOLUTION SUBCUTANEOUS at 07:30

## 2022-04-25 NOTE — R.PN
PROGRESS NOTES



ENCOUNTER DATE AND TIME:



04/25/2022 22:08 (CDT)



NAME



JOSE ORO



YOB: 1950



DATE OF ADMISSION:



04/10/2022 15:34 (CDT)



STROKECHIEF COMPLAINT:



Stroke with left face, arm and leg weakness



SUBJECTIVE:



Pt denied any depression.

Pt denied any Shortness of Breath.

CBC with differential is normal except Hgb is mildly low at 11.8. Glucose 64 to 146, prealbumin 14.8,
 UA is negative.

Ambulated 250' with standby assistance using a rolling walker. Self-propelled wheelchair 100' with st
andby assistance.

Did oral motor exercises with speech therapy.

After having increased urinary frequency, UA shows nitrite +ve, estrase +ve, >50 bacteria, > 50 WBC. 
Cultures showed 4+ gram negative rods and mixed abdiel.



VITAL SIGNS



Temperature: 97.8 F

SBP/DBP: 129/59

Pulse: 63

Resp: 16



MEDICATION ALLERGIES:



No Known Drug Allergies (NKDA)



ENVIRONMENTAL ALLERGIES:



- Substance Allergies

None Known

- Other Allergies

None Known



NURSING:



- Shower

allowing shower

- Bladder

care per protocol

- Skin

care per protocol



PRECAUTIONS:



- Weight Bearing Precaution

WBAT left LE

- Fall Precaution

SAFTY AND FALL



ACTIVITIES



OOB only with supervision



THERAPIES:



- Dietary and Nutrition

Adequate Nutrition. Nutritional Education. Nutritional Supplements. Evaluate and Treat. 



- Occupational Therapy

Cognitive Retraining. Patient needs Occupational Therapy for a daily minimum of 1.5 hours at least 5 
out of 7 days, to improve Activities of Daily Living, including: Eating, Grooming, Bathing, Dressing,
 Toileting, Toilet Transfers, Community Reintegration, Higher functional activities, Adaptive Equipme
nt, Splinting, Household Tasks, and Other activities as determined. Visual Perceptual Training. Evalu
ate and Treat. Transfer Training. Safety Awareness. Patient/Family Education. ADL Training. Household
 Tasks. Eating. 



- Speech Therapy

Cognitive Training. Expressive Language Skills. Memory Strategies. Patient needs Speech Therapy for a
 daily minimum of 1.5 hours at least 5 out of 7 days, to improve: Swallowing, Cognition, Language Ski
lls, and Compensatory Strategies. Receptive Language Skills. Speech Intelligibility Training. Evaluat
e and Treat. 



- Physical Therapy

Patient needs Physical Therapy for a daily minimum of 1.5 hours at least 5 out of 7 days, to improve:
 Mobility, Strengthening, Transfers, Stretching, ROM, Endurance, Ability to manage stairs, Gait, and 
Balance. Mobility Training. Safety Awareness. Gait Training. Balance Training. Transfer Training. Apoorva
luate and Treat. Patient/Family Education. LE Strengthening. 



PHYSICAL EXAM



- Gen

Alert and awake

Lying in bed

No apparent distress

Oriented to: person, time, and place

- Skin

No breakdown



Mild left facial numbness and weakness.

- Eyes

No abnormalities

- ENMT

No abnormalities

- Neck

No abnormalities

- CVS

RRR

- Chest

No abnormalities

- Abd

Soft

- GI

Soft



Deferred

- 

No abnormalities

- Ext

No significant edema

- MSK

3+ to 4+/5 weakness in left upper and lower extremity

- Neuro

3+ to 4+/5 weakness in left upper and lower extremity

- Psych

No abnormalities



ASSESSMENT:



Pt. is a 70 yo Right-handed female.On 04/03/2022 Pt. presented to [facility] with sudden
 onset of left-side weakness.On 04/03/2022 she was admitted to [facility] with diagnosis
 STROKE.Her impairment category is Stroke 01 -  Left Body (Right Brain) (01.1).Pre-morbidly, Pt. was 
independent/mod-I in Locomotion, Safety Awareness, Social Cognition, and Balance; and she had good Sp
hincter Control, Self-Care, Communication, and Endurance.Currently, she has deficits of Locomotion, S
afety Awareness, Balance, Social Cognition, Transfers Control, Self-Care, Sphincter Control, Communic
ation, and Endurance.Pt. is now referred to [facility] for acute in-patient re
habilitation in order to maximize patient's functional independence in activities of daily living, st
rength, ROM, and mobility.- Rehab Goal

Patient has realistic goal of being discharged at assistance level 7-Ind to reside at Home with  Fami
ly/Relatives.

MDM/PLAN:



- Physical Therapy

 Gait dysfunction - to improve, our physical therapists will perform initial evaluation of pt's statu
s upon admission and devise an individualized program for Gait Training, and Wheel Chair mobility

 Inability to transfer - to improve, our physical therapists will perform initial evaluation of pt's 
status upon admission and devise an individualized program for Bed mobility

 Need for home safety evaluation - to improve, our physical therapists will perform initial evaluatio
n of pt's status upon admission and devise an individualized program for Home Evaluation

 Need in caregiver upon discharge - to improve, our physical therapists will perform initial evaluati
on of pt's status upon admission and devise an individualized program for Caregiver Training

 New precaution - to improve, our physical therapists will perform initial evaluation of pt's status 
upon admission and devise an individualized program for Patient precaution education

 Edema - to improve, our physical therapists will perform initial evaluation of pt's status upon admi
ssion and devise an individualized program for Elevation Training, and Lymphedema Therapy

 Poor balance - to improve, our physical therapists will perform initial evaluation of pt's status up
on admission and devise an individualized program for Balance Training

 Poor endurance - to improve, our physical therapists will perform initial evaluation of pt's status 
upon admission and devise an individualized program for Endurance Training

 Weakness - to improve, our physical therapists will perform initial evaluation of pt's status upon a
dmission and devise an individualized program for Aquatic Therapy, Neuromuscular Reeducation, and Str
engthening

 Achieving independence - to improve, our physical therapists will perform initial evaluation of pt's
 status upon admission and devise an individualized program for Community Reintegration Activities

- Occupational Therapy

 ADL deficits - to improve, our occupation therapists will perform initial evaluation of pt's status 
upon admission and devise an individualized program for Bathing, Bed mobility, Community Reintegratio
n, Cooking, Dressing, Eating, Fine Motor Skills, Grooming, Homemaking, Kitchen Mobility, Laundry, Pat
ient Education, Safety Awareness, Splinting - Positioning, Transfers(Toilet, Tub, Shower), and Wheel 
Chair Management

 Cognitive deficits - to improve, our occupation therapists will perform initial evaluation of pt's s
tatus upon admission and devise an individualized program for Cognition - orientation

 Need for care giver - to improve, our occupation therapists will perform initial evaluation of pt's 
status upon admission and devise an individualized program for Caregiver Training

 Weakness - to improve, our occupation therapists will perform initial evaluation of pt's status upon
 admission and devise an individualized program for Aquatic Therapy, Balance, Endurance, UE ROM, and 
UE strengthening

- Other

 See attached MAR (Medication Administration Record)

- Diet Type

Continue Regular

- Diet - Liquid Texture

Continue Regular

- Tube Feed

Continue N/A

- Bladder

 care per protocol

- Weight Bearing Precaution

 WBAT left LE

- Fall Precaution

 Bed alarm

 SAFTY AND FALL

 TABS alarm

 Wheel chair alarm

- Skin

 care per protocol

- Diet - Solid Texture

Continue Regular

- Shower

 allowing shower



 for Dementia, TBI, Stroke, or others



FUNCTIONAL STATUS: UPDATED AT WEEKLY TEAM CONFERENCE



- Bladder

Same accident frequency: 7-Ind - No accidents in the past 7 days

- Bowel

Same accident frequency: 7-Ind - No accidents in the past 7 days

- Walking

Same score based on distance walked: 0(N/A)

Same score based on distance walked: 3(>=150ft)

- Wheelchair

Same score based on distance traveled: 0(N/A)



FUNCTIONAL STATUS:



- Self-Care

A. Eating    sup   

B. Grooming    sup   

C. Bathing    Kaylee   

D. Dressing - Upper     Kaylee   

E. Dressing - Lower     modA   

F. Toileting    Kaylee   

- Sphincter Control

G. Bladder control     Freddy   

H. Bowel control     Freddy   

- Transfers Control

I. Bed/Chair/Wheelchair     Kaylee   

J. Toilet    Kaylee   

K. Tub/Shower    modA   

- Locomotion

L. Walk/Wheelchair (B)     Kaylee   

M. Stairs    Kaylee   

- Communication

N. Comprehension (B)     Kaylee   

O. Expression (B)     Kaylee   

- Social Cognition

P. Social Interaction    Freddy   

Q. Problem Solving    sup   

R. Memory    sup   

- Endurance

Fair

- Balance

Fair

- Safety Awareness

Fair



QI SCORES:



- Self-Care

A. Eating  03-Partial/moderate assistance  

B. Oral hygiene  03-Partial/moderate assistance  

C. Toileting hygiene  03-Partial/moderate assistance  

E. Shower/bathe self  02-Substantial/maximal assistance  

F. Upper body dressing  02-Substantial/maximal assistance  

G. Lower body dressing  02-Substantial/maximal assistance  

H. Putting on/taking off footwear  88-Not attempted due to medical condition or safety concerns  

- Mobility

A. Roll left and right  03-Partial/moderate assistance  

B. Sit to lying  03-Partial/moderate assistance  

C. Lying to sitting on side of bed  03-Partial/moderate assistance  

D. Sit to stand  03-Partial/moderate assistance  

E. Chair/bed-to-chair transfer  03-Partial/moderate assistance  

F. Toilet transfer  03-Partial/moderate assistance  

G. Car transfer  88-Not attempted due to medical condition or safety concerns  

I. Walk 10 feet  03-Partial/moderate assistance  

J. Walk 50 feet with two turns  03-Partial/moderate assistance  

K. Walk 150 feet  03-Partial/moderate assistance  

L. Walking 10 feet on uneven surfaces  88-Not attempted due to medical condition or safety concerns  


M. 1 step (curb)  88-Not attempted due to medical condition or safety concerns  

N. 4 steps  88-Not attempted due to medical condition or safety concerns  

O. 12 steps  88-Not attempted due to medical condition or safety concerns  

P. Picking up object  88-Not attempted due to medical condition or safety concerns  

R. Wheel 50 feet with two turns  88-Not attempted due to medical condition or safety concerns  

S. Wheel 150 feet  88-Not attempted due to medical condition or safety concerns  

- Bladder and Bowel

Bladder continence      

Bowel continence      

- Endurance

Fair

- Balance

Fair

- Safety Awareness

Fair



CURRENT [facility]. DEFICITS:



Self-Care, Mobility, Endurance, Balance, and Safety Awareness



SIGNATURE PANEL:



Electronically signed by Dr. Fabio Shaffer M.D. on 04/25/2022 at 22:11 (CDT)

## 2022-04-26 RX ADMIN — TRAMADOL HYDROCHLORIDE PRN MG: 50 TABLET, COATED ORAL at 15:00

## 2022-04-26 RX ADMIN — HUMAN INSULIN SCH: 100 INJECTION, SOLUTION SUBCUTANEOUS at 07:30

## 2022-04-26 RX ADMIN — LOSARTAN POTASSIUM SCH MG: 50 TABLET, FILM COATED ORAL at 08:21

## 2022-04-26 RX ADMIN — ATORVASTATIN CALCIUM SCH MG: 40 TABLET, FILM COATED ORAL at 19:14

## 2022-04-26 RX ADMIN — APIXABAN SCH MG: 2.5 TABLET, FILM COATED ORAL at 08:22

## 2022-04-26 RX ADMIN — LEVOTHYROXINE SODIUM SCH MG: 0.05 TABLET ORAL at 05:18

## 2022-04-26 RX ADMIN — LATANOPROST SCH DROP: 50 SOLUTION OPHTHALMIC at 19:14

## 2022-04-26 RX ADMIN — CIPROFLOXACIN SCH MG: 500 TABLET, FILM COATED ORAL at 08:22

## 2022-04-26 RX ADMIN — GABAPENTIN SCH MG: 300 CAPSULE ORAL at 08:20

## 2022-04-26 RX ADMIN — PIOGLITAZONE SCH MG: 15 TABLET ORAL at 08:21

## 2022-04-26 RX ADMIN — APIXABAN SCH MG: 2.5 TABLET, FILM COATED ORAL at 19:14

## 2022-04-26 RX ADMIN — HUMAN INSULIN SCH: 100 INJECTION, SOLUTION SUBCUTANEOUS at 19:15

## 2022-04-26 RX ADMIN — INSULIN GLARGINE SCH: 100 INJECTION, SOLUTION SUBCUTANEOUS at 08:00

## 2022-04-26 RX ADMIN — TRAMADOL HYDROCHLORIDE PRN MG: 50 TABLET, COATED ORAL at 08:23

## 2022-04-26 RX ADMIN — ALOGLIPTIN SCH MG: 12.5 TABLET, FILM COATED ORAL at 08:25

## 2022-04-26 RX ADMIN — GABAPENTIN SCH MG: 300 CAPSULE ORAL at 19:14

## 2022-04-26 RX ADMIN — ASPIRIN SCH MG: 81 TABLET, COATED ORAL at 08:21

## 2022-04-26 RX ADMIN — DULOXETINE SCH MG: 20 CAPSULE, DELAYED RELEASE ORAL at 08:22

## 2022-04-26 RX ADMIN — DILTIAZEM HYDROCHLORIDE SCH MG: 120 CAPSULE, COATED, EXTENDED RELEASE ORAL at 08:21

## 2022-04-26 RX ADMIN — Medication SCH MG: at 08:22

## 2022-04-26 RX ADMIN — LOSARTAN POTASSIUM SCH MG: 50 TABLET, FILM COATED ORAL at 19:15

## 2022-04-26 RX ADMIN — HUMAN INSULIN SCH: 100 INJECTION, SOLUTION SUBCUTANEOUS at 16:30

## 2022-04-26 RX ADMIN — GABAPENTIN SCH MG: 300 CAPSULE ORAL at 14:59

## 2022-04-26 RX ADMIN — GLIMEPIRIDE SCH MG: 2 TABLET ORAL at 08:22

## 2022-04-26 RX ADMIN — Medication SCH MG: at 19:14

## 2022-04-26 RX ADMIN — Medication SCH: at 08:23

## 2022-04-26 RX ADMIN — HUMAN INSULIN SCH: 100 INJECTION, SOLUTION SUBCUTANEOUS at 11:30

## 2022-04-26 NOTE — R.PN
PROGRESS NOTES



ENCOUNTER DATE AND TIME:



04/26/2022 19:51 (CDT)



NAME



JOSE ORO



YOB: 1950



DATE OF ADMISSION:



04/10/2022 15:34 (CDT)



STROKECHIEF COMPLAINT:



Stroke with left face, arm and leg weakness



SUBJECTIVE:



Pt denied any depression.

Pt denied any Shortness of Breath.

CBC with differential is normal except Hgb is mildly low at 11.8. Glucose 71 to 193, prealbumin 14.8,
 UA is negative.

Ambulated 350' with standby assistance using a rolling walker. Self-propelled wheelchair 250' with st
andby assistance.

Did oral motor exercises with speech therapy.

After having increased urinary frequency, UA shows nitrite +ve, estrase +ve, >50 bacteria, > 50 WBC. 
Cultures showed 4+ gram negative rods and mixed abdiel. Citrobacter Freundii Complex was cultured and 
sensitive to Cipro. She completed Cipro 500 mg twice daily for 5 days.

Eliquis 2.5 mg bid for DVT prophylaxis.



VITAL SIGNS



Temperature: 98.9 F

SBP/DBP: 137/63

Pulse: 72

Resp: 16



MEDICATION ALLERGIES:



No Known Drug Allergies (NKDA)



ENVIRONMENTAL ALLERGIES:



- Substance Allergies

None Known

- Other Allergies

None Known



NURSING:



- Shower

allowing shower

- Bladder

care per protocol

- Skin

care per protocol



PRECAUTIONS:



- Weight Bearing Precaution

WBAT left LE

- Fall Precaution

SAFTY AND FALL



ACTIVITIES



OOB only with supervision



THERAPIES:



- Dietary and Nutrition

Adequate Nutrition. Nutritional Education. Nutritional Supplements. Evaluate and Treat. 



- Occupational Therapy

Cognitive Retraining. Patient needs Occupational Therapy for a daily minimum of 1.5 hours at least 5 
out of 7 days, to improve Activities of Daily Living, including: Eating, Grooming, Bathing, Dressing,
 Toileting, Toilet Transfers, Community Reintegration, Higher functional activities, Adaptive Equipme
nt, Splinting, Household Tasks, and Other activities as determined. Visual Perceptual Training. Evalu
ate and Treat. Transfer Training. Safety Awareness. Patient/Family Education. ADL Training. Household
 Tasks. Eating. 



- Speech Therapy

Cognitive Training. Expressive Language Skills. Memory Strategies. Patient needs Speech Therapy for a
 daily minimum of 1.5 hours at least 5 out of 7 days, to improve: Swallowing, Cognition, Language Ski
lls, and Compensatory Strategies. Receptive Language Skills. Speech Intelligibility Training. Evaluat
e and Treat. 



- Physical Therapy

Patient needs Physical Therapy for a daily minimum of 1.5 hours at least 5 out of 7 days, to improve:
 Mobility, Strengthening, Transfers, Stretching, ROM, Endurance, Ability to manage stairs, Gait, and 
Balance. Mobility Training. Safety Awareness. Gait Training. Balance Training. Transfer Training. Apoorva
luate and Treat. Patient/Family Education. LE Strengthening. 



PHYSICAL EXAM



- Gen

Alert and awake

Lying in bed

No apparent distress

Oriented to: person, time, and place

- Skin

No breakdown



Mild left facial numbness and weakness.

- Eyes

No abnormalities

- ENMT

No abnormalities

- Neck

No abnormalities

- CVS

RRR

- Chest

No abnormalities

- Abd

Soft

- GI

Soft



Deferred

- 

No abnormalities

- Ext

No significant edema

- MSK

3+ to 4+/5 weakness in left upper and lower extremity

- Neuro

3+ to 4+/5 weakness in left upper and lower extremity

- Psych

No abnormalities



ASSESSMENT:



Pt. is a 72 yo Right-handed female.On 04/03/2022 Pt. presented to [facility] with sudden
 onset of left-side weakness.On 04/03/2022 she was admitted to [facility] with diagnosis
 STROKE.Her impairment category is Stroke 01 -  Left Body (Right Brain) (01.1).Pre-morbidly, Pt. was 
independent/mod-I in Locomotion, Safety Awareness, Social Cognition, and Balance; and she had good Sp
hincter Control, Self-Care, Communication, and Endurance.Currently, she has deficits of Locomotion, S
afety Awareness, Balance, Social Cognition, Transfers Control, Self-Care, Sphincter Control, Communic
ation, and Endurance.Pt. is now referred to [facility] for acute in-patient re
habilitation in order to maximize patient's functional independence in activities of daily living, st
rength, ROM, and mobility.- Rehab Goal

Patient has realistic goal of being discharged at assistance level 7-Ind to reside at Home with  Fami
ly/Relatives.

MDM/PLAN:



- Physical Therapy

 Gait dysfunction - to improve, our physical therapists will perform initial evaluation of pt's statu
s upon admission and devise an individualized program for Gait Training, and Wheel Chair mobility

 Inability to transfer - to improve, our physical therapists will perform initial evaluation of pt's 
status upon admission and devise an individualized program for Bed mobility

 Need for home safety evaluation - to improve, our physical therapists will perform initial evaluatio
n of pt's status upon admission and devise an individualized program for Home Evaluation

 Need in caregiver upon discharge - to improve, our physical therapists will perform initial evaluati
on of pt's status upon admission and devise an individualized program for Caregiver Training

 New precaution - to improve, our physical therapists will perform initial evaluation of pt's status 
upon admission and devise an individualized program for Patient precaution education

 Edema - to improve, our physical therapists will perform initial evaluation of pt's status upon admi
ssion and devise an individualized program for Elevation Training, and Lymphedema Therapy

 Poor balance - to improve, our physical therapists will perform initial evaluation of pt's status up
on admission and devise an individualized program for Balance Training

 Poor endurance - to improve, our physical therapists will perform initial evaluation of pt's status 
upon admission and devise an individualized program for Endurance Training

 Weakness - to improve, our physical therapists will perform initial evaluation of pt's status upon a
dmission and devise an individualized program for Aquatic Therapy, Neuromuscular Reeducation, and Str
engthening

 Achieving independence - to improve, our physical therapists will perform initial evaluation of pt's
 status upon admission and devise an individualized program for Community Reintegration Activities

- Occupational Therapy

 ADL deficits - to improve, our occupation therapists will perform initial evaluation of pt's status 
upon admission and devise an individualized program for Bathing, Bed mobility, Community Reintegratio
n, Cooking, Dressing, Eating, Fine Motor Skills, Grooming, Homemaking, Kitchen Mobility, Laundry, Pat
ient Education, Safety Awareness, Splinting - Positioning, Transfers(Toilet, Tub, Shower), and Wheel 
Chair Management

 Cognitive deficits - to improve, our occupation therapists will perform initial evaluation of pt's s
tatus upon admission and devise an individualized program for Cognition - orientation

 Need for care giver - to improve, our occupation therapists will perform initial evaluation of pt's 
status upon admission and devise an individualized program for Caregiver Training

 Weakness - to improve, our occupation therapists will perform initial evaluation of pt's status upon
 admission and devise an individualized program for Aquatic Therapy, Balance, Endurance, UE ROM, and 
UE strengthening

- Other

 See attached MAR (Medication Administration Record)

- Diet Type

Continue Regular

- Diet - Liquid Texture

Continue Regular

- Tube Feed

Continue N/A

- Bladder

 care per protocol

- Weight Bearing Precaution

 WBAT left LE

- Fall Precaution

 Bed alarm

 SAFTY AND FALL

 TABS alarm

 Wheel chair alarm

- Skin

 care per protocol

- Diet - Solid Texture

Continue Regular

- Shower

 allowing shower



 for Dementia, TBI, Stroke, or others



FUNCTIONAL STATUS: UPDATED AT WEEKLY TEAM CONFERENCE



- Bladder

Same accident frequency: 7-Ind - No accidents in the past 7 days

- Bowel

Same accident frequency: 7-Ind - No accidents in the past 7 days

- Walking

Same score based on distance walked: 0(N/A)

Same score based on distance walked: 3(>=150ft)

- Wheelchair

Same score based on distance traveled: 0(N/A)



FUNCTIONAL STATUS:



- Self-Care

A. Eating    sup   

B. Grooming    sup   

C. Bathing    Kaylee   

D. Dressing - Upper     Kaylee   

E. Dressing - Lower     modA   

F. Toileting    Kaylee   

- Sphincter Control

G. Bladder control     Freddy   

H. Bowel control     Freddy   

- Transfers Control

I. Bed/Chair/Wheelchair     Kaylee   

J. Toilet    Kaylee   

K. Tub/Shower    modA   

- Locomotion

L. Walk/Wheelchair (B)     Kaylee   

M. Stairs    Kaylee   

- Communication

N. Comprehension (B)     Kaylee   

O. Expression (B)     Kaylee   

- Social Cognition

P. Social Interaction    Freddy   

Q. Problem Solving    sup   

R. Memory    sup   

- Endurance

Fair

- Balance

Fair

- Safety Awareness

Fair



QI SCORES:



- Self-Care

A. Eating  03-Partial/moderate assistance  

B. Oral hygiene  03-Partial/moderate assistance  

C. Toileting hygiene  03-Partial/moderate assistance  

E. Shower/bathe self  02-Substantial/maximal assistance  

F. Upper body dressing  02-Substantial/maximal assistance  

G. Lower body dressing  02-Substantial/maximal assistance  

H. Putting on/taking off footwear  88-Not attempted due to medical condition or safety concerns  

- Mobility

A. Roll left and right  03-Partial/moderate assistance  

B. Sit to lying  03-Partial/moderate assistance  

C. Lying to sitting on side of bed  03-Partial/moderate assistance  

D. Sit to stand  03-Partial/moderate assistance  

E. Chair/bed-to-chair transfer  03-Partial/moderate assistance  

F. Toilet transfer  03-Partial/moderate assistance  

G. Car transfer  88-Not attempted due to medical condition or safety concerns  

I. Walk 10 feet  03-Partial/moderate assistance  

J. Walk 50 feet with two turns  03-Partial/moderate assistance  

K. Walk 150 feet  03-Partial/moderate assistance  

L. Walking 10 feet on uneven surfaces  88-Not attempted due to medical condition or safety concerns  


M. 1 step (curb)  88-Not attempted due to medical condition or safety concerns  

N. 4 steps  88-Not attempted due to medical condition or safety concerns  

O. 12 steps  88-Not attempted due to medical condition or safety concerns  

P. Picking up object  88-Not attempted due to medical condition or safety concerns  

R. Wheel 50 feet with two turns  88-Not attempted due to medical condition or safety concerns  

S. Wheel 150 feet  88-Not attempted due to medical condition or safety concerns  

- Bladder and Bowel

Bladder continence      

Bowel continence      

- Endurance

Fair

- Balance

Fair

- Safety Awareness

Fair



CURRENT [facility]. DEFICITS:



Self-Care, Mobility, Endurance, Balance, and Safety Awareness



SIGNATURE PANEL:



Electronically signed by Dr. Fabio Shaffer M.D. on 04/26/2022 at 19:56 (CDT)

## 2022-04-27 VITALS — DIASTOLIC BLOOD PRESSURE: 60 MMHG | TEMPERATURE: 97.1 F | SYSTOLIC BLOOD PRESSURE: 137 MMHG

## 2022-04-27 RX ADMIN — ASPIRIN SCH MG: 81 TABLET, COATED ORAL at 08:18

## 2022-04-27 RX ADMIN — GABAPENTIN SCH MG: 300 CAPSULE ORAL at 13:36

## 2022-04-27 RX ADMIN — APIXABAN SCH MG: 2.5 TABLET, FILM COATED ORAL at 08:19

## 2022-04-27 RX ADMIN — INSULIN GLARGINE SCH: 100 INJECTION, SOLUTION SUBCUTANEOUS at 07:19

## 2022-04-27 RX ADMIN — PIOGLITAZONE SCH MG: 15 TABLET ORAL at 08:19

## 2022-04-27 RX ADMIN — DULOXETINE SCH MG: 20 CAPSULE, DELAYED RELEASE ORAL at 08:19

## 2022-04-27 RX ADMIN — GLIMEPIRIDE SCH MG: 2 TABLET ORAL at 08:18

## 2022-04-27 RX ADMIN — LEVOTHYROXINE SODIUM SCH MG: 0.05 TABLET ORAL at 05:36

## 2022-04-27 RX ADMIN — DILTIAZEM HYDROCHLORIDE SCH MG: 120 CAPSULE, COATED, EXTENDED RELEASE ORAL at 08:18

## 2022-04-27 RX ADMIN — HUMAN INSULIN SCH: 100 INJECTION, SOLUTION SUBCUTANEOUS at 07:17

## 2022-04-27 RX ADMIN — GABAPENTIN SCH MG: 300 CAPSULE ORAL at 08:19

## 2022-04-27 RX ADMIN — TRAMADOL HYDROCHLORIDE PRN MG: 50 TABLET, COATED ORAL at 13:36

## 2022-04-27 RX ADMIN — Medication SCH: at 07:18

## 2022-04-27 RX ADMIN — TRAMADOL HYDROCHLORIDE PRN MG: 50 TABLET, COATED ORAL at 08:18

## 2022-04-27 RX ADMIN — HUMAN INSULIN SCH UNIT: 100 INJECTION, SOLUTION SUBCUTANEOUS at 12:22

## 2022-04-27 RX ADMIN — Medication SCH MG: at 08:18

## 2022-04-27 RX ADMIN — LOSARTAN POTASSIUM SCH MG: 50 TABLET, FILM COATED ORAL at 08:18

## 2022-04-27 RX ADMIN — ALOGLIPTIN SCH MG: 12.5 TABLET, FILM COATED ORAL at 09:17

## 2022-04-27 NOTE — R.PN
PROGRESS NOTES



ENCOUNTER DATE AND TIME:



04/27/2022 19:52 (CDT)



NAME



JOSE ORO



YOB: 1950



DATE OF ADMISSION:



04/10/2022 15:34 (CDT)



STROKECHIEF COMPLAINT:



Stroke with left face, arm and leg weakness



SUBJECTIVE:



Pt denied any depression.

Pt denied any Shortness of Breath.

CBC with differential is normal except Hgb is mildly low at 11.8. Glucose 141 to 206 prealbumin 14.8,
 UA is negative.

Ambulated 250' with standby assistance using a rolling walker. Self-propelled wheelchair 150' with st
andby assistance.

Did oral motor exercises with speech therapy.

Aspirin 81 mg daily and Plavix 75 mg daily.

To be discharged home today.



VITAL SIGNS



Temperature: 97.1 F

SBP/DBP: 137/60

Pulse: 69

Resp: 16



MEDICATION ALLERGIES:



No Known Drug Allergies (NKDA)



ENVIRONMENTAL ALLERGIES:



- Substance Allergies

None Known

- Other Allergies

None Known



NURSING:



- Shower

allowing shower

- Bladder

care per protocol

- Skin

care per protocol



PRECAUTIONS:



- Weight Bearing Precaution

WBAT left LE

- Fall Precaution

SAFTY AND FALL



ACTIVITIES



OOB only with supervision



THERAPIES:



- Dietary and Nutrition

Adequate Nutrition. Nutritional Education. Nutritional Supplements. Evaluate and Treat. 



- Occupational Therapy

Cognitive Retraining. Patient needs Occupational Therapy for a daily minimum of 1.5 hours at least 5 
out of 7 days, to improve Activities of Daily Living, including: Eating, Grooming, Bathing, Dressing,
 Toileting, Toilet Transfers, Community Reintegration, Higher functional activities, Adaptive Equipme
nt, Splinting, Household Tasks, and Other activities as determined. Visual Perceptual Training. Evalu
ate and Treat. Transfer Training. Safety Awareness. Patient/Family Education. ADL Training. Household
 Tasks. Eating. 



- Speech Therapy

Cognitive Training. Expressive Language Skills. Memory Strategies. Patient needs Speech Therapy for a
 daily minimum of 1.5 hours at least 5 out of 7 days, to improve: Swallowing, Cognition, Language Ski
lls, and Compensatory Strategies. Receptive Language Skills. Speech Intelligibility Training. Evaluat
e and Treat. 



- Physical Therapy

Patient needs Physical Therapy for a daily minimum of 1.5 hours at least 5 out of 7 days, to improve:
 Mobility, Strengthening, Transfers, Stretching, ROM, Endurance, Ability to manage stairs, Gait, and 
Balance. Mobility Training. Safety Awareness. Gait Training. Balance Training. Transfer Training. Apoorva
luate and Treat. Patient/Family Education. LE Strengthening. 



PHYSICAL EXAM



- Gen

Alert and awake

Lying in bed

No apparent distress

Oriented to: person, time, and place

- Skin

No breakdown



Mild left facial numbness and weakness.

- Eyes

No abnormalities

- ENMT

No abnormalities

- Neck

No abnormalities

- CVS

RRR

- Chest

No abnormalities

- Abd

Soft

- GI

Soft



Deferred

- 

No abnormalities

- Ext

No significant edema

- MSK

3+ to 4+/5 weakness in left upper and lower extremity

- Neuro

3+ to 4+/5 weakness in left upper and lower extremity

- Psych

No abnormalities



ASSESSMENT:



Pt. is a 72 yo Right-handed female.On 04/03/2022 Pt. presented to [facility] with sudden
 onset of left-side weakness.On 04/03/2022 she was admitted to [facility] with diagnosis
 STROKE.Her impairment category is Stroke 01 -  Left Body (Right Brain) (01.1).Pre-morbidly, Pt. was 
independent/mod-I in Locomotion, Safety Awareness, Social Cognition, and Balance; and she had good Sp
hincter Control, Self-Care, Communication, and Endurance.Currently, she has deficits of Locomotion, S
afety Awareness, Balance, Social Cognition, Transfers Control, Self-Care, Sphincter Control, Communic
ation, and Endurance.Pt. is now referred to [facility] for acute in-patient re
habilitation in order to maximize patient's functional independence in activities of daily living, st
rength, ROM, and mobility.- Rehab Goal

Patient has realistic goal of being discharged at assistance level 7-Ind to reside at Home with  Fami
ly/Relatives.

MDM/PLAN:



- Physical Therapy

 Gait dysfunction - to improve, our physical therapists will perform initial evaluation of pt's statu
s upon admission and devise an individualized program for Gait Training, and Wheel Chair mobility

 Inability to transfer - to improve, our physical therapists will perform initial evaluation of pt's 
status upon admission and devise an individualized program for Bed mobility

 Need for home safety evaluation - to improve, our physical therapists will perform initial evaluatio
n of pt's status upon admission and devise an individualized program for Home Evaluation

 Need in caregiver upon discharge - to improve, our physical therapists will perform initial evaluati
on of pt's status upon admission and devise an individualized program for Caregiver Training

 New precaution - to improve, our physical therapists will perform initial evaluation of pt's status 
upon admission and devise an individualized program for Patient precaution education

 Edema - to improve, our physical therapists will perform initial evaluation of pt's status upon admi
ssion and devise an individualized program for Elevation Training, and Lymphedema Therapy

 Poor balance - to improve, our physical therapists will perform initial evaluation of pt's status up
on admission and devise an individualized program for Balance Training

 Poor endurance - to improve, our physical therapists will perform initial evaluation of pt's status 
upon admission and devise an individualized program for Endurance Training

 Weakness - to improve, our physical therapists will perform initial evaluation of pt's status upon a
dmission and devise an individualized program for Aquatic Therapy, Neuromuscular Reeducation, and Str
engthening

 Achieving independence - to improve, our physical therapists will perform initial evaluation of pt's
 status upon admission and devise an individualized program for Community Reintegration Activities

- Occupational Therapy

 ADL deficits - to improve, our occupation therapists will perform initial evaluation of pt's status 
upon admission and devise an individualized program for Bathing, Bed mobility, Community Reintegratio
n, Cooking, Dressing, Eating, Fine Motor Skills, Grooming, Homemaking, Kitchen Mobility, Laundry, Pat
ient Education, Safety Awareness, Splinting - Positioning, Transfers(Toilet, Tub, Shower), and Wheel 
Chair Management

 Cognitive deficits - to improve, our occupation therapists will perform initial evaluation of pt's s
tatus upon admission and devise an individualized program for Cognition - orientation

 Need for care giver - to improve, our occupation therapists will perform initial evaluation of pt's 
status upon admission and devise an individualized program for Caregiver Training

 Weakness - to improve, our occupation therapists will perform initial evaluation of pt's status upon
 admission and devise an individualized program for Aquatic Therapy, Balance, Endurance, UE ROM, and 
UE strengthening

- Other

 See attached MAR (Medication Administration Record)

- Diet Type

Continue Regular

- Diet - Liquid Texture

Continue Regular

- Tube Feed

Continue N/A

- Bladder

 care per protocol

- Weight Bearing Precaution

 WBAT left LE

- Fall Precaution

 Bed alarm

 SAFTY AND FALL

 TABS alarm

 Wheel chair alarm

- Skin

 care per protocol

- Diet - Solid Texture

Continue Regular

- Shower

 allowing shower



 for Dementia, TBI, Stroke, or others



FUNCTIONAL STATUS: UPDATED AT WEEKLY TEAM CONFERENCE



- Bladder

Same accident frequency: 7-Ind - No accidents in the past 7 days

- Bowel

Same accident frequency: 7-Ind - No accidents in the past 7 days

- Walking

Same score based on distance walked: 0(N/A)

Same score based on distance walked: 3(>=150ft)

- Wheelchair

Same score based on distance traveled: 0(N/A)



FUNCTIONAL STATUS:



- Self-Care

A. Eating    sup   

B. Grooming    sup   

C. Bathing    Kaylee   

D. Dressing - Upper     Kaylee   

E. Dressing - Lower     modA   

F. Toileting    Kaylee   

- Sphincter Control

G. Bladder control     Freddy   

H. Bowel control     Freddy   

- Transfers Control

I. Bed/Chair/Wheelchair     Kaylee   

J. Toilet    Kaylee   

K. Tub/Shower    modA   

- Locomotion

L. Walk/Wheelchair (B)     Kaylee   

M. Stairs    Kaylee   

- Communication

N. Comprehension (B)     Kaylee   

O. Expression (B)     Kaylee   

- Social Cognition

P. Social Interaction    Freddy   

Q. Problem Solving    sup   

R. Memory    sup   

- Endurance

Fair

- Balance

Fair

- Safety Awareness

Fair



QI SCORES:



- Self-Care

A. Eating  03-Partial/moderate assistance  

B. Oral hygiene  03-Partial/moderate assistance  

C. Toileting hygiene  03-Partial/moderate assistance  

E. Shower/bathe self  02-Substantial/maximal assistance  

F. Upper body dressing  02-Substantial/maximal assistance  

G. Lower body dressing  02-Substantial/maximal assistance  

H. Putting on/taking off footwear  88-Not attempted due to medical condition or safety concerns  

- Mobility

A. Roll left and right  03-Partial/moderate assistance  

B. Sit to lying  03-Partial/moderate assistance  

C. Lying to sitting on side of bed  03-Partial/moderate assistance  

D. Sit to stand  03-Partial/moderate assistance  

E. Chair/bed-to-chair transfer  03-Partial/moderate assistance  

F. Toilet transfer  03-Partial/moderate assistance  

G. Car transfer  88-Not attempted due to medical condition or safety concerns  

I. Walk 10 feet  03-Partial/moderate assistance  

J. Walk 50 feet with two turns  03-Partial/moderate assistance  

K. Walk 150 feet  03-Partial/moderate assistance  

L. Walking 10 feet on uneven surfaces  88-Not attempted due to medical condition or safety concerns  


M. 1 step (curb)  88-Not attempted due to medical condition or safety concerns  

N. 4 steps  88-Not attempted due to medical condition or safety concerns  

O. 12 steps  88-Not attempted due to medical condition or safety concerns  

P. Picking up object  88-Not attempted due to medical condition or safety concerns  

R. Wheel 50 feet with two turns  88-Not attempted due to medical condition or safety concerns  

S. Wheel 150 feet  88-Not attempted due to medical condition or safety concerns  

- Bladder and Bowel

Bladder continence      

Bowel continence      

- Endurance

Fair

- Balance

Fair

- Safety Awareness

Fair



CURRENT [facility] access hospital. DEFICITS:



Self-Care, Mobility, Endurance, Balance, and Safety Awareness



SIGNATURE PANEL:



Electronically signed by Dr. Fabio Shaffer M.D. on 04/27/2022 at 19:56 (CDT)

## 2022-05-05 NOTE — R.DS
DISCHARGE SUMMARY



FACILITY



[facility]



MR#



[id number]



ACCT#



[id number]



NAME



JOSE ORO



ADDRESS



[address]



ZIP



[id number]



PHONE



[phone]



DATE OF BIRTH



1950



AGE



71



SSN#



[id number]



GENDER



Female



MARITAL STATUS







ENCOUNTER PHYSICIAN



Dr. Fabio Shaffer M.D.



REFERRING DOCTOR







REFERRING FACILITY



[facility]



DISCHARGE DIAGNOSIS:



- Stroke 01 -  Left Body (Right Brain) (01.1)

STROKE. 



DATE OF ADMISSION



04/10/2022 15:34 (CDT)



MEDICATION ALLERGIES:



No Known Drug Allergies (NKDA)



ENVIRONMENTAL ALLERGIES:



- Substance Allergies

None Known

- Other Allergies

None Known



DISCHARGE MEDICATIONS:



Other-  ContinueSee attached MAR (Medication Administration Record).



NURSING:



- Shower

allowing shower

- Bladder

care per protocol

- Skin

care per protocol



PRECAUTIONS:



- Weight Bearing Precaution

WBAT left LE

- Fall Precaution

SAFTY AND FALL



ACTIVITIES







OOB only with supervision



THERAPIES:



- Dietary and Nutrition

Adequate Nutrition

Nutritional Education

Nutritional Supplements

Evaluate and Treat

- Occupational Therapy

Cognitive Retraining

Patient needs Occupational Therapy for a daily minimum of 1.5 hours at least 5 out of 7 days, to impr
ove Activities of Daily Living, including: Eating, Grooming, Bathing, Dressing, Toileting, Toilet Tra
nsfers, Community Reintegration, Higher functional activities, Adaptive Equipment, Splinting, Househo
ld Tasks, and Other activities as determined

Visual Perceptual Training

Evaluate and Treat

Transfer Training

Safety Awareness

Patient/Family Education

ADL Training

Household Tasks

Eating

- Speech Therapy

Cognitive Training

Expressive Language Skills

Memory Strategies

Patient needs Speech Therapy for a daily minimum of 1.5 hours at least 5 out of 7 days, to improve: S
wallowing, Cognition, Language Skills, and Compensatory Strategies

Receptive Language Skills

Speech Intelligibility Training

Evaluate and Treat

- Physical Therapy

Patient needs Physical Therapy for a daily minimum of 1.5 hours at least 5 out of 7 days, to improve:
 Mobility, Strengthening, Transfers, Stretching, ROM, Endurance, Ability to manage stairs, Gait, and 
Balance

Mobility Training

Safety Awareness

Gait Training

Balance Training

Transfer Training

Evaluate and Treat

Patient/Family Education

LE Strengthening



HISTORY OF PRESENT ILLNESS:



Pt. is a 72 yo Right-handed female.On 04/03/2022 Pt. presented to [facility] with sudden
 onset of left-side weakness.On 04/03/2022 she was admitted to [facility] with diagnosis
 STROKE.Her impairment category is Stroke 01 -  Left Body (Right Brain) (01.1).Pre-morbidly, Pt. was 
independent/mod-I in Locomotion, Safety Awareness, Social Cognition, and Balance; and she had good Sp
hincter Control, Self-Care, Communication, and Endurance.Currently, she has deficits of Locomotion, S
afety Awareness, Balance, Social Cognition, Transfers Control, Self-Care, Sphincter Control, Communic
ation, and Endurance.Pt. is now referred to [facility] for acute in-patient re
habilitation in order to maximize patient's functional independence in activities of daily living, st
rength, ROM, and mobility.- Rehab Goal

Patient has realistic goal of being discharged at assistance level 7-Ind to reside at Home with  Fami
ly/Relatives.





DIET - LIQUID TEXTURE:

On 04/05/2022 Pt was upgraded to Regular Diet - Liquid Texture.



DIET - SOLID TEXTURE:

On 04/05/2022 Pt was upgraded to Regular Diet - Solid Texture.



DIET TYPE:

On 04/05/2022 Pt was upgraded to Regular Diet Type.



FALL PRECAUTION:

On 04/05/2022 the following precautions were added for the patient: Fall Precaution - SAFTY AND FALL.


On 04/11/2022 the following precautions were added for the patient: Fall Precaution -  Bed alarm, Fal
l Precaution -  TABS alarm, Fall Precaution -  Wheel chair alarm, and Fall Precaution -  SAFTY AND FA
LL.



On 04/12/2022 the following precautions were removed for the patient: Fall Precaution -  Bed alarm, F
all Precaution -  TABS alarm, Fall Precaution -  Wheel chair alarm, and Fall Precaution -  SAFTY AND 
FALL.

The following precautions were added for the patient: Fall Precaution - Bed alarm, Fall Precaution - 
TABS alarm, and Fall Precaution - Wheel chair alarm.

On 04/13/2022 the following precautions were removed for the patient: Fall Precaution - Bed alarm, Fa
ll Precaution - TABS alarm, and Fall Precaution - Wheel chair alarm.

The following precautions were added for the patient: Fall Precaution -  Bed alarm, Fall Precaution -
  SAFTY AND FALL, Fall Precaution -  TABS alarm, and Fall Precaution -  Wheel chair alarm.

The following precautions were removed for the patient: Fall Precaution - SAFTY AND FALL, Fall Precau
tion -  Bed alarm, Fall Precaution -  SAFTY AND FALL, Fall Precaution -  TABS alarm, and Fall Precaut
ion -  Wheel chair alarm.

On 04/05/2022 the following precautions were added for the patient: Weight Bearing Precaution - WBAT 
left LE.

On 04/11/2022 the following precautions were added for the patient: Weight Bearing Precaution -  WBAT
 left LE.

On 04/12/2022 the following precautions were removed for the patient: Weight Bearing Precaution -  WB
AT left LE.

On 04/13/2022 the following precautions were added for the patient: Weight Bearing Precaution -  WBAT
 left LE.



TUBE FEED:

On 04/05/2022 Pt was changed to N/A Tube Feed.



WEIGHT BEARING PRECAUTION:



DISCHARGE PHYSICAL EXAM



- Gen

Alert and awake

Lying in bed

No apparent distress

Oriented to: person, time, and place

- Skin

No breakdown



Mild left facial numbness and weakness.

- Eyes

No abnormalities

- ENMT

No abnormalities

- Neck

No abnormalities

- CVS

RRR

- Chest

No abnormalities

- Abd

Soft

- GI

Soft



Deferred

- 

No abnormalities

- Ext

No significant edema

- MSK

3+ to 4+/5 weakness in left upper and lower extremity

- Neuro

3+ to 4+/5 weakness in left upper and lower extremity

- Psych

No abnormalities



FUNCTIONAL STATUS:



- Self-Care

A. Eating    6-Freddy  

B. Grooming    5-sup   

C. Bathing    5-sup  

D. Dressing - Upper     5-sup  

E. Dressing - Lower     5-sup  

F. Toileting    5-sup  

- Sphincter Control

G. Bladder control     6-Freddy   

H. Bowel control     6-Freddy   

- Transfers Control

I. Bed/Chair/Wheelchair     5-sup  

J. Toilet    5-sup  

K. Tub/Shower    5-sup  

- Locomotion

L. Walk/Wheelchair (B)     5-sup  

M. Stairs    4-Kaylee   

- Communication

N. Comprehension (B)     5-sup  

O. Expression (B)     5-sup  

- Social Cognition

P. Social Interaction    6-Freddy   

Q. Problem Solving    5-sup   

R. Memory    5-sup   

- Endurance

Good

- Balance

Good

- Safety Awareness

Good



QI SCORES:



- Self-Care

A. Eating  03-Partial/moderate assistance  

B. Oral hygiene  03-Partial/moderate assistance  

C. Toileting hygiene  03-Partial/moderate assistance  

E. Shower/bathe self  02-Substantial/maximal assistance  

F. Upper body dressing  02-Substantial/maximal assistance  

G. Lower body dressing  02-Substantial/maximal assistance  

H. Putting on/taking off footwear  88-Not attempted due to medical condition or safety concerns  

- Mobility

A. Roll left and right  03-Partial/moderate assistance  

B. Sit to lying  03-Partial/moderate assistance  

C. Lying to sitting on side of bed  03-Partial/moderate assistance  

D. Sit to stand  03-Partial/moderate assistance  

E. Chair/bed-to-chair transfer  03-Partial/moderate assistance  

F. Toilet transfer  03-Partial/moderate assistance  

G. Car transfer  88-Not attempted due to medical condition or safety concerns  

I. Walk 10 feet  03-Partial/moderate assistance  

J. Walk 50 feet with two turns  03-Partial/moderate assistance  

K. Walk 150 feet  03-Partial/moderate assistance  

L. Walking 10 feet on uneven surfaces  88-Not attempted due to medical condition or safety concerns  


M. 1 step (curb)  88-Not attempted due to medical condition or safety concerns  

N. 4 steps  88-Not attempted due to medical condition or safety concerns  

O. 12 steps  88-Not attempted due to medical condition or safety concerns  

P. Picking up object  88-Not attempted due to medical condition or safety concerns  

R. Wheel 50 feet with two turns  88-Not attempted due to medical condition or safety concerns  

S. Wheel 150 feet  88-Not attempted due to medical condition or safety concerns  

- Bladder and Bowel

Bladder continence      

Bowel continence      

- Endurance

Fair

- Balance

Fair

- Safety Awareness

Fair



DISCHARGE INSTRUCTIONS:



- N/A

Aspirin 81 mg and Plavix 75 mg daily. 



DISCHARGE PLAN, FOLLOW UP CARE PROVISIONS:



- Estimated Length of Stay (days)

17. 



- Consensus on plan

Discharge plan has been discussed with primary caregiver. Patient/Family is in agreement with the leopoldo
n. Primary caregiver is in agreement with the plan. 



- Patient/Family Goals

Return home independently. 



- Planned Living Setting Upon Discharge

Home, to live with Family/Relatives. Transitional Living. 



SIGNATURE PANEL:



Electronically signed by Dr. Fabio Shaffer M.D. on 05/05/2022 at 12:12 (CDT)

## 2025-04-08 ENCOUNTER — HOSPITAL ENCOUNTER (EMERGENCY)
Dept: HOSPITAL 97 - ER | Age: 75
Discharge: HOME | End: 2025-04-08
Payer: COMMERCIAL

## 2025-04-08 VITALS — OXYGEN SATURATION: 98 % | SYSTOLIC BLOOD PRESSURE: 129 MMHG | DIASTOLIC BLOOD PRESSURE: 45 MMHG

## 2025-04-08 VITALS — TEMPERATURE: 98 F

## 2025-04-08 DIAGNOSIS — Z23: ICD-10-CM

## 2025-04-08 DIAGNOSIS — S01.81XA: ICD-10-CM

## 2025-04-08 DIAGNOSIS — S42.021A: Primary | ICD-10-CM
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
## 2025-04-08 PROCEDURE — 73030 X-RAY EXAM OF SHOULDER: CPT

## 2025-04-08 PROCEDURE — 73130 X-RAY EXAM OF HAND: CPT

## 2025-04-08 PROCEDURE — 72125 CT NECK SPINE W/O DYE: CPT

## 2025-04-08 PROCEDURE — 12051 INTMD RPR FACE/MM 2.5 CM/<: CPT

## 2025-04-08 PROCEDURE — 90715 TDAP VACCINE 7 YRS/> IM: CPT

## 2025-04-08 PROCEDURE — 99284 EMERGENCY DEPT VISIT MOD MDM: CPT

## 2025-04-08 PROCEDURE — 96372 THER/PROPH/DIAG INJ SC/IM: CPT

## 2025-04-08 PROCEDURE — 70450 CT HEAD/BRAIN W/O DYE: CPT

## 2025-04-22 ENCOUNTER — HOSPITAL ENCOUNTER (EMERGENCY)
Dept: HOSPITAL 97 - ER | Age: 75
Discharge: HOME | End: 2025-04-22
Payer: COMMERCIAL

## 2025-04-22 DIAGNOSIS — Z48.02: Primary | ICD-10-CM

## 2025-04-22 PROCEDURE — 99282 EMERGENCY DEPT VISIT SF MDM: CPT

## 2025-05-20 ENCOUNTER — HOSPITAL ENCOUNTER (EMERGENCY)
Dept: HOSPITAL 97 - ER | Age: 75
Discharge: HOME | End: 2025-05-20
Payer: COMMERCIAL

## 2025-05-20 VITALS — TEMPERATURE: 97.9 F | OXYGEN SATURATION: 100 %

## 2025-05-20 VITALS — SYSTOLIC BLOOD PRESSURE: 162 MMHG | DIASTOLIC BLOOD PRESSURE: 77 MMHG
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
## 2025-05-20 DIAGNOSIS — E11.65: ICD-10-CM

## 2025-05-20 DIAGNOSIS — E11.22: ICD-10-CM

## 2025-05-20 DIAGNOSIS — S70.02XA: Primary | ICD-10-CM

## 2025-05-20 DIAGNOSIS — N18.9: ICD-10-CM

## 2025-05-20 LAB
ALBUMIN SERPL BCP-MCNC: 2.9 G/DL (ref 3.4–5)
ALBUMIN/GLOB SERPL: 0.7 {RATIO} (ref 1.1–1.8)
ALP SERPL-CCNC: 172 U/L (ref 45–117)
ALT SERPL W P-5'-P-CCNC: 17 U/L (ref 13–56)
ANION GAP SERPL CALC-SCNC: 10.3 MEQ/L (ref 5–15)
AST SERPL W P-5'-P-CCNC: 16 U/L (ref 15–37)
BILIRUB INDIRECT SERPL-MCNC: 0.2 MG/DL (ref 0.2–0.8)
BUN BLD-MCNC: 32 MG/DL (ref 7–18)
GLOBULIN SER CALC-MCNC: 4.4 G/DL (ref 2.3–3.5)
GLUCOSE SERPLBLD-MCNC: 310 MG/DL (ref 74–106)
HCT VFR BLD CALC: 34.2 % (ref 36–45)
HGB BLD-MCNC: 11.6 G/DL (ref 12–15)
MAGNESIUM SERPL-MCNC: 2.6 MG/DL (ref 1.6–2.4)
MCV RBC: 88.3 FL (ref 80–100)
NRBC BLD AUTO-RTO: 0.2 % (ref 0–0)
NT-PROBNP SERPL-MCNC: 2214 PG/ML (ref ?–125)
PMV BLD: 11.3 FL (ref 7.6–11.3)
POTASSIUM SERPL-SCNC: 4.3 MEQ/L (ref 3.5–5.1)
RBC # BLD: 3.88 M/UL (ref 3.86–4.86)
TROPONIN I SERPL HS-MCNC: 57.7 PG/ML (ref ?–58.9)

## 2025-05-20 PROCEDURE — 36415 COLL VENOUS BLD VENIPUNCTURE: CPT

## 2025-05-20 PROCEDURE — 80048 BASIC METABOLIC PNL TOTAL CA: CPT

## 2025-05-20 PROCEDURE — 85025 COMPLETE CBC W/AUTO DIFF WBC: CPT

## 2025-05-20 PROCEDURE — 83735 ASSAY OF MAGNESIUM: CPT

## 2025-05-20 PROCEDURE — 80076 HEPATIC FUNCTION PANEL: CPT

## 2025-05-20 PROCEDURE — 99284 EMERGENCY DEPT VISIT MOD MDM: CPT

## 2025-05-20 PROCEDURE — 71045 X-RAY EXAM CHEST 1 VIEW: CPT

## 2025-05-20 PROCEDURE — 83880 ASSAY OF NATRIURETIC PEPTIDE: CPT

## 2025-05-20 PROCEDURE — 93005 ELECTROCARDIOGRAM TRACING: CPT

## 2025-05-20 PROCEDURE — 84484 ASSAY OF TROPONIN QUANT: CPT
